# Patient Record
Sex: FEMALE | Race: WHITE | NOT HISPANIC OR LATINO
[De-identification: names, ages, dates, MRNs, and addresses within clinical notes are randomized per-mention and may not be internally consistent; named-entity substitution may affect disease eponyms.]

---

## 2020-01-21 ENCOUNTER — APPOINTMENT (OUTPATIENT)
Dept: ORTHOPEDIC SURGERY | Facility: CLINIC | Age: 75
End: 2020-01-21
Payer: MEDICARE

## 2020-01-21 VITALS
BODY MASS INDEX: 37.57 KG/M2 | DIASTOLIC BLOOD PRESSURE: 65 MMHG | HEIGHT: 58 IN | HEART RATE: 85 BPM | SYSTOLIC BLOOD PRESSURE: 105 MMHG | WEIGHT: 179 LBS

## 2020-01-21 DIAGNOSIS — Z78.9 OTHER SPECIFIED HEALTH STATUS: ICD-10-CM

## 2020-01-21 DIAGNOSIS — E66.9 OBESITY, UNSPECIFIED: ICD-10-CM

## 2020-01-21 DIAGNOSIS — M25.562 PAIN IN LEFT KNEE: ICD-10-CM

## 2020-01-21 DIAGNOSIS — Z87.39 PERSONAL HISTORY OF OTHER DISEASES OF THE MUSCULOSKELETAL SYSTEM AND CONNECTIVE TISSUE: ICD-10-CM

## 2020-01-21 DIAGNOSIS — M25.561 PAIN IN RIGHT KNEE: ICD-10-CM

## 2020-01-21 DIAGNOSIS — I89.0 LYMPHEDEMA, NOT ELSEWHERE CLASSIFIED: ICD-10-CM

## 2020-01-21 DIAGNOSIS — Z86.79 PERSONAL HISTORY OF OTHER DISEASES OF THE CIRCULATORY SYSTEM: ICD-10-CM

## 2020-01-21 DIAGNOSIS — I87.2 VENOUS INSUFFICIENCY (CHRONIC) (PERIPHERAL): ICD-10-CM

## 2020-01-21 PROCEDURE — 99203 OFFICE O/P NEW LOW 30 MIN: CPT

## 2020-01-21 PROCEDURE — 72170 X-RAY EXAM OF PELVIS: CPT

## 2020-01-21 PROCEDURE — 73562 X-RAY EXAM OF KNEE 3: CPT | Mod: 50

## 2020-01-21 RX ORDER — FUROSEMIDE 80 MG/1
TABLET ORAL
Refills: 0 | Status: ACTIVE | COMMUNITY

## 2020-01-21 RX ORDER — AMLODIPINE BESYLATE 5 MG/1
TABLET ORAL
Refills: 0 | Status: ACTIVE | COMMUNITY

## 2020-01-21 RX ORDER — NAPROXEN SODIUM 220 MG/1
TABLET ORAL
Refills: 0 | Status: ACTIVE | COMMUNITY

## 2020-01-21 RX ORDER — MULTIVITAMIN
TABLET ORAL
Refills: 0 | Status: ACTIVE | COMMUNITY

## 2020-01-21 RX ORDER — VITAMIN B COMPLEX
CAPSULE ORAL
Refills: 0 | Status: ACTIVE | COMMUNITY

## 2020-01-21 RX ORDER — DICLOFENAC SODIUM 75 MG/1
75 TABLET, DELAYED RELEASE ORAL
Refills: 0 | Status: ACTIVE | COMMUNITY

## 2020-01-21 RX ORDER — DICLOFENAC SODIUM 1 %
1 KIT TOPICAL
Refills: 0 | Status: ACTIVE | COMMUNITY

## 2020-01-21 RX ORDER — ASPIRIN 81 MG
81 TABLET, DELAYED RELEASE (ENTERIC COATED) ORAL
Refills: 0 | Status: ACTIVE | COMMUNITY

## 2020-01-21 RX ORDER — ASCORBIC ACID 500 MG
TABLET ORAL
Refills: 0 | Status: ACTIVE | COMMUNITY

## 2020-01-23 RX ORDER — HYALURONATE SODIUM 20 MG/2 ML
20 SYRINGE (ML) INTRAARTICULAR
Qty: 2 | Refills: 0 | Status: ACTIVE | OUTPATIENT
Start: 2020-01-23

## 2020-02-03 ENCOUNTER — APPOINTMENT (OUTPATIENT)
Dept: ORTHOPEDIC SURGERY | Facility: CLINIC | Age: 75
End: 2020-02-03
Payer: MEDICARE

## 2020-02-03 VITALS — DIASTOLIC BLOOD PRESSURE: 86 MMHG | SYSTOLIC BLOOD PRESSURE: 130 MMHG | HEART RATE: 74 BPM

## 2020-02-03 PROCEDURE — 20610 DRAIN/INJ JOINT/BURSA W/O US: CPT | Mod: 50

## 2020-02-03 RX ORDER — LIDOCAINE HYDROCHLORIDE 10 MG/ML
1 INJECTION, SOLUTION INFILTRATION; PERINEURAL
Refills: 0 | Status: COMPLETED | OUTPATIENT
Start: 2020-02-03

## 2020-02-03 RX ORDER — HYALURONATE SODIUM 20 MG/2 ML
20 SYRINGE (ML) INTRAARTICULAR
Refills: 0 | Status: COMPLETED | OUTPATIENT
Start: 2020-02-03

## 2020-02-03 RX ADMIN — Medication 2 MG/2ML: at 00:00

## 2020-02-03 RX ADMIN — LIDOCAINE HYDROCHLORIDE %: 10 INJECTION, SOLUTION INFILTRATION; PERINEURAL at 00:00

## 2020-02-10 ENCOUNTER — APPOINTMENT (OUTPATIENT)
Dept: ORTHOPEDIC SURGERY | Facility: CLINIC | Age: 75
End: 2020-02-10
Payer: MEDICARE

## 2020-02-10 VITALS — SYSTOLIC BLOOD PRESSURE: 143 MMHG | HEART RATE: 73 BPM | DIASTOLIC BLOOD PRESSURE: 82 MMHG

## 2020-02-10 PROCEDURE — 20610 DRAIN/INJ JOINT/BURSA W/O US: CPT | Mod: 50

## 2020-02-18 ENCOUNTER — APPOINTMENT (OUTPATIENT)
Dept: ORTHOPEDIC SURGERY | Facility: CLINIC | Age: 75
End: 2020-02-18
Payer: MEDICARE

## 2020-02-18 VITALS — HEIGHT: 58 IN | SYSTOLIC BLOOD PRESSURE: 152 MMHG | DIASTOLIC BLOOD PRESSURE: 89 MMHG | HEART RATE: 80 BPM

## 2020-02-18 DIAGNOSIS — M17.11 UNILATERAL PRIMARY OSTEOARTHRITIS, RIGHT KNEE: ICD-10-CM

## 2020-02-18 DIAGNOSIS — M17.12 UNILATERAL PRIMARY OSTEOARTHRITIS, LEFT KNEE: ICD-10-CM

## 2020-02-18 PROCEDURE — 20610 DRAIN/INJ JOINT/BURSA W/O US: CPT | Mod: RT

## 2020-02-18 RX ORDER — HYALURONATE SODIUM 20 MG/2 ML
20 SYRINGE (ML) INTRAARTICULAR
Refills: 0 | Status: COMPLETED | OUTPATIENT
Start: 2020-02-18

## 2020-02-18 RX ORDER — LIDOCAINE HYDROCHLORIDE 10 MG/ML
1 INJECTION, SOLUTION INFILTRATION; PERINEURAL
Refills: 0 | Status: COMPLETED | OUTPATIENT
Start: 2020-02-18

## 2020-02-18 RX ADMIN — Medication 2 MG/2ML: at 00:00

## 2020-02-18 RX ADMIN — LIDOCAINE HYDROCHLORIDE %: 10 INJECTION, SOLUTION INFILTRATION; PERINEURAL at 00:00

## 2020-02-25 ENCOUNTER — APPOINTMENT (OUTPATIENT)
Dept: VASCULAR SURGERY | Facility: CLINIC | Age: 75
End: 2020-02-25
Payer: MEDICARE

## 2020-02-25 VITALS
HEIGHT: 58 IN | SYSTOLIC BLOOD PRESSURE: 132 MMHG | TEMPERATURE: 98 F | WEIGHT: 203 LBS | DIASTOLIC BLOOD PRESSURE: 86 MMHG | HEART RATE: 80 BPM | BODY MASS INDEX: 42.61 KG/M2

## 2020-02-25 PROCEDURE — 99202 OFFICE O/P NEW SF 15 MIN: CPT

## 2020-02-25 NOTE — ASSESSMENT
[FreeTextEntry1] : Patient  has  LYmphedema  and  CVI  She  had  US  which shoed  GSV  insufficiency  Patient  has  excellent pulses  There is  no   contraindication  for  Knee procedure  Patient  needs  DVT  prophylaxis  .  Patient  can return  after the  Knee procedure  to  discuss Laser ablation  for  the  GSV  reflux   [Arterial/Venous Disease] : arterial/venous disease

## 2020-02-25 NOTE — HISTORY OF PRESENT ILLNESS
Other (Free Text): Treated pt with 4 cycles of cool core o flanks and stomach. Pt tolerated treatment well. Rec 2 round of CS for best results. LM
[FreeTextEntry1] : patient has b.l lower extremity lymphedema and venous insufficiency. She has varicose veins in her left leg that cause her pain at time. but she is not interested to get it fixed at this point
Detail Level: Zone

## 2020-02-25 NOTE — PHYSICAL EXAM
[Normal Heart Sounds] : normal heart sounds [Normal Breath Sounds] : Normal breath sounds [2+] : right 2+ [Ankle Swelling Bilaterally] : bilaterally  [Ankle Swelling On The Left] : moderate [Ankle Swelling (On Exam)] : present [Varicose Veins Of The Left Leg] : of the left leg [Varicose Veins Of Lower Extremities] : present [Ankle Swelling On The Right] : mild [Alert] : alert [Oriented to Person] : oriented to person [Oriented to Place] : oriented to place [Oriented to Time] : oriented to time [Calm] : calm [JVD] : no jugular venous distention  [] : not present [de-identified] : no acute distress

## 2021-01-01 ENCOUNTER — APPOINTMENT (OUTPATIENT)
Dept: ORTHOPEDIC SURGERY | Facility: CLINIC | Age: 76
End: 2021-01-01

## 2021-01-01 ENCOUNTER — INPATIENT (INPATIENT)
Facility: HOSPITAL | Age: 76
LOS: 20 days | End: 2022-01-13
Attending: INTERNAL MEDICINE | Admitting: INTERNAL MEDICINE
Payer: MEDICARE

## 2021-01-01 VITALS
TEMPERATURE: 99 F | OXYGEN SATURATION: 75 % | SYSTOLIC BLOOD PRESSURE: 118 MMHG | RESPIRATION RATE: 16 BRPM | HEART RATE: 95 BPM | DIASTOLIC BLOOD PRESSURE: 80 MMHG

## 2021-01-01 DIAGNOSIS — Z29.9 ENCOUNTER FOR PROPHYLACTIC MEASURES, UNSPECIFIED: ICD-10-CM

## 2021-01-01 DIAGNOSIS — U07.1 COVID-19: ICD-10-CM

## 2021-01-01 DIAGNOSIS — Z98.890 OTHER SPECIFIED POSTPROCEDURAL STATES: Chronic | ICD-10-CM

## 2021-01-01 DIAGNOSIS — J96.01 ACUTE RESPIRATORY FAILURE WITH HYPOXIA: ICD-10-CM

## 2021-01-01 DIAGNOSIS — R60.0 LOCALIZED EDEMA: ICD-10-CM

## 2021-01-01 DIAGNOSIS — I10 ESSENTIAL (PRIMARY) HYPERTENSION: ICD-10-CM

## 2021-01-01 LAB
A1C WITH ESTIMATED AVERAGE GLUCOSE RESULT: 5.7 % — HIGH (ref 4–5.6)
ALBUMIN SERPL ELPH-MCNC: 3 G/DL — LOW (ref 3.3–5)
ALBUMIN SERPL ELPH-MCNC: 3.1 G/DL — LOW (ref 3.3–5)
ALBUMIN SERPL ELPH-MCNC: 3.2 G/DL — LOW (ref 3.3–5)
ALBUMIN SERPL ELPH-MCNC: 3.3 G/DL — SIGNIFICANT CHANGE UP (ref 3.3–5)
ALBUMIN SERPL ELPH-MCNC: 3.4 G/DL — SIGNIFICANT CHANGE UP (ref 3.3–5)
ALBUMIN SERPL ELPH-MCNC: 3.4 G/DL — SIGNIFICANT CHANGE UP (ref 3.3–5)
ALBUMIN SERPL ELPH-MCNC: 3.8 G/DL — SIGNIFICANT CHANGE UP (ref 3.3–5)
ALP SERPL-CCNC: 69 U/L — SIGNIFICANT CHANGE UP (ref 40–120)
ALP SERPL-CCNC: 73 U/L — SIGNIFICANT CHANGE UP (ref 40–120)
ALP SERPL-CCNC: 73 U/L — SIGNIFICANT CHANGE UP (ref 40–120)
ALP SERPL-CCNC: 76 U/L — SIGNIFICANT CHANGE UP (ref 40–120)
ALP SERPL-CCNC: 81 U/L — SIGNIFICANT CHANGE UP (ref 40–120)
ALP SERPL-CCNC: 84 U/L — SIGNIFICANT CHANGE UP (ref 40–120)
ALP SERPL-CCNC: 88 U/L — SIGNIFICANT CHANGE UP (ref 40–120)
ALT FLD-CCNC: 16 U/L — SIGNIFICANT CHANGE UP (ref 4–33)
ALT FLD-CCNC: 18 U/L — SIGNIFICANT CHANGE UP (ref 4–33)
ALT FLD-CCNC: 18 U/L — SIGNIFICANT CHANGE UP (ref 4–33)
ALT FLD-CCNC: 20 U/L — SIGNIFICANT CHANGE UP (ref 4–33)
ALT FLD-CCNC: 20 U/L — SIGNIFICANT CHANGE UP (ref 4–33)
ALT FLD-CCNC: 30 U/L — SIGNIFICANT CHANGE UP (ref 4–33)
ALT FLD-CCNC: 38 U/L — HIGH (ref 4–33)
ANION GAP SERPL CALC-SCNC: 11 MMOL/L — SIGNIFICANT CHANGE UP (ref 7–14)
ANION GAP SERPL CALC-SCNC: 11 MMOL/L — SIGNIFICANT CHANGE UP (ref 7–14)
ANION GAP SERPL CALC-SCNC: 12 MMOL/L — SIGNIFICANT CHANGE UP (ref 7–14)
ANION GAP SERPL CALC-SCNC: 13 MMOL/L — SIGNIFICANT CHANGE UP (ref 7–14)
ANION GAP SERPL CALC-SCNC: 14 MMOL/L — SIGNIFICANT CHANGE UP (ref 7–14)
ANION GAP SERPL CALC-SCNC: 15 MMOL/L — HIGH (ref 7–14)
ANION GAP SERPL CALC-SCNC: 15 MMOL/L — HIGH (ref 7–14)
ANION GAP SERPL CALC-SCNC: 16 MMOL/L — HIGH (ref 7–14)
ANION GAP SERPL CALC-SCNC: 17 MMOL/L — HIGH (ref 7–14)
AST SERPL-CCNC: 21 U/L — SIGNIFICANT CHANGE UP (ref 4–32)
AST SERPL-CCNC: 23 U/L — SIGNIFICANT CHANGE UP (ref 4–32)
AST SERPL-CCNC: 29 U/L — SIGNIFICANT CHANGE UP (ref 4–32)
AST SERPL-CCNC: 29 U/L — SIGNIFICANT CHANGE UP (ref 4–32)
AST SERPL-CCNC: 42 U/L — HIGH (ref 4–32)
AST SERPL-CCNC: 44 U/L — HIGH (ref 4–32)
AST SERPL-CCNC: 47 U/L — HIGH (ref 4–32)
BASOPHILS # BLD AUTO: 0.03 K/UL — SIGNIFICANT CHANGE UP (ref 0–0.2)
BASOPHILS # BLD AUTO: 0.03 K/UL — SIGNIFICANT CHANGE UP (ref 0–0.2)
BASOPHILS NFR BLD AUTO: 0.2 % — SIGNIFICANT CHANGE UP (ref 0–2)
BASOPHILS NFR BLD AUTO: 0.4 % — SIGNIFICANT CHANGE UP (ref 0–2)
BILIRUB SERPL-MCNC: 0.2 MG/DL — SIGNIFICANT CHANGE UP (ref 0.2–1.2)
BILIRUB SERPL-MCNC: 0.3 MG/DL — SIGNIFICANT CHANGE UP (ref 0.2–1.2)
BILIRUB SERPL-MCNC: 0.4 MG/DL — SIGNIFICANT CHANGE UP (ref 0.2–1.2)
BILIRUB SERPL-MCNC: 0.5 MG/DL — SIGNIFICANT CHANGE UP (ref 0.2–1.2)
BILIRUB SERPL-MCNC: 0.6 MG/DL — SIGNIFICANT CHANGE UP (ref 0.2–1.2)
BUN SERPL-MCNC: 14 MG/DL — SIGNIFICANT CHANGE UP (ref 7–23)
BUN SERPL-MCNC: 20 MG/DL — SIGNIFICANT CHANGE UP (ref 7–23)
BUN SERPL-MCNC: 20 MG/DL — SIGNIFICANT CHANGE UP (ref 7–23)
BUN SERPL-MCNC: 22 MG/DL — SIGNIFICANT CHANGE UP (ref 7–23)
BUN SERPL-MCNC: 22 MG/DL — SIGNIFICANT CHANGE UP (ref 7–23)
BUN SERPL-MCNC: 23 MG/DL — SIGNIFICANT CHANGE UP (ref 7–23)
BUN SERPL-MCNC: 23 MG/DL — SIGNIFICANT CHANGE UP (ref 7–23)
BUN SERPL-MCNC: 26 MG/DL — HIGH (ref 7–23)
BUN SERPL-MCNC: 27 MG/DL — HIGH (ref 7–23)
BUN SERPL-MCNC: SIGNIFICANT CHANGE UP MG/DL (ref 7–23)
CALCIUM SERPL-MCNC: 8.8 MG/DL — SIGNIFICANT CHANGE UP (ref 8.4–10.5)
CALCIUM SERPL-MCNC: 8.9 MG/DL — SIGNIFICANT CHANGE UP (ref 8.4–10.5)
CALCIUM SERPL-MCNC: 9 MG/DL — SIGNIFICANT CHANGE UP (ref 8.4–10.5)
CALCIUM SERPL-MCNC: 9.2 MG/DL — SIGNIFICANT CHANGE UP (ref 8.4–10.5)
CALCIUM SERPL-MCNC: 9.3 MG/DL — SIGNIFICANT CHANGE UP (ref 8.4–10.5)
CALCIUM SERPL-MCNC: SIGNIFICANT CHANGE UP MG/DL (ref 8.4–10.5)
CHLORIDE SERPL-SCNC: 101 MMOL/L — SIGNIFICANT CHANGE UP (ref 98–107)
CHLORIDE SERPL-SCNC: 101 MMOL/L — SIGNIFICANT CHANGE UP (ref 98–107)
CHLORIDE SERPL-SCNC: 102 MMOL/L — SIGNIFICANT CHANGE UP (ref 98–107)
CHLORIDE SERPL-SCNC: 104 MMOL/L — SIGNIFICANT CHANGE UP (ref 98–107)
CHLORIDE SERPL-SCNC: 105 MMOL/L — SIGNIFICANT CHANGE UP (ref 98–107)
CHLORIDE SERPL-SCNC: 106 MMOL/L — SIGNIFICANT CHANGE UP (ref 98–107)
CHLORIDE SERPL-SCNC: 106 MMOL/L — SIGNIFICANT CHANGE UP (ref 98–107)
CHLORIDE SERPL-SCNC: 107 MMOL/L — SIGNIFICANT CHANGE UP (ref 98–107)
CHLORIDE SERPL-SCNC: 108 MMOL/L — HIGH (ref 98–107)
CHLORIDE SERPL-SCNC: SIGNIFICANT CHANGE UP MMOL/L (ref 98–107)
CHOLEST SERPL-MCNC: 178 MG/DL — SIGNIFICANT CHANGE UP
CK MB BLD-MCNC: 1.1 % — SIGNIFICANT CHANGE UP (ref 0–2.5)
CK MB CFR SERPL CALC: 1.2 NG/ML — SIGNIFICANT CHANGE UP
CK SERPL-CCNC: 112 U/L — SIGNIFICANT CHANGE UP (ref 25–170)
CO2 SERPL-SCNC: 23 MMOL/L — SIGNIFICANT CHANGE UP (ref 22–31)
CO2 SERPL-SCNC: 24 MMOL/L — SIGNIFICANT CHANGE UP (ref 22–31)
CO2 SERPL-SCNC: 27 MMOL/L — SIGNIFICANT CHANGE UP (ref 22–31)
CO2 SERPL-SCNC: 29 MMOL/L — SIGNIFICANT CHANGE UP (ref 22–31)
CO2 SERPL-SCNC: 30 MMOL/L — SIGNIFICANT CHANGE UP (ref 22–31)
CO2 SERPL-SCNC: 31 MMOL/L — SIGNIFICANT CHANGE UP (ref 22–31)
CO2 SERPL-SCNC: 31 MMOL/L — SIGNIFICANT CHANGE UP (ref 22–31)
CO2 SERPL-SCNC: SIGNIFICANT CHANGE UP MMOL/L (ref 22–31)
CREAT SERPL-MCNC: 0.38 MG/DL — LOW (ref 0.5–1.3)
CREAT SERPL-MCNC: 0.41 MG/DL — LOW (ref 0.5–1.3)
CREAT SERPL-MCNC: 0.42 MG/DL — LOW (ref 0.5–1.3)
CREAT SERPL-MCNC: 0.42 MG/DL — LOW (ref 0.5–1.3)
CREAT SERPL-MCNC: 0.45 MG/DL — LOW (ref 0.5–1.3)
CREAT SERPL-MCNC: 0.45 MG/DL — LOW (ref 0.5–1.3)
CREAT SERPL-MCNC: 0.46 MG/DL — LOW (ref 0.5–1.3)
CREAT SERPL-MCNC: 0.47 MG/DL — LOW (ref 0.5–1.3)
CREAT SERPL-MCNC: 0.72 MG/DL — SIGNIFICANT CHANGE UP (ref 0.5–1.3)
CREAT SERPL-MCNC: SIGNIFICANT CHANGE UP MG/DL (ref 0.5–1.3)
CRP SERPL-MCNC: 139.8 MG/L — HIGH
CRP SERPL-MCNC: 70.7 MG/L — HIGH
CRP SERPL-MCNC: 83.3 MG/L — HIGH
D DIMER BLD IA.RAPID-MCNC: 2186 NG/ML DDU — HIGH
D DIMER BLD IA.RAPID-MCNC: 2580 NG/ML DDU — HIGH
D DIMER BLD IA.RAPID-MCNC: 532 NG/ML DDU — HIGH
EOSINOPHIL # BLD AUTO: 0 K/UL — SIGNIFICANT CHANGE UP (ref 0–0.5)
EOSINOPHIL # BLD AUTO: 0.21 K/UL — SIGNIFICANT CHANGE UP (ref 0–0.5)
EOSINOPHIL NFR BLD AUTO: 0 % — SIGNIFICANT CHANGE UP (ref 0–6)
EOSINOPHIL NFR BLD AUTO: 1.7 % — SIGNIFICANT CHANGE UP (ref 0–6)
ESTIMATED AVERAGE GLUCOSE: 117 — SIGNIFICANT CHANGE UP
FERRITIN SERPL-MCNC: 416 NG/ML — HIGH (ref 15–150)
FERRITIN SERPL-MCNC: 503 NG/ML — HIGH (ref 15–150)
FERRITIN SERPL-MCNC: 607 NG/ML — HIGH (ref 15–150)
GLUCOSE BLDC GLUCOMTR-MCNC: 101 MG/DL — HIGH (ref 70–99)
GLUCOSE BLDC GLUCOMTR-MCNC: 101 MG/DL — HIGH (ref 70–99)
GLUCOSE BLDC GLUCOMTR-MCNC: 105 MG/DL — HIGH (ref 70–99)
GLUCOSE BLDC GLUCOMTR-MCNC: 109 MG/DL — HIGH (ref 70–99)
GLUCOSE BLDC GLUCOMTR-MCNC: 111 MG/DL — HIGH (ref 70–99)
GLUCOSE BLDC GLUCOMTR-MCNC: 119 MG/DL — HIGH (ref 70–99)
GLUCOSE BLDC GLUCOMTR-MCNC: 119 MG/DL — HIGH (ref 70–99)
GLUCOSE BLDC GLUCOMTR-MCNC: 120 MG/DL — HIGH (ref 70–99)
GLUCOSE BLDC GLUCOMTR-MCNC: 124 MG/DL — HIGH (ref 70–99)
GLUCOSE BLDC GLUCOMTR-MCNC: 124 MG/DL — HIGH (ref 70–99)
GLUCOSE BLDC GLUCOMTR-MCNC: 126 MG/DL — HIGH (ref 70–99)
GLUCOSE BLDC GLUCOMTR-MCNC: 128 MG/DL — HIGH (ref 70–99)
GLUCOSE BLDC GLUCOMTR-MCNC: 129 MG/DL — HIGH (ref 70–99)
GLUCOSE BLDC GLUCOMTR-MCNC: 131 MG/DL — HIGH (ref 70–99)
GLUCOSE BLDC GLUCOMTR-MCNC: 134 MG/DL — HIGH (ref 70–99)
GLUCOSE BLDC GLUCOMTR-MCNC: 134 MG/DL — HIGH (ref 70–99)
GLUCOSE BLDC GLUCOMTR-MCNC: 137 MG/DL — HIGH (ref 70–99)
GLUCOSE BLDC GLUCOMTR-MCNC: 138 MG/DL — HIGH (ref 70–99)
GLUCOSE BLDC GLUCOMTR-MCNC: 140 MG/DL — HIGH (ref 70–99)
GLUCOSE BLDC GLUCOMTR-MCNC: 145 MG/DL — HIGH (ref 70–99)
GLUCOSE BLDC GLUCOMTR-MCNC: 153 MG/DL — HIGH (ref 70–99)
GLUCOSE BLDC GLUCOMTR-MCNC: 177 MG/DL — HIGH (ref 70–99)
GLUCOSE BLDC GLUCOMTR-MCNC: 89 MG/DL — SIGNIFICANT CHANGE UP (ref 70–99)
GLUCOSE BLDC GLUCOMTR-MCNC: 94 MG/DL — SIGNIFICANT CHANGE UP (ref 70–99)
GLUCOSE BLDC GLUCOMTR-MCNC: 96 MG/DL — SIGNIFICANT CHANGE UP (ref 70–99)
GLUCOSE BLDC GLUCOMTR-MCNC: 96 MG/DL — SIGNIFICANT CHANGE UP (ref 70–99)
GLUCOSE SERPL-MCNC: 101 MG/DL — HIGH (ref 70–99)
GLUCOSE SERPL-MCNC: 102 MG/DL — HIGH (ref 70–99)
GLUCOSE SERPL-MCNC: 110 MG/DL — HIGH (ref 70–99)
GLUCOSE SERPL-MCNC: 123 MG/DL — HIGH (ref 70–99)
GLUCOSE SERPL-MCNC: 124 MG/DL — HIGH (ref 70–99)
GLUCOSE SERPL-MCNC: 138 MG/DL — HIGH (ref 70–99)
GLUCOSE SERPL-MCNC: 144 MG/DL — HIGH (ref 70–99)
GLUCOSE SERPL-MCNC: 146 MG/DL — HIGH (ref 70–99)
GLUCOSE SERPL-MCNC: 76 MG/DL — SIGNIFICANT CHANGE UP (ref 70–99)
GLUCOSE SERPL-MCNC: SIGNIFICANT CHANGE UP MG/DL (ref 70–99)
HCT VFR BLD CALC: 36.9 % — SIGNIFICANT CHANGE UP (ref 34.5–45)
HCT VFR BLD CALC: 38 % — SIGNIFICANT CHANGE UP (ref 34.5–45)
HCT VFR BLD CALC: 38 % — SIGNIFICANT CHANGE UP (ref 34.5–45)
HCT VFR BLD CALC: 38.2 % — SIGNIFICANT CHANGE UP (ref 34.5–45)
HCT VFR BLD CALC: 40 % — SIGNIFICANT CHANGE UP (ref 34.5–45)
HCT VFR BLD CALC: 40.7 % — SIGNIFICANT CHANGE UP (ref 34.5–45)
HCT VFR BLD CALC: 41.6 % — SIGNIFICANT CHANGE UP (ref 34.5–45)
HCT VFR BLD CALC: 43.4 % — SIGNIFICANT CHANGE UP (ref 34.5–45)
HCV AB S/CO SERPL IA: 0.08 S/CO — SIGNIFICANT CHANGE UP (ref 0–0.99)
HCV AB SERPL-IMP: SIGNIFICANT CHANGE UP
HDLC SERPL-MCNC: 39 MG/DL — LOW
HGB BLD-MCNC: 12.2 G/DL — SIGNIFICANT CHANGE UP (ref 11.5–15.5)
HGB BLD-MCNC: 12.3 G/DL — SIGNIFICANT CHANGE UP (ref 11.5–15.5)
HGB BLD-MCNC: 12.3 G/DL — SIGNIFICANT CHANGE UP (ref 11.5–15.5)
HGB BLD-MCNC: 12.7 G/DL — SIGNIFICANT CHANGE UP (ref 11.5–15.5)
HGB BLD-MCNC: 12.8 G/DL — SIGNIFICANT CHANGE UP (ref 11.5–15.5)
HGB BLD-MCNC: 13.4 G/DL — SIGNIFICANT CHANGE UP (ref 11.5–15.5)
HGB BLD-MCNC: 13.5 G/DL — SIGNIFICANT CHANGE UP (ref 11.5–15.5)
HGB BLD-MCNC: 14.1 G/DL — SIGNIFICANT CHANGE UP (ref 11.5–15.5)
IANC: 5.58 K/UL — SIGNIFICANT CHANGE UP (ref 1.5–8.5)
IANC: 9.63 K/UL — HIGH (ref 1.5–8.5)
IMM GRANULOCYTES NFR BLD AUTO: 1.7 % — HIGH (ref 0–1.5)
IMM GRANULOCYTES NFR BLD AUTO: 3.2 % — HIGH (ref 0–1.5)
LDH SERPL L TO P-CCNC: 423 U/L — HIGH (ref 135–225)
LIPID PNL WITH DIRECT LDL SERPL: 115 MG/DL — HIGH
LYMPHOCYTES # BLD AUTO: 1.32 K/UL — SIGNIFICANT CHANGE UP (ref 1–3.3)
LYMPHOCYTES # BLD AUTO: 1.59 K/UL — SIGNIFICANT CHANGE UP (ref 1–3.3)
LYMPHOCYTES # BLD AUTO: 12.6 % — LOW (ref 13–44)
LYMPHOCYTES # BLD AUTO: 16.8 % — SIGNIFICANT CHANGE UP (ref 13–44)
MAGNESIUM SERPL-MCNC: 1.9 MG/DL — SIGNIFICANT CHANGE UP (ref 1.6–2.6)
MAGNESIUM SERPL-MCNC: 2 MG/DL — SIGNIFICANT CHANGE UP (ref 1.6–2.6)
MAGNESIUM SERPL-MCNC: 2.1 MG/DL — SIGNIFICANT CHANGE UP (ref 1.6–2.6)
MAGNESIUM SERPL-MCNC: 2.1 MG/DL — SIGNIFICANT CHANGE UP (ref 1.6–2.6)
MAGNESIUM SERPL-MCNC: 2.2 MG/DL — SIGNIFICANT CHANGE UP (ref 1.6–2.6)
MAGNESIUM SERPL-MCNC: 2.3 MG/DL — SIGNIFICANT CHANGE UP (ref 1.6–2.6)
MAGNESIUM SERPL-MCNC: SIGNIFICANT CHANGE UP MG/DL (ref 1.6–2.6)
MCHC RBC-ENTMCNC: 30.3 PG — SIGNIFICANT CHANGE UP (ref 27–34)
MCHC RBC-ENTMCNC: 30.4 PG — SIGNIFICANT CHANGE UP (ref 27–34)
MCHC RBC-ENTMCNC: 30.6 PG — SIGNIFICANT CHANGE UP (ref 27–34)
MCHC RBC-ENTMCNC: 30.8 PG — SIGNIFICANT CHANGE UP (ref 27–34)
MCHC RBC-ENTMCNC: 30.9 PG — SIGNIFICANT CHANGE UP (ref 27–34)
MCHC RBC-ENTMCNC: 31 PG — SIGNIFICANT CHANGE UP (ref 27–34)
MCHC RBC-ENTMCNC: 31 PG — SIGNIFICANT CHANGE UP (ref 27–34)
MCHC RBC-ENTMCNC: 31.2 PG — SIGNIFICANT CHANGE UP (ref 27–34)
MCHC RBC-ENTMCNC: 31.8 GM/DL — LOW (ref 32–36)
MCHC RBC-ENTMCNC: 32.1 GM/DL — SIGNIFICANT CHANGE UP (ref 32–36)
MCHC RBC-ENTMCNC: 32.2 GM/DL — SIGNIFICANT CHANGE UP (ref 32–36)
MCHC RBC-ENTMCNC: 32.4 GM/DL — SIGNIFICANT CHANGE UP (ref 32–36)
MCHC RBC-ENTMCNC: 32.5 GM/DL — SIGNIFICANT CHANGE UP (ref 32–36)
MCHC RBC-ENTMCNC: 33.2 GM/DL — SIGNIFICANT CHANGE UP (ref 32–36)
MCHC RBC-ENTMCNC: 33.3 GM/DL — SIGNIFICANT CHANGE UP (ref 32–36)
MCHC RBC-ENTMCNC: 33.5 GM/DL — SIGNIFICANT CHANGE UP (ref 32–36)
MCV RBC AUTO: 92.9 FL — SIGNIFICANT CHANGE UP (ref 80–100)
MCV RBC AUTO: 93.1 FL — SIGNIFICANT CHANGE UP (ref 80–100)
MCV RBC AUTO: 93.2 FL — SIGNIFICANT CHANGE UP (ref 80–100)
MCV RBC AUTO: 93.3 FL — SIGNIFICANT CHANGE UP (ref 80–100)
MCV RBC AUTO: 95 FL — SIGNIFICANT CHANGE UP (ref 80–100)
MCV RBC AUTO: 95.2 FL — SIGNIFICANT CHANGE UP (ref 80–100)
MCV RBC AUTO: 95.7 FL — SIGNIFICANT CHANGE UP (ref 80–100)
MCV RBC AUTO: 96.4 FL — SIGNIFICANT CHANGE UP (ref 80–100)
MONOCYTES # BLD AUTO: 0.68 K/UL — SIGNIFICANT CHANGE UP (ref 0–0.9)
MONOCYTES # BLD AUTO: 0.92 K/UL — HIGH (ref 0–0.9)
MONOCYTES NFR BLD AUTO: 7.3 % — SIGNIFICANT CHANGE UP (ref 2–14)
MONOCYTES NFR BLD AUTO: 8.7 % — SIGNIFICANT CHANGE UP (ref 2–14)
NEUTROPHILS # BLD AUTO: 5.58 K/UL — SIGNIFICANT CHANGE UP (ref 1.8–7.4)
NEUTROPHILS # BLD AUTO: 9.63 K/UL — HIGH (ref 1.8–7.4)
NEUTROPHILS NFR BLD AUTO: 70.9 % — SIGNIFICANT CHANGE UP (ref 43–77)
NEUTROPHILS NFR BLD AUTO: 76.5 % — SIGNIFICANT CHANGE UP (ref 43–77)
NON HDL CHOLESTEROL: 139 MG/DL — HIGH
NRBC # BLD: 0 /100 WBCS — SIGNIFICANT CHANGE UP
NRBC # FLD: 0 K/UL — SIGNIFICANT CHANGE UP
NT-PROBNP SERPL-SCNC: 536 PG/ML — HIGH
PHOSPHATE SERPL-MCNC: 2.1 MG/DL — LOW (ref 2.5–4.5)
PHOSPHATE SERPL-MCNC: 2.3 MG/DL — LOW (ref 2.5–4.5)
PHOSPHATE SERPL-MCNC: 2.4 MG/DL — LOW (ref 2.5–4.5)
PHOSPHATE SERPL-MCNC: 2.4 MG/DL — LOW (ref 2.5–4.5)
PHOSPHATE SERPL-MCNC: 2.5 MG/DL — SIGNIFICANT CHANGE UP (ref 2.5–4.5)
PHOSPHATE SERPL-MCNC: 2.6 MG/DL — SIGNIFICANT CHANGE UP (ref 2.5–4.5)
PLATELET # BLD AUTO: 294 K/UL — SIGNIFICANT CHANGE UP (ref 150–400)
PLATELET # BLD AUTO: 300 K/UL — SIGNIFICANT CHANGE UP (ref 150–400)
PLATELET # BLD AUTO: 376 K/UL — SIGNIFICANT CHANGE UP (ref 150–400)
PLATELET # BLD AUTO: 434 K/UL — HIGH (ref 150–400)
PLATELET # BLD AUTO: 448 K/UL — HIGH (ref 150–400)
PLATELET # BLD AUTO: 479 K/UL — HIGH (ref 150–400)
PLATELET # BLD AUTO: 609 K/UL — HIGH (ref 150–400)
PLATELET # BLD AUTO: 610 K/UL — HIGH (ref 150–400)
POTASSIUM SERPL-MCNC: 3.2 MMOL/L — LOW (ref 3.5–5.3)
POTASSIUM SERPL-MCNC: 3.4 MMOL/L — LOW (ref 3.5–5.3)
POTASSIUM SERPL-MCNC: 3.4 MMOL/L — LOW (ref 3.5–5.3)
POTASSIUM SERPL-MCNC: 3.5 MMOL/L — SIGNIFICANT CHANGE UP (ref 3.5–5.3)
POTASSIUM SERPL-MCNC: 3.6 MMOL/L — SIGNIFICANT CHANGE UP (ref 3.5–5.3)
POTASSIUM SERPL-MCNC: 3.7 MMOL/L — SIGNIFICANT CHANGE UP (ref 3.5–5.3)
POTASSIUM SERPL-MCNC: 3.9 MMOL/L — SIGNIFICANT CHANGE UP (ref 3.5–5.3)
POTASSIUM SERPL-MCNC: 4 MMOL/L — SIGNIFICANT CHANGE UP (ref 3.5–5.3)
POTASSIUM SERPL-MCNC: 4.1 MMOL/L — SIGNIFICANT CHANGE UP (ref 3.5–5.3)
POTASSIUM SERPL-MCNC: SIGNIFICANT CHANGE UP MMOL/L (ref 3.5–5.3)
POTASSIUM SERPL-SCNC: 3.2 MMOL/L — LOW (ref 3.5–5.3)
POTASSIUM SERPL-SCNC: 3.4 MMOL/L — LOW (ref 3.5–5.3)
POTASSIUM SERPL-SCNC: 3.4 MMOL/L — LOW (ref 3.5–5.3)
POTASSIUM SERPL-SCNC: 3.5 MMOL/L — SIGNIFICANT CHANGE UP (ref 3.5–5.3)
POTASSIUM SERPL-SCNC: 3.6 MMOL/L — SIGNIFICANT CHANGE UP (ref 3.5–5.3)
POTASSIUM SERPL-SCNC: 3.7 MMOL/L — SIGNIFICANT CHANGE UP (ref 3.5–5.3)
POTASSIUM SERPL-SCNC: 3.9 MMOL/L — SIGNIFICANT CHANGE UP (ref 3.5–5.3)
POTASSIUM SERPL-SCNC: 4 MMOL/L — SIGNIFICANT CHANGE UP (ref 3.5–5.3)
POTASSIUM SERPL-SCNC: 4.1 MMOL/L — SIGNIFICANT CHANGE UP (ref 3.5–5.3)
POTASSIUM SERPL-SCNC: SIGNIFICANT CHANGE UP MMOL/L (ref 3.5–5.3)
PROCALCITONIN SERPL-MCNC: 0.05 NG/ML — SIGNIFICANT CHANGE UP (ref 0.02–0.1)
PROCALCITONIN SERPL-MCNC: 0.05 NG/ML — SIGNIFICANT CHANGE UP (ref 0.02–0.1)
PROCALCITONIN SERPL-MCNC: 0.08 NG/ML — SIGNIFICANT CHANGE UP (ref 0.02–0.1)
PROT SERPL-MCNC: 5.9 G/DL — LOW (ref 6–8.3)
PROT SERPL-MCNC: 6 G/DL — SIGNIFICANT CHANGE UP (ref 6–8.3)
PROT SERPL-MCNC: 6.2 G/DL — SIGNIFICANT CHANGE UP (ref 6–8.3)
PROT SERPL-MCNC: 6.2 G/DL — SIGNIFICANT CHANGE UP (ref 6–8.3)
PROT SERPL-MCNC: 6.3 G/DL — SIGNIFICANT CHANGE UP (ref 6–8.3)
PROT SERPL-MCNC: 6.4 G/DL — SIGNIFICANT CHANGE UP (ref 6–8.3)
PROT SERPL-MCNC: 7 G/DL — SIGNIFICANT CHANGE UP (ref 6–8.3)
RBC # BLD: 3.94 M/UL — SIGNIFICANT CHANGE UP (ref 3.8–5.2)
RBC # BLD: 3.97 M/UL — SIGNIFICANT CHANGE UP (ref 3.8–5.2)
RBC # BLD: 3.99 M/UL — SIGNIFICANT CHANGE UP (ref 3.8–5.2)
RBC # BLD: 4.1 M/UL — SIGNIFICANT CHANGE UP (ref 3.8–5.2)
RBC # BLD: 4.18 M/UL — SIGNIFICANT CHANGE UP (ref 3.8–5.2)
RBC # BLD: 4.37 M/UL — SIGNIFICANT CHANGE UP (ref 3.8–5.2)
RBC # BLD: 4.38 M/UL — SIGNIFICANT CHANGE UP (ref 3.8–5.2)
RBC # BLD: 4.65 M/UL — SIGNIFICANT CHANGE UP (ref 3.8–5.2)
RBC # FLD: 13.4 % — SIGNIFICANT CHANGE UP (ref 10.3–14.5)
RBC # FLD: 13.5 % — SIGNIFICANT CHANGE UP (ref 10.3–14.5)
RBC # FLD: 13.6 % — SIGNIFICANT CHANGE UP (ref 10.3–14.5)
RBC # FLD: 13.9 % — SIGNIFICANT CHANGE UP (ref 10.3–14.5)
RBC # FLD: 14 % — SIGNIFICANT CHANGE UP (ref 10.3–14.5)
RBC # FLD: 14.1 % — SIGNIFICANT CHANGE UP (ref 10.3–14.5)
SARS-COV-2 RNA SPEC QL NAA+PROBE: DETECTED
SODIUM SERPL-SCNC: 142 MMOL/L — SIGNIFICANT CHANGE UP (ref 135–145)
SODIUM SERPL-SCNC: 143 MMOL/L — SIGNIFICANT CHANGE UP (ref 135–145)
SODIUM SERPL-SCNC: 145 MMOL/L — SIGNIFICANT CHANGE UP (ref 135–145)
SODIUM SERPL-SCNC: 146 MMOL/L — HIGH (ref 135–145)
SODIUM SERPL-SCNC: 146 MMOL/L — HIGH (ref 135–145)
SODIUM SERPL-SCNC: 147 MMOL/L — HIGH (ref 135–145)
SODIUM SERPL-SCNC: 147 MMOL/L — HIGH (ref 135–145)
SODIUM SERPL-SCNC: 148 MMOL/L — HIGH (ref 135–145)
SODIUM SERPL-SCNC: 149 MMOL/L — HIGH (ref 135–145)
SODIUM SERPL-SCNC: SIGNIFICANT CHANGE UP MMOL/L (ref 135–145)
TRIGL SERPL-MCNC: 120 MG/DL — SIGNIFICANT CHANGE UP
TROPONIN T, HIGH SENSITIVITY RESULT: 19 NG/L — SIGNIFICANT CHANGE UP
TSH SERPL-MCNC: 2.84 UIU/ML — SIGNIFICANT CHANGE UP (ref 0.27–4.2)
WBC # BLD: 11.79 K/UL — HIGH (ref 3.8–10.5)
WBC # BLD: 12.6 K/UL — HIGH (ref 3.8–10.5)
WBC # BLD: 13.11 K/UL — HIGH (ref 3.8–10.5)
WBC # BLD: 13.81 K/UL — HIGH (ref 3.8–10.5)
WBC # BLD: 13.84 K/UL — HIGH (ref 3.8–10.5)
WBC # BLD: 14.14 K/UL — HIGH (ref 3.8–10.5)
WBC # BLD: 7.86 K/UL — SIGNIFICANT CHANGE UP (ref 3.8–10.5)
WBC # BLD: 8.6 K/UL — SIGNIFICANT CHANGE UP (ref 3.8–10.5)
WBC # FLD AUTO: 11.79 K/UL — HIGH (ref 3.8–10.5)
WBC # FLD AUTO: 12.6 K/UL — HIGH (ref 3.8–10.5)
WBC # FLD AUTO: 13.11 K/UL — HIGH (ref 3.8–10.5)
WBC # FLD AUTO: 13.81 K/UL — HIGH (ref 3.8–10.5)
WBC # FLD AUTO: 13.84 K/UL — HIGH (ref 3.8–10.5)
WBC # FLD AUTO: 14.14 K/UL — HIGH (ref 3.8–10.5)
WBC # FLD AUTO: 7.86 K/UL — SIGNIFICANT CHANGE UP (ref 3.8–10.5)
WBC # FLD AUTO: 8.6 K/UL — SIGNIFICANT CHANGE UP (ref 3.8–10.5)

## 2021-01-01 PROCEDURE — 93010 ELECTROCARDIOGRAM REPORT: CPT

## 2021-01-01 PROCEDURE — 71045 X-RAY EXAM CHEST 1 VIEW: CPT | Mod: 26

## 2021-01-01 PROCEDURE — 99291 CRITICAL CARE FIRST HOUR: CPT | Mod: 25

## 2021-01-01 PROCEDURE — 99223 1ST HOSP IP/OBS HIGH 75: CPT

## 2021-01-01 RX ORDER — FUROSEMIDE 40 MG
40 TABLET ORAL DAILY
Refills: 0 | Status: DISCONTINUED | OUTPATIENT
Start: 2021-01-01 | End: 2022-01-01

## 2021-01-01 RX ORDER — POTASSIUM CHLORIDE 20 MEQ
10 PACKET (EA) ORAL
Refills: 0 | Status: COMPLETED | OUTPATIENT
Start: 2021-01-01 | End: 2021-01-01

## 2021-01-01 RX ORDER — AMLODIPINE BESYLATE 2.5 MG/1
1 TABLET ORAL
Qty: 0 | Refills: 0 | DISCHARGE

## 2021-01-01 RX ORDER — AMLODIPINE BESYLATE 2.5 MG/1
5 TABLET ORAL DAILY
Refills: 0 | Status: DISCONTINUED | OUTPATIENT
Start: 2021-01-01 | End: 2022-01-01

## 2021-01-01 RX ORDER — CHLORHEXIDINE GLUCONATE 213 G/1000ML
1 SOLUTION TOPICAL DAILY
Refills: 0 | Status: DISCONTINUED | OUTPATIENT
Start: 2021-01-01 | End: 2022-01-01

## 2021-01-01 RX ORDER — ALBUTEROL 90 UG/1
2 AEROSOL, METERED ORAL EVERY 4 HOURS
Refills: 0 | Status: DISCONTINUED | OUTPATIENT
Start: 2021-01-01 | End: 2022-01-01

## 2021-01-01 RX ORDER — ASPIRIN/CALCIUM CARB/MAGNESIUM 324 MG
1 TABLET ORAL
Qty: 0 | Refills: 0 | DISCHARGE

## 2021-01-01 RX ORDER — SODIUM,POTASSIUM PHOSPHATES 278-250MG
1 POWDER IN PACKET (EA) ORAL ONCE
Refills: 0 | Status: COMPLETED | OUTPATIENT
Start: 2021-01-01 | End: 2021-01-01

## 2021-01-01 RX ORDER — ACETAMINOPHEN 500 MG
650 TABLET ORAL EVERY 4 HOURS
Refills: 0 | Status: DISCONTINUED | OUTPATIENT
Start: 2021-01-01 | End: 2022-01-01

## 2021-01-01 RX ORDER — ENOXAPARIN SODIUM 100 MG/ML
40 INJECTION SUBCUTANEOUS EVERY 12 HOURS
Refills: 0 | Status: DISCONTINUED | OUTPATIENT
Start: 2021-01-01 | End: 2021-01-01

## 2021-01-01 RX ORDER — DEXAMETHASONE 0.5 MG/5ML
6 ELIXIR ORAL DAILY
Refills: 0 | Status: COMPLETED | OUTPATIENT
Start: 2021-01-01 | End: 2022-01-01

## 2021-01-01 RX ORDER — SODIUM CHLORIDE 9 MG/ML
1000 INJECTION, SOLUTION INTRAVENOUS
Refills: 0 | Status: DISCONTINUED | OUTPATIENT
Start: 2021-01-01 | End: 2022-01-01

## 2021-01-01 RX ORDER — REMDESIVIR 5 MG/ML
100 INJECTION INTRAVENOUS EVERY 24 HOURS
Refills: 0 | Status: COMPLETED | OUTPATIENT
Start: 2021-01-01 | End: 2022-01-01

## 2021-01-01 RX ORDER — DICLOFENAC SODIUM 30 MG/G
1 GEL TOPICAL
Qty: 0 | Refills: 0 | DISCHARGE

## 2021-01-01 RX ORDER — GABAPENTIN 400 MG/1
200 CAPSULE ORAL
Refills: 0 | Status: DISCONTINUED | OUTPATIENT
Start: 2021-01-01 | End: 2022-01-01

## 2021-01-01 RX ORDER — ACETAMINOPHEN 500 MG
650 TABLET ORAL ONCE
Refills: 0 | Status: COMPLETED | OUTPATIENT
Start: 2021-01-01 | End: 2021-01-01

## 2021-01-01 RX ORDER — POTASSIUM CHLORIDE 20 MEQ
40 PACKET (EA) ORAL ONCE
Refills: 0 | Status: COMPLETED | OUTPATIENT
Start: 2021-01-01 | End: 2021-01-01

## 2021-01-01 RX ORDER — REMDESIVIR 5 MG/ML
100 INJECTION INTRAVENOUS EVERY 24 HOURS
Refills: 0 | Status: DISCONTINUED | OUTPATIENT
Start: 2021-01-01 | End: 2021-01-01

## 2021-01-01 RX ORDER — REMDESIVIR 5 MG/ML
INJECTION INTRAVENOUS
Refills: 0 | Status: DISCONTINUED | OUTPATIENT
Start: 2021-01-01 | End: 2021-01-01

## 2021-01-01 RX ORDER — ENOXAPARIN SODIUM 100 MG/ML
80 INJECTION SUBCUTANEOUS EVERY 12 HOURS
Refills: 0 | Status: DISCONTINUED | OUTPATIENT
Start: 2021-01-01 | End: 2022-01-01

## 2021-01-01 RX ORDER — GABAPENTIN 400 MG/1
2 CAPSULE ORAL
Qty: 0 | Refills: 0 | DISCHARGE

## 2021-01-01 RX ORDER — FUROSEMIDE 40 MG
1 TABLET ORAL
Qty: 0 | Refills: 0 | DISCHARGE

## 2021-01-01 RX ORDER — DEXAMETHASONE 0.5 MG/5ML
10 ELIXIR ORAL ONCE
Refills: 0 | Status: DISCONTINUED | OUTPATIENT
Start: 2021-01-01 | End: 2021-01-01

## 2021-01-01 RX ORDER — POTASSIUM CHLORIDE 20 MEQ
40 PACKET (EA) ORAL ONCE
Refills: 0 | Status: DISCONTINUED | OUTPATIENT
Start: 2021-01-01 | End: 2021-01-01

## 2021-01-01 RX ORDER — ACETAMINOPHEN 500 MG
2 TABLET ORAL
Qty: 0 | Refills: 0 | DISCHARGE

## 2021-01-01 RX ORDER — GUAIFENESIN/DEXTROMETHORPHAN 600MG-30MG
10 TABLET, EXTENDED RELEASE 12 HR ORAL EVERY 4 HOURS
Refills: 0 | Status: DISCONTINUED | OUTPATIENT
Start: 2021-01-01 | End: 2022-01-01

## 2021-01-01 RX ORDER — REMDESIVIR 5 MG/ML
200 INJECTION INTRAVENOUS EVERY 24 HOURS
Refills: 0 | Status: COMPLETED | OUTPATIENT
Start: 2021-01-01 | End: 2021-01-01

## 2021-01-01 RX ORDER — ASPIRIN/CALCIUM CARB/MAGNESIUM 324 MG
81 TABLET ORAL DAILY
Refills: 0 | Status: DISCONTINUED | OUTPATIENT
Start: 2021-01-01 | End: 2022-01-01

## 2021-01-01 RX ORDER — DEXAMETHASONE 0.5 MG/5ML
6 ELIXIR ORAL ONCE
Refills: 0 | Status: COMPLETED | OUTPATIENT
Start: 2021-01-01 | End: 2021-01-01

## 2021-01-01 RX ORDER — SENNA PLUS 8.6 MG/1
2 TABLET ORAL AT BEDTIME
Refills: 0 | Status: DISCONTINUED | OUTPATIENT
Start: 2021-01-01 | End: 2022-01-01

## 2021-01-01 RX ADMIN — Medication 6 MILLIGRAM(S): at 05:27

## 2021-01-01 RX ADMIN — Medication 40 MILLIGRAM(S): at 05:14

## 2021-01-01 RX ADMIN — ENOXAPARIN SODIUM 80 MILLIGRAM(S): 100 INJECTION SUBCUTANEOUS at 18:00

## 2021-01-01 RX ADMIN — SENNA PLUS 2 TABLET(S): 8.6 TABLET ORAL at 22:12

## 2021-01-01 RX ADMIN — Medication 81 MILLIGRAM(S): at 11:44

## 2021-01-01 RX ADMIN — Medication 6 MILLIGRAM(S): at 06:37

## 2021-01-01 RX ADMIN — SENNA PLUS 2 TABLET(S): 8.6 TABLET ORAL at 21:09

## 2021-01-01 RX ADMIN — REMDESIVIR 200 MILLIGRAM(S): 5 INJECTION INTRAVENOUS at 00:28

## 2021-01-01 RX ADMIN — CHLORHEXIDINE GLUCONATE 1 APPLICATION(S): 213 SOLUTION TOPICAL at 11:45

## 2021-01-01 RX ADMIN — Medication 1 PACKET(S): at 15:10

## 2021-01-01 RX ADMIN — ENOXAPARIN SODIUM 80 MILLIGRAM(S): 100 INJECTION SUBCUTANEOUS at 05:24

## 2021-01-01 RX ADMIN — Medication 6 MILLIGRAM(S): at 05:25

## 2021-01-01 RX ADMIN — AMLODIPINE BESYLATE 5 MILLIGRAM(S): 2.5 TABLET ORAL at 06:33

## 2021-01-01 RX ADMIN — ENOXAPARIN SODIUM 80 MILLIGRAM(S): 100 INJECTION SUBCUTANEOUS at 07:09

## 2021-01-01 RX ADMIN — ALBUTEROL 2 PUFF(S): 90 AEROSOL, METERED ORAL at 01:34

## 2021-01-01 RX ADMIN — Medication 100 MILLIEQUIVALENT(S): at 13:26

## 2021-01-01 RX ADMIN — REMDESIVIR 500 MILLIGRAM(S): 5 INJECTION INTRAVENOUS at 22:23

## 2021-01-01 RX ADMIN — CHLORHEXIDINE GLUCONATE 1 APPLICATION(S): 213 SOLUTION TOPICAL at 12:22

## 2021-01-01 RX ADMIN — SODIUM CHLORIDE 50 MILLILITER(S): 9 INJECTION, SOLUTION INTRAVENOUS at 23:52

## 2021-01-01 RX ADMIN — Medication 40 MILLIGRAM(S): at 06:37

## 2021-01-01 RX ADMIN — GABAPENTIN 200 MILLIGRAM(S): 400 CAPSULE ORAL at 06:37

## 2021-01-01 RX ADMIN — GABAPENTIN 200 MILLIGRAM(S): 400 CAPSULE ORAL at 06:25

## 2021-01-01 RX ADMIN — Medication 100 MILLIEQUIVALENT(S): at 12:20

## 2021-01-01 RX ADMIN — ENOXAPARIN SODIUM 80 MILLIGRAM(S): 100 INJECTION SUBCUTANEOUS at 18:42

## 2021-01-01 RX ADMIN — Medication 1 PACKET(S): at 07:09

## 2021-01-01 RX ADMIN — REMDESIVIR 500 MILLIGRAM(S): 5 INJECTION INTRAVENOUS at 22:34

## 2021-01-01 RX ADMIN — Medication 40 MILLIGRAM(S): at 07:09

## 2021-01-01 RX ADMIN — Medication 100 MILLIEQUIVALENT(S): at 17:23

## 2021-01-01 RX ADMIN — CHLORHEXIDINE GLUCONATE 1 APPLICATION(S): 213 SOLUTION TOPICAL at 11:48

## 2021-01-01 RX ADMIN — Medication 81 MILLIGRAM(S): at 11:47

## 2021-01-01 RX ADMIN — ENOXAPARIN SODIUM 80 MILLIGRAM(S): 100 INJECTION SUBCUTANEOUS at 18:32

## 2021-01-01 RX ADMIN — Medication 81 MILLIGRAM(S): at 18:33

## 2021-01-01 RX ADMIN — ENOXAPARIN SODIUM 80 MILLIGRAM(S): 100 INJECTION SUBCUTANEOUS at 05:26

## 2021-01-01 RX ADMIN — Medication 650 MILLIGRAM(S): at 17:38

## 2021-01-01 RX ADMIN — AMLODIPINE BESYLATE 5 MILLIGRAM(S): 2.5 TABLET ORAL at 07:09

## 2021-01-01 RX ADMIN — REMDESIVIR 500 MILLIGRAM(S): 5 INJECTION INTRAVENOUS at 21:38

## 2021-01-01 RX ADMIN — Medication 100 MILLIEQUIVALENT(S): at 14:23

## 2021-01-01 RX ADMIN — GABAPENTIN 200 MILLIGRAM(S): 400 CAPSULE ORAL at 17:21

## 2021-01-01 RX ADMIN — GABAPENTIN 200 MILLIGRAM(S): 400 CAPSULE ORAL at 17:10

## 2021-01-01 RX ADMIN — Medication 40 MILLIGRAM(S): at 05:26

## 2021-01-01 RX ADMIN — Medication 81 MILLIGRAM(S): at 12:22

## 2021-01-01 RX ADMIN — CHLORHEXIDINE GLUCONATE 1 APPLICATION(S): 213 SOLUTION TOPICAL at 11:46

## 2021-01-01 RX ADMIN — Medication 650 MILLIGRAM(S): at 16:17

## 2021-01-01 RX ADMIN — GABAPENTIN 200 MILLIGRAM(S): 400 CAPSULE ORAL at 05:27

## 2021-01-01 RX ADMIN — REMDESIVIR 500 MILLIGRAM(S): 5 INJECTION INTRAVENOUS at 21:07

## 2021-01-01 RX ADMIN — GABAPENTIN 200 MILLIGRAM(S): 400 CAPSULE ORAL at 18:33

## 2021-01-01 RX ADMIN — ENOXAPARIN SODIUM 80 MILLIGRAM(S): 100 INJECTION SUBCUTANEOUS at 06:26

## 2021-01-01 RX ADMIN — Medication 6 MILLIGRAM(S): at 09:06

## 2021-01-01 RX ADMIN — ENOXAPARIN SODIUM 80 MILLIGRAM(S): 100 INJECTION SUBCUTANEOUS at 17:10

## 2021-01-01 RX ADMIN — GABAPENTIN 200 MILLIGRAM(S): 400 CAPSULE ORAL at 07:09

## 2021-01-01 RX ADMIN — Medication 81 MILLIGRAM(S): at 11:40

## 2021-01-01 RX ADMIN — GABAPENTIN 200 MILLIGRAM(S): 400 CAPSULE ORAL at 05:50

## 2021-01-01 RX ADMIN — Medication 6 MILLIGRAM(S): at 05:50

## 2021-01-01 RX ADMIN — REMDESIVIR 500 MILLIGRAM(S): 5 INJECTION INTRAVENOUS at 21:08

## 2021-01-01 RX ADMIN — Medication 81 MILLIGRAM(S): at 12:43

## 2021-01-01 RX ADMIN — GABAPENTIN 200 MILLIGRAM(S): 400 CAPSULE ORAL at 05:14

## 2021-01-01 RX ADMIN — CHLORHEXIDINE GLUCONATE 1 APPLICATION(S): 213 SOLUTION TOPICAL at 11:40

## 2021-01-01 RX ADMIN — REMDESIVIR 500 MILLIGRAM(S): 5 INJECTION INTRAVENOUS at 21:50

## 2021-01-01 RX ADMIN — Medication 100 MILLIEQUIVALENT(S): at 18:21

## 2021-01-01 RX ADMIN — ENOXAPARIN SODIUM 80 MILLIGRAM(S): 100 INJECTION SUBCUTANEOUS at 17:21

## 2021-01-01 RX ADMIN — GABAPENTIN 200 MILLIGRAM(S): 400 CAPSULE ORAL at 17:18

## 2021-01-01 RX ADMIN — REMDESIVIR 500 MILLIGRAM(S): 5 INJECTION INTRAVENOUS at 23:30

## 2021-01-01 RX ADMIN — ENOXAPARIN SODIUM 80 MILLIGRAM(S): 100 INJECTION SUBCUTANEOUS at 18:09

## 2021-01-01 RX ADMIN — CHLORHEXIDINE GLUCONATE 1 APPLICATION(S): 213 SOLUTION TOPICAL at 11:39

## 2021-01-01 RX ADMIN — GABAPENTIN 200 MILLIGRAM(S): 400 CAPSULE ORAL at 18:42

## 2021-01-01 RX ADMIN — Medication 81 MILLIGRAM(S): at 11:36

## 2021-01-01 RX ADMIN — Medication 81 MILLIGRAM(S): at 16:23

## 2021-01-01 RX ADMIN — Medication 40 MILLIGRAM(S): at 06:26

## 2021-01-01 RX ADMIN — AMLODIPINE BESYLATE 5 MILLIGRAM(S): 2.5 TABLET ORAL at 05:14

## 2021-01-01 RX ADMIN — CHLORHEXIDINE GLUCONATE 1 APPLICATION(S): 213 SOLUTION TOPICAL at 14:50

## 2021-01-01 RX ADMIN — GABAPENTIN 200 MILLIGRAM(S): 400 CAPSULE ORAL at 05:24

## 2021-01-01 RX ADMIN — SODIUM CHLORIDE 50 MILLILITER(S): 9 INJECTION, SOLUTION INTRAVENOUS at 19:00

## 2021-01-01 RX ADMIN — REMDESIVIR 500 MILLIGRAM(S): 5 INJECTION INTRAVENOUS at 21:31

## 2021-01-01 RX ADMIN — GABAPENTIN 200 MILLIGRAM(S): 400 CAPSULE ORAL at 06:34

## 2021-01-01 RX ADMIN — Medication 650 MILLIGRAM(S): at 17:11

## 2021-01-01 RX ADMIN — AMLODIPINE BESYLATE 5 MILLIGRAM(S): 2.5 TABLET ORAL at 05:27

## 2021-01-01 RX ADMIN — Medication 40 MILLIGRAM(S): at 06:33

## 2021-01-01 RX ADMIN — GABAPENTIN 200 MILLIGRAM(S): 400 CAPSULE ORAL at 17:59

## 2021-01-01 RX ADMIN — GABAPENTIN 200 MILLIGRAM(S): 400 CAPSULE ORAL at 16:23

## 2021-01-01 RX ADMIN — Medication 6 MILLIGRAM(S): at 06:24

## 2021-01-01 RX ADMIN — Medication 40 MILLIGRAM(S): at 05:50

## 2021-01-01 RX ADMIN — Medication 6 MILLIGRAM(S): at 05:15

## 2021-01-01 RX ADMIN — AMLODIPINE BESYLATE 5 MILLIGRAM(S): 2.5 TABLET ORAL at 06:26

## 2021-01-01 RX ADMIN — ENOXAPARIN SODIUM 80 MILLIGRAM(S): 100 INJECTION SUBCUTANEOUS at 05:14

## 2021-01-01 RX ADMIN — ENOXAPARIN SODIUM 80 MILLIGRAM(S): 100 INJECTION SUBCUTANEOUS at 16:23

## 2021-01-01 RX ADMIN — AMLODIPINE BESYLATE 5 MILLIGRAM(S): 2.5 TABLET ORAL at 05:50

## 2021-01-01 RX ADMIN — GABAPENTIN 200 MILLIGRAM(S): 400 CAPSULE ORAL at 18:09

## 2021-01-01 RX ADMIN — CHLORHEXIDINE GLUCONATE 1 APPLICATION(S): 213 SOLUTION TOPICAL at 12:48

## 2021-01-01 RX ADMIN — ENOXAPARIN SODIUM 80 MILLIGRAM(S): 100 INJECTION SUBCUTANEOUS at 06:44

## 2021-01-01 RX ADMIN — Medication 40 MILLIEQUIVALENT(S): at 11:45

## 2021-01-01 RX ADMIN — Medication 40 MILLIGRAM(S): at 05:24

## 2021-01-01 RX ADMIN — AMLODIPINE BESYLATE 5 MILLIGRAM(S): 2.5 TABLET ORAL at 05:24

## 2021-01-01 RX ADMIN — Medication 6 MILLIGRAM(S): at 17:38

## 2021-01-01 RX ADMIN — Medication 6 MILLIGRAM(S): at 07:07

## 2021-01-01 RX ADMIN — ENOXAPARIN SODIUM 80 MILLIGRAM(S): 100 INJECTION SUBCUTANEOUS at 05:50

## 2021-12-23 NOTE — H&P ADULT - NSHPOUTPATIENTPROVIDERS_GEN_ALL_CORE
PCP= Dandre Moore  Cardiologist = Dr. Inessa Cooney PCP= Dandre Moore   Cardiologist = Dr. Inessa Cooney

## 2021-12-23 NOTE — H&P ADULT - NSHPSOCIALHISTORY_GEN_ALL_CORE
Patient lives with . Ambulates with cane. Former smoker quit 1988 denies toxic habits. Denies drinking alcohol

## 2021-12-23 NOTE — H&P ADULT - PROBLEM SELECTOR PLAN 2
+ COVID on 15L NRB  - inflammatory markers sent  - procalcitonin wnl  - Ferritin 503    -CXR showing: Diffuse bilateral patchy opacities, most predominantly within the lower lobes, could correlate with Covid 19 pneumonia.  - pt reports she finished 5 day course of Zpak last week  - monitor off abx for now   - albuterol prn shortness of breath/wheezing  - incentive spirometry  - tessalon pearles prn   - pt is s/p Decadron 6mg x 1 in ER  - Continue IV Decadron x 9 day  - Start Remdesivir x 5 day + COVID on 15L NRB now escalated to HFNC  - inflammatory markers sent  - procalcitonin wnl  - Ferritin 503    -CXR showing: Diffuse bilateral patchy opacities, most predominantly within the lower lobes likely 2/2 COVID  - pt reports she finished 5 day course of Zpak last week  - monitor off abx for now   - albuterol prn shortness of breath/wheezing  - incentive spirometry  - tessalon pearles prn   - pt is s/p Decadron 6mg x 1 in ER  - Continue IV Decadron x 9 day  - Start Remdesivir x 5 day

## 2021-12-23 NOTE — ED ADULT NURSE NOTE - OBJECTIVE STATEMENT
received pt in room 8, 76 yr/o female A+Ox4, ambulatory at baseline. PMH of HTN. Presented to the ED C/O covid positive with worsening SOB x1 day. pt presented to the ED hypoxic, SpO2 is 80 on 6L NC, nonbreather at 15L applied SpO2 is now 91. pt denies feelings of chest pain and SOB, RR are even. denies cough, congestion. abdomen is soft nontender denies N/V/D/C. B/L LE edema noted. left AC 20g placed, labs drawn and sent. medications given as ordered. will continue to monitor.

## 2021-12-23 NOTE — ED ADULT NURSE REASSESSMENT NOTE - NS ED NURSE REASSESS COMMENT FT1
pt is resting comfortably in stretcher, denies chest pain and SOB. nonrebreather mask continued. will continue to monitor.

## 2021-12-23 NOTE — ED PROVIDER NOTE - OBJECTIVE STATEMENT
76F pmh HTN not vaccinated presents to the ED for shortness of breath, covid + x1 day.  has been hospitalized w/ covid x20 days, pt states she felt OK, sister thought she sounded shortness of breath, sent off a rapid covid test which came back positive. Pt's O2 sat on 6L NC in triage 80. Pt states she has no complaints, didn't know she was covid positive, maybe she was having some mild shortness of breath, no chest pain, no abdominal pain, no n/v/d, no urinary complaints. 76 yr old F c HTN not vaccinated to COVID presents to the ED for shortness of breath, covid+ x1 day.  has been hospitalized w/ covid x20 days, pt states she felt OK, sister thought she sounded shortness of breath, sent off a rapid covid test which came back positive. Pt's O2 sat in triage <80% ORA. Pt states she has no complaints, didn't know she was covid positive, maybe she was having some mild shortness of breath, no chest pain, no abdominal pain, no n/v/d, no urinary complaints.

## 2021-12-23 NOTE — H&P ADULT - HISTORY OF PRESENT ILLNESS
76 year old Female PMHx HTN, Asthma as a child (No hx of intubations/hospitalizations, ?CHF on Lasix, Osteoarthritis presenting with fatigue and dyspnea. Patient report she was exposed to sick contact-  + COVID x 20 days ago. Patients  is currently admitted at [facility]. Patient states she did not have any symptoms however her sister who works as a nurse noted pt appeared dyspneic. She sent a rapid test today that came back + COVID. Notfified PCP - Dr. Cherise Amos who recommended her to go to the hospital. Pt reports she took Zpak on 12/17 as a preventative measure. Patient is unvaccinated, denies having COVID in the past. Has hx of chronic LE swelling is on PO lasix.  Denies hx of PE/DVT, fever, body aches, chills, headache, vision change, chest pain, abdominal pain, nausea, vomiting, melena, hematochezia, dysuria, urinary frequency, calf pain.     In ED vitals: Temp 98F HR 75 /83 SP02 initially 70s on NC currently 95% on 15L NRB   Labs- Ddimer - 532 Procalcitonin - 0.08 Ferritin 503  Patient is s/p PO tylenol and IV Decadron 6mg x1.  76 year old Female PMHx HTN, Asthma as a child (No hx of intubations/hospitalizations, Venous insufficiency, ?CHF on Lasix, Osteoarthritis presenting with fatigue, productive cough, and dyspnea. Patient report she was exposed to sick contact-  + COVID x 20 days ago. Patients  is currently admitted at [facility]. Patient states she did not have any symptoms however her sister who works as a nurse noted pt appeared dyspneic. She sent a rapid test today that came back + COVID. Notified PCP - Dr. Cherise Amos who recommended her to go to the hospital. Pt reports she took Zpak on 12/17 as a preventative measure. Patient is unvaccinated, denies having COVID in the past. Has hx of chronic LE swelling is on PO lasix.  Denies hx of PE/DVT, fever, body aches, chills, headache, vision change, chest pain, abdominal pain, nausea, vomiting, melena, hematochezia, dysuria, urinary frequency, calf pain.     In ED vitals: Temp 98F HR 75 /83 SP02 initially 70s on NC currently 95% on 15L NRB   Labs- Ddimer - 532 Procalcitonin - 0.08 Ferritin 503  Patient is s/p PO tylenol and IV Decadron 6mg x1.  76 year old Female PMHx HTN, Asthma as a child (No hx of intubations/hospitalizations, Venous insufficiency, Lymphedema  Osteoarthritis presenting with fatigue, productive cough, and dyspnea. Patient report she was exposed to sick contact-  + COVID x 20 days ago. Patients  is currently admitted at [facility]. Patient states she did not have any symptoms however her sister who works as a nurse noted pt appeared dyspneic check Sp02 at home 66% She sent a rapid test today that came back + COVID. Notified PCP - Dr. Cherise Amos who recommended her to go to the hospital. Pt reports she took Zpak on 12/17 as a preventative measure. Patient is unvaccinated, denies having COVID in the past. Has hx of chronic LE swelling is on PO lasix.  Denies hx of PE/DVT, fever, body aches, chills, headache, vision change, chest pain, abdominal pain, nausea, vomiting, melena, hematochezia, dysuria, urinary frequency, calf pain.     In ED vitals: Temp 98F HR 75 /83 SP02 initially 70s on NC currently 95% on 15L NRB   Labs- Ddimer - 532 Procalcitonin - 0.08 Ferritin 503  Patient is s/p PO tylenol and IV Decadron 6mg x1.  76 year old Female PMHx HTN, Asthma as a child (No hx of intubations/hospitalizations, Venous insufficiency, Lymphedema  Osteoarthritis presenting with fatigue, productive cough, and dyspnea. Patient report she was exposed to sick contact-  + COVID x 20 days ago. Patients  is currently admitted at [facility]. Patient states she did not have any symptoms however her sister who works as a nurse noted pt appeared dyspneic check Sp02 at home 66% She sent a rapid test today that came back + COVID. Notified PCP - Dr. Cherise Amos who recommended her to go to the hospital. Pt reports she took Zpak on 12/17 as a preventative measure and completed course. Endorses some decreased appetite and SOB now improved at rest on HFNC. Patient is unvaccinated, denies having COVID in the past. Has hx of chronic LE swelling is on PO lasix.  Denies hx of PE/DVT, fever, body aches, headache, vision change, chest pain, abdominal pain, nausea, vomiting, melena, hematochezia, dysuria, urinary frequency, calf pain.     In ED vitals: Temp 98F HR 75 /83 SP02 initially 70s on NC currently 95% on 15L NRB - escalated to HFNC  Labs- Ddimer - 532 Procalcitonin - 0.08 Ferritin 503  Patient is s/p PO tylenol and IV Decadron 6mg x1.

## 2021-12-23 NOTE — PROGRESS NOTE ADULT - ASSESSMENT
Labs, Radiology, Cardiology, and Other Results: Labs reviewed by me:                        13.4   7.86  )-----------( 294      ( 23 Dec 2021 17:56 )             41.6     12-23    142  |  101  |  26<H>  ----------------------------<  123<H>  4.1   |  24  |  0.72    Ca    9.3      23 Dec 2021 17:56    TPro  7.0  /  Alb  3.8  /  TBili  0.3  /  DBili  x   /  AST  47<H>  /  ALT  20  /  AlkPhos  84  12-23      Imaging personally reviewed below:  < from: Xray Chest 1 View- PORTABLE-Urgent (12.23.21 @ 17:36) >      INTERPRETATION:  CLINICAL INDICATION:  Shortness of Breath, Cough, Fever.   Covid 19 positive for one day.    COMPARISON: None available.    TECHNIQUE: Frontal radiograph of the chest.    FINDINGS:    Cardiac/mediastinum/hilum: Heart size cannot be accurately assessed on   this projection.    Lung parenchyma/Pleura: Bilateral patchy opacities, most predominantly   within the lower lobes. There is no pleural effusion. There is no   pneumothorax.    Skeleton/soft tissues: Right shoulder acromioclavicular joint arthrosis   and glenohumeral joint arthritic changes. Sclerotic changes of the right   humeral head. Multiple round to ovoid osseous bodies projecting over the   proximal right humerus.    IMPRESSION:    Diffuse bilateral patchy opacities, most predominantly within the lower   lobes, could correlate with Covid 19 pneumonia.             D-Dimer Assay: 532 ng/mL DDU<H> (12-23-21 @ 17:56)        COVID-19/Full RVP Panel:    EKG personally reviewed and my interpretation is NSR @@75bpm, no ST depressions or elevations      Assessment and Plan:    IMPROVE-DD VTE Risk Assessment ([facility] Only):  · Risk Assessment Status	IMPROVE-DD Assessment completed: Dec 23 2021  8:36PM     Assessment:  · Assessment	  76 year old Female PMHx HTN, Asthma as a child (No hx of intubations/hospitalizations, Venous insufficiency, Lymphedema  Osteoarthritis presenting with fatigue, productive cough, and dyspnea admitted for acute hypoxic respiratory failure 2/2 COVID pneumonia      Problem/Plan - 1:  ·  Problem: Acute respiratory failure with hypoxia.   ·  Plan: - pt initially SP02 70s on NC in ER  - currently Sp02 95% on 15L NRB now escalated to HFNC and comfortable sats >95%  - CXR showing COVID pneumonia  - start Remdesivir/continue decadron  - Ddimer 532  - check proBNP  - monitor on continuous pulse ox  - wean 02 as tolerated.     Problem/Plan - 2:  ·  Problem: Pneumonia due to COVID-19 virus.   ·  Plan: + COVID on 15L NRB now escalated to HFNC  - inflammatory markers sent  - procalcitonin wnl  - Ferritin 503    -CXR showing: Diffuse bilateral patchy opacities, most predominantly within the lower lobes likely 2/2 COVID  - pt reports she finished 5 day course of Zpak last week  - monitor off abx for now   - albuterol prn shortness of breath/wheezing  - incentive spirometry  - tessalon pearles prn   - pt is s/p Decadron 6mg x 1 in ER  - Continue IV Decadron x 9 day  - Start Remdesivir x 5 day.     Problem/Plan - 3:  ·  Problem: Lower extremity edema.   ·  Plan: - pt with hx of lymphedema   - reports she is on PO lasix 40mg QD  - continue home med   - check proBNP in AM.     Problem/Plan - 4:  ·  Problem: Hypertension.   ·  Plan: - hx of HTN  - On Norvasc 5mg QD at home  - continue home meds  - DASH diet.     Problem/Plan - 5:  ·  Problem: Need for prophylactic measure.   ·  Plan: DVT ppx: Lovenox SQ 40mg BID.

## 2021-12-23 NOTE — PROGRESS NOTE ADULT - SUBJECTIVE AND OBJECTIVE BOX
RENNY PERSAUD Female  Patient is a 76y old  Female who presents with a chief complaint of + Acute hypoxic respiratory failure 2/2 COVID pneumonia (24 Dec 2021 21:50)      INTERVAL HPI/OVERNIGHT EVENTS:  HPI:  76 year old Female PMHx HTN, Asthma as a child (No hx of intubations/hospitalizations, Venous insufficiency, Lymphedema  Osteoarthritis presenting with fatigue, productive cough, and dyspnea. Patient report she was exposed to sick contact-  + COVID x 20 days ago. Patients  is currently admitted at [facility]. Patient states she did not have any symptoms however her sister who works as a nurse noted pt appeared dyspneic check Sp02 at home 66% She sent a rapid test today that came back + COVID. Notified PCP - Dr. Cherise Amos who recommended her to go to the hospital. Pt reports she took Zpak on 12/17 as a preventative measure and completed course. Endorses some decreased appetite and SOB now improved at rest on HFNC. Patient is unvaccinated, denies having COVID in the past. Has hx of chronic LE swelling is on PO lasix.  Denies hx of PE/DVT, fever, body aches, headache, vision change, chest pain, abdominal pain, nausea, vomiting, melena, hematochezia, dysuria, urinary frequency, calf pain.     In ED vitals: Temp 98F HR 75 /83 SP02 initially 70s on NC currently 95% on 15L NRB - escalated to HFNC  Labs- Ddimer - 532 Procalcitonin - 0.08 Ferritin 503  Patient is s/p PO tylenol and IV Decadron 6mg x1.  (23 Dec 2021 20:08)      FAMILY HISTORY:  Family history of heart disease (Father)      Allergies    Bactrim (Unknown)  Betadine (Unknown)  latex (Unknown)  sulfa drugs (Unknown)    Intolerances      MEDICATIONS  (STANDING):  aspirin enteric coated 81 milliGRAM(s) Oral daily  chlorhexidine 2% Cloths 1 Application(s) Topical daily  dexAMETHasone  Injectable 6 milliGRAM(s) IV Push daily  enoxaparin Injectable 80 milliGRAM(s) SubCutaneous every 12 hours  furosemide    Tablet 40 milliGRAM(s) Oral daily  gabapentin 200 milliGRAM(s) Oral two times a day  potassium phosphate / sodium phosphate Powder (PHOS-NaK) 1 Packet(s) Oral once  remdesivir  IVPB 100 milliGRAM(s) IV Intermittent every 24 hours  remdesivir  IVPB   IV Intermittent   senna 2 Tablet(s) Oral at bedtime    MEDICATIONS  (PRN):  acetaminophen     Tablet .. 650 milliGRAM(s) Oral every 4 hours PRN Temp greater or equal to 38C (100.4F), Mild Pain (1 - 3), Moderate Pain (4 - 6)  ALBUTerol    90 MICROgram(s) HFA Inhaler 2 Puff(s) Inhalation every 4 hours PRN Shortness of Breath and/or Wheezing  benzonatate 200 milliGRAM(s) Oral three times a day PRN Cough  guaifenesin/dextromethorphan Oral Liquid 10 milliLiter(s) Oral every 4 hours PRN Cough    REVIEW OF SYSTEMS:  CONSTITUTIONAL: No fever, weight loss, or fatigue  RESPIRATORY: No cough, wheezing, chills or hemoptysis; No shortness of breath  CARDIOVASCULAR: No chest pain, palpitations, dizziness, or leg swelling  GASTROINTESTINAL: No abdominal or epigastric pain. No nausea, vomiting, or hematemesis; No diarrhea or constipation. No melena or hematochezia.  NEUROLOGICAL: No headaches, memory loss, loss of strength, numbness, or tremors  MUSCULOSKELETAL: No joint pain or swelling; No muscle, back, or extremity pain  SKIN: No rashes or lesions    Vital Signs Last 24 Hrs  T(C): 36.9 (24 Dec 2021 18:15), Max: 37 (24 Dec 2021 08:03)  T(F): 98.4 (24 Dec 2021 18:15), Max: 98.6 (24 Dec 2021 08:03)  HR: 76 (24 Dec 2021 19:04) (71 - 92)  BP: 145/70 (24 Dec 2021 18:15) (107/77 - 145/70)  BP(mean): 85 (24 Dec 2021 01:35) (85 - 85)  RR: 20 (24 Dec 2021 18:15) (20 - 25)  SpO2: 98% (24 Dec 2021 19:04) (92% - 98%)    T(C): 36.9 (12-24-21 @ 18:15), Max: 37 (12-24-21 @ 08:03)  HR: 76 (12-24-21 @ 19:04) (71 - 92)  BP: 145/70 (12-24-21 @ 18:15) (107/77 - 145/70)  RR: 20 (12-24-21 @ 18:15) (20 - 25)  SpO2: 98% (12-24-21 @ 19:04) (92% - 98%)  Daily     Daily   CBC Full  -  ( 23 Dec 2021 17:56 )  WBC Count : 7.86 K/uL  RBC Count : 4.38 M/uL  Hemoglobin : 13.4 g/dL  Hematocrit : 41.6 %  Platelet Count - Automated : 294 K/uL  Mean Cell Volume : 95.0 fL  Mean Cell Hemoglobin : 30.6 pg  Mean Cell Hemoglobin Concentration : 32.2 gm/dL  Auto Neutrophil # : 5.58 K/uL  Auto Lymphocyte # : 1.32 K/uL  Auto Monocyte # : 0.68 K/uL  Auto Eosinophil # : 0.00 K/uL  Auto Basophil # : 0.03 K/uL  Auto Neutrophil % : 70.9 %  Auto Lymphocyte % : 16.8 %  Auto Monocyte % : 8.7 %  Auto Eosinophil % : 0.0 %  Auto Basophil % : 0.4 %        PHYSICAL EXAM:  GENERAL: NAD, well-groomed, well-developed  HEAD:  Atraumatic, Normocephalic  EYES: EOMI, PERRLA, conjunctiva and sclera clear  NECK: Supple, No JVD  NERVOUS SYSTEM:  Alert & Oriented X3, No gross focal deficits  CHEST/LUNG: Clear to percussion bilaterally; No rales, rhonchi, wheezing, or rubs  HEART: Regular rate and rhythm; S1S2 present  ABDOMEN: Soft, Nontender, Nondistended; Bowel sounds present  EXTREMITIES:  No clubbing, cyanosis, or edema  SKIN: No rashes or lesions    LABS:                        13.4   7.86  )-----------( 294      ( 23 Dec 2021 17:56 )             41.6   12-24    145  |  106  |  14  ----------------------------<  144<H>  4.0   |  23  |  0.41<L>    Ca    8.9      24 Dec 2021 07:27  Phos  2.4     12-24  Mg     1.90     12-24    TPro  x   /  Alb  x   /  TBili  0.2  /  DBili  x   /  AST  x   /  ALT  x   /  AlkPhos  x   12-24        CAPILLARY BLOOD GLUCOSE          HEALTH ISSUES - PROBLEM Dx:  Acute respiratory failure with hypoxia    Pneumonia due to COVID-19 virus    Hypertension    Lower extremity edema    Need for prophylactic measure            I&O's Summary    24 Dec 2021 07:01  -  24 Dec 2021 22:08  --------------------------------------------------------  IN: 625 mL / OUT: 1525 mL / NET: -900 mL      Last Menstrual Period    RADIOLOGY & ADDITIONAL TESTS:    Imaging Personally Reviewed:  [x ] YES  [ ] NO    Consultant(s) Notes Reviewed:  [ ] YES  [ ] NO    Care Discussed with Consultants/Other Providers [ ] YES  [ ] NO    Plan of Care discussed with Housestaff [ ]YES [ ] NO

## 2021-12-23 NOTE — H&P ADULT - NSHPLABSRESULTS_GEN_ALL_CORE
Labs:                        13.4   7.86  )-----------( 294      ( 23 Dec 2021 17:56 )             41.6     12-23    142  |  101  |  26<H>  ----------------------------<  123<H>  4.1   |  24  |  0.72    Ca    9.3      23 Dec 2021 17:56    TPro  7.0  /  Alb  3.8  /  TBili  0.3  /  DBili  x   /  AST  47<H>  /  ALT  20  /  AlkPhos  84  12-23  < from: Xray Chest 1 View- PORTABLE-Urgent (12.23.21 @ 17:36) >    INTERPRETATION:  CLINICAL INDICATION:  Shortness of Breath, Cough, Fever.   Covid 19 positive for one day.    COMPARISON: None available.    TECHNIQUE: Frontal radiograph of the chest.    FINDINGS:    Cardiac/mediastinum/hilum: Heart size cannot be accurately assessed on   this projection.    Lung parenchyma/Pleura: Bilateral patchy opacities, most predominantly   within the lower lobes. There is no pleural effusion. There is no   pneumothorax.    Skeleton/soft tissues: Right shoulder acromioclavicular joint arthrosis   and glenohumeral joint arthritic changes. Sclerotic changes of the right   humeral head. Multiple round to ovoid osseous bodies projecting over the   proximal right humerus.    IMPRESSION:    Diffuse bilateral patchy opacities, most predominantly within the lower   lobes, could correlate with Covid 19 pneumonia.             D-Dimer Assay: 532 ng/mL DDU<H> (12-23-21 @ 17:56)        COVID-19/Full RVP Panel: Labs reviewed by me:                        13.4   7.86  )-----------( 294      ( 23 Dec 2021 17:56 )             41.6     12-23    142  |  101  |  26<H>  ----------------------------<  123<H>  4.1   |  24  |  0.72    Ca    9.3      23 Dec 2021 17:56    TPro  7.0  /  Alb  3.8  /  TBili  0.3  /  DBili  x   /  AST  47<H>  /  ALT  20  /  AlkPhos  84  12-23      Imaging personally reviewed below:  < from: Xray Chest 1 View- PORTABLE-Urgent (12.23.21 @ 17:36) >      INTERPRETATION:  CLINICAL INDICATION:  Shortness of Breath, Cough, Fever.   Covid 19 positive for one day.    COMPARISON: None available.    TECHNIQUE: Frontal radiograph of the chest.    FINDINGS:    Cardiac/mediastinum/hilum: Heart size cannot be accurately assessed on   this projection.    Lung parenchyma/Pleura: Bilateral patchy opacities, most predominantly   within the lower lobes. There is no pleural effusion. There is no   pneumothorax.    Skeleton/soft tissues: Right shoulder acromioclavicular joint arthrosis   and glenohumeral joint arthritic changes. Sclerotic changes of the right   humeral head. Multiple round to ovoid osseous bodies projecting over the   proximal right humerus.    IMPRESSION:    Diffuse bilateral patchy opacities, most predominantly within the lower   lobes, could correlate with Covid 19 pneumonia.             D-Dimer Assay: 532 ng/mL DDU<H> (12-23-21 @ 17:56)        COVID-19/Full RVP Panel:    EKG personally reviewed and my interpretation is NSR @@75bpm, no ST depressions or elevations

## 2021-12-23 NOTE — ED ADULT NURSE NOTE - CHIEF COMPLAINT QUOTE
Pt recently covid positive  today c/o sob.  Denies chest pain, N/V/D.  Pt's  in [place] with covid/pna.  Pt O2 75% on 4 L  NC at triage. Pt took  tylenol this  am.  As per sister", she called pt and she was out of breath"

## 2021-12-23 NOTE — ED PROVIDER NOTE - CLINICAL SUMMARY MEDICAL DECISION MAKING FREE TEXT BOX
76F pmh HTN not vaccinated presents to the ED for shortness of breath, covid + x1 day.  has been hospitalized w/ covid x20 days, pt states she felt OK, sister thought she sounded shortness of breath, sent off a rapid covid test which came back positive. Pt's O2 sat on 6L NC in triage 80, admission for covid hypoxia but otherwise pt feeling well, supplemental O2 therapy, steroids, labs, admission. 76F c HTN not COVID vaccinated presents to the ED for shortness of breath, covid + x1 day. Pt's O2 sat ORA in triage <80%.  Is in respiratory distress, tachypneic, increased WOB, but denies Sx.  Admission for covid hypoxia but otherwise pt feeling well, supplemental O2 therapy, steroids, labs, admission.

## 2021-12-23 NOTE — ED ADULT TRIAGE NOTE - CHIEF COMPLAINT QUOTE
Pt recently covid positive  today c/o sob.  Denies chest pain, N/V/D.  Pt's  in [place] with covid/pna.  Pt O2 75% on 4 L  NC at triage. Pt took  tylenol this  am Pt recently covid positive  today c/o sob.  Denies chest pain, N/V/D.  Pt's  in [place] with covid/pna.  Pt O2 75% on 4 L  NC at triage. Pt took  tylenol this  am.  Charge RN called re pt's status Pt recently covid positive  today c/o sob.  Denies chest pain, N/V/D.  Pt's  in [place] with covid/pna.  Pt O2 75% on 4 L  NC at triage. Pt took  tylenol this  am.  As per sister", she called pt and she was out of breath"

## 2021-12-23 NOTE — ED PROVIDER NOTE - NS ED ROS FT
Constitutional:  (-) fever, (-) chills, (-) lethargy  Eyes:  (-) eye pain (-) visual changes  ENMT: (-) nasal discharge, (-) sore throat. (-) neck pain or stiffness  Cardiac: (-) chest pain (-) palpitations  Respiratory:  (-) cough (+)SOB  GI:  (-) nausea (-) vomiting (-) diarrhea (-) abdominal pain.  :  (-) dysuria (-) frequency (-) burning.  MS:  (-) back pain (-) joint pain.  Neuro:  (-) headache (-) numbness (-) tingling (-) focal weakness  Skin:  (-) rash  Except as documented in the HPI,  all other systems are negative

## 2021-12-23 NOTE — H&P ADULT - ATTENDING COMMENTS
76 year old Female PMHx HTN, Asthma as a child (No hx of intubations/hospitalizations, Venous insufficiency, Lymphedema  Osteoarthritis presenting with fatigue, productive cough, and dyspnea admitted for acute hypoxic respiratory failure 2/2 COVID pneumonia/ C/w remdesivir, decadron, lovenox, continuous pulse ox monitoring, cough suppressants. Currently on HFNC breathing well sat >95%.

## 2021-12-23 NOTE — H&P ADULT - PROBLEM SELECTOR PLAN 3
- pt with hx of le edema  - reports she is on PO lasix 40mg QD  - check proBNP - pt with hx of lymphedema   - reports she is on PO lasix 40mg QD  - continue home med   - check proBNP in AM

## 2021-12-23 NOTE — ED ADULT NURSE REASSESSMENT NOTE - NS ED NURSE REASSESS COMMENT FT1
Patient is awake, A&O x 4, NAD, ambulatory with cane, presents to ED for SOB.  She had a positive at home covid test today and was found to be SOB at home and her sister called EMS.  Patient arrived on 4L oxygen, sat @ 75%.  Currently, patient on NRB @15L, sat @ 92%.  Respirations equal and unlabored.  Denies chest pain, SOB, weakness or dizziness.  Vitals taken, connected to cardiac monitor and pulse ox.  Admitted to Med and awaiting a bed.

## 2021-12-23 NOTE — ED PROVIDER NOTE - CARE PLAN
1 Principal Discharge DX:	2019 novel coronavirus disease (COVID-19)   Principal Discharge DX:	2019 novel coronavirus disease (COVID-19)  Secondary Diagnosis:	Acute respiratory failure with hypoxia

## 2021-12-23 NOTE — H&P ADULT - ASSESSMENT
76 year old Female PMHx HTN, Asthma as a child (No hx of intubations/hospitalizations, Venous insufficiency, ?CHF on Lasix, Osteoarthritis presenting with fatigue, productive cough, and dyspnea admitted for acute hypoxic respiratory failure 2/2 COVID pneumonia  76 year old Female PMHx HTN, Asthma as a child (No hx of intubations/hospitalizations, Venous insufficiency, Lymphedema  Osteoarthritis presenting with fatigue, productive cough, and dyspnea admitted for acute hypoxic respiratory failure 2/2 COVID pneumonia

## 2021-12-23 NOTE — ED PROVIDER NOTE - ATTENDING CONTRIBUTION TO CARE
Attending Attestation: Dr. Garnica  I have personally performed a history and physical examination of the patient and discussed management with the resident as well as the patient.  I reviewed the resident's note and agree with the documented findings and plan of care.  I have authored and modified critical sections of the Provider Note, including but not limited to HPI, Physical Exam and MDM. 76F c HTN not COVID vaccinated presents to the ED for shortness of breath, covid + x1 day. Pt's O2 sat ORA in triage <80%.  Is in respiratory distress, tachypneic, increased WOB, but denies Sx.  Admission for covid hypoxia but otherwise pt feeling well, supplemental O2 therapy, steroids, labs, admission.    Upon my evaluation, this patient had a high probability of imminent or life-threatening deterioration due to concern for COVID with hypoxic respiratory failure which required my direct attention, intervention, and personal management.  The patient has a  medical condition that impairs one or more vital organ systems.  Frequent personal assessment and adjustment of medical interventions was performed.       I have personally provided 44 minutes of critical care time exclusive of time spent on separately billable procedures. Time includes review of laboratory data, radiology results, discussion with consultants, patient and family; monitoring for potential decompensation, as well as time spent retrieving data and reviewing the chart and documenting the visit. Interventions were performed as documented above.

## 2021-12-23 NOTE — H&P ADULT - PROBLEM SELECTOR PLAN 1
- pt initially SP02 70s on NC in ER  - currently Sp02 95% on 15L NRB  - CXR showing COVID pneumonia  - start Remdesivir/continue decadron  - Ddimer 532  - check proBNP  - monitor on continuous pulse ox  - wean 02 as tolerated - pt initially SP02 70s on NC in ER  - currently Sp02 95% on 15L NRB now escalated to HFNC and comfortable sats >95%  - CXR showing COVID pneumonia  - start Remdesivir/continue decadron  - Ddimer 532  - check proBNP  - monitor on continuous pulse ox  - wean 02 as tolerated

## 2021-12-23 NOTE — H&P ADULT - NSHPPHYSICALEXAM_GEN_ALL_CORE
Vital Signs Last 24 Hrs  T(C): 36.7 (23 Dec 2021 19:10), Max: 37 (23 Dec 2021 16:15)  T(F): 98 (23 Dec 2021 19:10), Max: 98.6 (23 Dec 2021 16:15)  HR: 75 (23 Dec 2021 19:10) (75 - 95)  BP: 120/83 (23 Dec 2021 19:10) (118/78 - 120/83)  BP(mean): --  RR: 24 (23 Dec 2021 19:10) (16 - 24)  SpO2: 95% (23 Dec 2021 19:10) (75% - 95%)    PHYSICAL EXAM:  General: Elderly female A+Ox3   Head: Normocephalic, atraumatic.    Eyes: PERRL.  EOMI.  No scleral icterus.   Neck: Supple.  Full range of motion.  Heart: RRR + S1 S2   Lungs: + Bibasilar crackles Sp02 94% on 15L NRB   Abdomen: Soft nontender + BS x 4   Skin: Warm and dry.  No rashes.  Extremities: No cyanosis.  2+ peripheral pulses b/l.  2+ b/l le edema (pt reports chronic) no calf tenderness   Musculoskeletal: No joint deformities.  No spinal or paraspinal tenderness. Vital Signs Last 24 Hrs  T(C): 36.7 (23 Dec 2021 19:10), Max: 37 (23 Dec 2021 16:15)  T(F): 98 (23 Dec 2021 19:10), Max: 98.6 (23 Dec 2021 16:15)  HR: 75 (23 Dec 2021 19:10) (75 - 95)  BP: 120/83 (23 Dec 2021 19:10) (118/78 - 120/83)  BP(mean): --  RR: 24 (23 Dec 2021 19:10) (16 - 24)  SpO2: 95% (23 Dec 2021 19:10) (75% - 95%)    PHYSICAL EXAM:  General: Elderly female A+Ox3, NAD, well-developed  Head: Normocephalic, atraumatic.    Eyes: PERRL.  EOMI.  No scleral icterus.   Neck: Supple.  Full range of motion.  Heart: RRR + S1 S2, bilateral leg edema non-pitting and chronic  Lungs: + Bibasilar crackles Sp02 94% on HFNC, not tachypneic, no accessory muscle use  Abdomen: Soft nontender + BS x 4   Skin: Warm and dry.  No rashes.  Extremities: No cyanosis.  2+ peripheral pulses b/l.  2+ b/l le edema (pt reports chronic) no calf tenderness   Musculoskeletal: No joint deformities.  No spinal or paraspinal tenderness.

## 2021-12-23 NOTE — ED PROVIDER NOTE - PHYSICAL EXAMINATION
CONSTITUTIONAL: NAD  SKIN: Warm dry, normal skin turgor  HEAD: NCAT  EYES: EOMI, PERRLA, no scleral icterus, conjunctiva pink  ENT: normal pharynx with no erythema or exudates  NECK: Supple; non tender. Full ROM.  CARD: RRR, no murmurs.  RESP: b/l crackles in lung bases, L>R  ABD: soft, non-tender, non-distended, no rebound or guarding.  MSK: No pedal edema, no calf tenderness  PSYCH: Cooperative, appropriate.

## 2021-12-24 NOTE — ED ADULT NURSE REASSESSMENT NOTE - NS ED NURSE REASSESS COMMENT FT1
Patient desaturating in mid 80s. Patient alert and oriented. ACP contacted and respiratory contacted. Patient denies chest pain, headache and dizziness.

## 2021-12-24 NOTE — PROGRESS NOTE ADULT - SUBJECTIVE AND OBJECTIVE BOX
RENNY PERSAUD Female  Patient is a 76y old  Female who presents with a chief complaint of + Acute hypoxic respiratory failure 2/2 COVID pneumonia (23 Dec 2021 20:08)      INTERVAL HPI/OVERNIGHT EVENTS:  HPI:  76 year old Female PMHx HTN, Asthma as a child (No hx of intubations/hospitalizations, Venous insufficiency, Lymphedema  Osteoarthritis presenting with fatigue, productive cough, and dyspnea. Patient report she was exposed to sick contact-  + COVID x 20 days ago. Patients  is currently admitted at [facility]. Patient states she did not have any symptoms however her sister who works as a nurse noted pt appeared dyspneic check Sp02 at home 66% She sent a rapid test today that came back + COVID. Was recommended her to go to the hospital. Pt reports she took Zpak on 12/17 as a preventative measure and completed course. Endorses some decreased appetite and SOB now improved at rest on HFNC. Patient is unvaccinated, denies having COVID in the past. Has hx of chronic LE swelling is on PO lasix.  Denies hx of PE/DVT, fever, body aches, headache, vision change, chest pain, abdominal pain, nausea, vomiting, melena, hematochezia, dysuria, urinary frequency, calf pain.     In ED vitals: Temp 98F HR 75 /83 SP02 initially 70s on NC currently 95% on 15L NRB - escalated to HFNC  Labs- Ddimer - 532 Procalcitonin - 0.08 Ferritin 503  Patient is s/p PO tylenol and IV Decadron 6mg x1.  (23 Dec 2021 20:08)      FAMILY HISTORY:  Family history of heart disease (Father)      Allergies    Bactrim (Unknown)  Betadine (Unknown)  latex (Unknown)  sulfa drugs (Unknown)    Intolerances      MEDICATIONS  (STANDING):  aspirin enteric coated 81 milliGRAM(s) Oral daily  chlorhexidine 2% Cloths 1 Application(s) Topical daily  dexAMETHasone  Injectable 6 milliGRAM(s) IV Push daily  enoxaparin Injectable 80 milliGRAM(s) SubCutaneous every 12 hours  furosemide    Tablet 40 milliGRAM(s) Oral daily  gabapentin 200 milliGRAM(s) Oral two times a day  potassium phosphate / sodium phosphate Powder (PHOS-NaK) 1 Packet(s) Oral once  remdesivir  IVPB 100 milliGRAM(s) IV Intermittent every 24 hours  remdesivir  IVPB   IV Intermittent   senna 2 Tablet(s) Oral at bedtime    MEDICATIONS  (PRN):  acetaminophen     Tablet .. 650 milliGRAM(s) Oral every 4 hours PRN Temp greater or equal to 38C (100.4F), Mild Pain (1 - 3), Moderate Pain (4 - 6)  ALBUTerol    90 MICROgram(s) HFA Inhaler 2 Puff(s) Inhalation every 4 hours PRN Shortness of Breath and/or Wheezing  benzonatate 200 milliGRAM(s) Oral three times a day PRN Cough  guaifenesin/dextromethorphan Oral Liquid 10 milliLiter(s) Oral every 4 hours PRN Cough    REVIEW OF SYSTEMS:  CONSTITUTIONAL: No fever, weight loss, or fatigue  RESPIRATORY: No cough, wheezing, chills or hemoptysis; No shortness of breath  CARDIOVASCULAR: No chest pain, palpitations, dizziness, or leg swelling  GASTROINTESTINAL: No abdominal or epigastric pain. No nausea, vomiting, or hematemesis; No diarrhea or constipation. No melena or hematochezia.  NEUROLOGICAL: No headaches, memory loss, loss of strength, numbness, or tremors  MUSCULOSKELETAL: No joint pain or swelling; No muscle, back, or extremity pain  SKIN: No rashes or lesions    Vital Signs Last 24 Hrs  T(C): 36.9 (24 Dec 2021 18:15), Max: 37 (24 Dec 2021 08:03)  T(F): 98.4 (24 Dec 2021 18:15), Max: 98.6 (24 Dec 2021 08:03)  HR: 76 (24 Dec 2021 19:04) (71 - 92)  BP: 145/70 (24 Dec 2021 18:15) (107/77 - 145/70)  BP(mean): 85 (24 Dec 2021 01:35) (85 - 85)  RR: 20 (24 Dec 2021 18:15) (20 - 25)  SpO2: 98% (24 Dec 2021 19:04) (92% - 98%)    T(C): 36.9 (12-24-21 @ 18:15), Max: 37 (12-24-21 @ 08:03)  HR: 76 (12-24-21 @ 19:04) (71 - 92)  BP: 145/70 (12-24-21 @ 18:15) (107/77 - 145/70)  RR: 20 (12-24-21 @ 18:15) (20 - 25)  SpO2: 98% (12-24-21 @ 19:04) (92% - 98%)    I&O's Summary    24 Dec 2021 07:01  -  24 Dec 2021 21:51  --------------------------------------------------------  IN: 625 mL / OUT: 1525 mL / NET: -900 mL    PHYSICAL EXAM:  GENERAL: NAD, well-groomed, well-developed  HEAD:  Atraumatic, Normocephalic  EYES: EOMI, PERRLA, conjunctiva and sclera clear  NECK: Supple, No JVD  NERVOUS SYSTEM:  Alert & Oriented X3, No gross focal deficits  CHEST/LUNG: Clear to percussion bilaterally; No rales, rhonchi, wheezing, or rubs  HEART: Regular rate and rhythm; S1S2 present  ABDOMEN: Soft, Nontender, Nondistended; Bowel sounds present  EXTREMITIES:  No clubbing, cyanosis, or edema  SKIN: No rashes or lesions

## 2021-12-24 NOTE — PROGRESS NOTE ADULT - ASSESSMENT
LABS:                        13.4   7.86  )-----------( 294      ( 23 Dec 2021 17:56 )             41.6   12-24    145  |  106  |  14  ----------------------------<  144<H>  4.0   |  23  |  0.41<L>    Ca    8.9      24 Dec 2021 07:27  Phos  2.4     12-24  Mg     1.90     12-24  CBC Full  -  ( 23 Dec 2021 17:56 )  WBC Count : 7.86 K/uL  RBC Count : 4.38 M/uL  Hemoglobin : 13.4 g/dL  Hematocrit : 41.6 %  Platelet Count - Automated : 294 K/uL  Mean Cell Volume : 95.0 fL  Mean Cell Hemoglobin : 30.6 pg  Mean Cell Hemoglobin Concentration : 32.2 gm/dL  Auto Neutrophil # : 5.58 K/uL  Auto Lymphocyte # : 1.32 K/uL  Auto Monocyte # : 0.68 K/uL  Auto Eosinophil # : 0.00 K/uL  Auto Basophil # : 0.03 K/uL  Auto Neutrophil % : 70.9 %  Auto Lymphocyte % : 16.8 %  Auto Monocyte % : 8.7 %  Auto Eosinophil % : 0.0 %  Auto Basophil % : 0.4 %

## 2021-12-24 NOTE — ED ADULT NURSE REASSESSMENT NOTE - NS ED NURSE REASSESS COMMENT FT1
Patient at baseline mental status. Patient denies chest pain headache or dizziness. Attempted to give report (second time) at 1227. No answer. Remdesivir administered as per MD order.

## 2021-12-24 NOTE — PATIENT PROFILE ADULT - FUNCTIONAL ASSESSMENT - BASIC MOBILITY 5.
Patient seen and examined with house-staff during bedside rounds.  Resident note read, including vitals, physical findings, laboratory data, and radiological reports.   Revisions included below.  Direct personal management at bed side and extensive interpretation of the data.  Plan was outlined and discussed in details with the housestaff.  Decision making of high complexity  Action taken for acute disease activity to reflect the level of care provided:  - medication reconciliation  - review laboratory data  Patient had a change in mental status. Heparin was discontinued. 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 I discussed the case with infectious disease. Neurology consult was called and discussed the case with the stroke team. His condition is deteriorating. Patient is to require pressors sedation and hemodialysis. I discussed the case with the parents and brother. The patient was made DNR with no escalation of therapy. 2 = A lot of assistance

## 2021-12-24 NOTE — PATIENT PROFILE ADULT - FALL HARM RISK - HARM RISK INTERVENTIONS

## 2021-12-25 NOTE — PROGRESS NOTE ADULT - SUBJECTIVE AND OBJECTIVE BOX
RENNY PERSAUD Female  Patient is a 76y old  Female who presents with a chief complaint of + Acute hypoxic respiratory failure 2/2 COVID pneumonia (24 Dec 2021 21:50)      INTERVAL HPI/OVERNIGHT EVENTS:  HPI:  76 year old Female PMHx HTN, Asthma as a child (No hx of intubations/hospitalizations, Venous insufficiency, Lymphedema  Osteoarthritis presenting with fatigue, productive cough, and dyspnea. Patient report she was exposed to sick contact-  + COVID x 20 days ago. Patients  is currently admitted at [facility]. Patient states she did not have any symptoms however her sister who works as a nurse noted pt appeared dyspneic check Sp02 at home 66% She sent a rapid test today that came back + COVID. Notified PCP - Dr. Cherise Amos who recommended her to go to the hospital. Pt reports she took Zpak on 12/17 as a preventative measure and completed course. Endorses some decreased appetite and SOB now improved at rest on HFNC. Patient is unvaccinated, denies having COVID in the past. Has hx of chronic LE swelling is on PO lasix.  Denies hx of PE/DVT, fever, body aches, headache, vision change, chest pain, abdominal pain, nausea, vomiting, melena, hematochezia, dysuria, urinary frequency, calf pain.     In ED vitals: Temp 98F HR 75 /83 SP02 initially 70s on NC currently 95% on 15L NRB - escalated to HFNC  Labs- Ddimer - 532 Procalcitonin - 0.08 Ferritin 503  Patient is s/p PO tylenol and IV Decadron 6mg x1.  (23 Dec 2021 20:08)      FAMILY HISTORY:  Family history of heart disease (Father)      Allergies    Bactrim (Unknown)  Betadine (Unknown)  latex (Unknown)  sulfa drugs (Unknown)    Intolerances      MEDICATIONS  (STANDING):  amLODIPine   Tablet 5 milliGRAM(s) Oral daily  aspirin enteric coated 81 milliGRAM(s) Oral daily  chlorhexidine 2% Cloths 1 Application(s) Topical daily  dexAMETHasone  Injectable 6 milliGRAM(s) IV Push daily  enoxaparin Injectable 80 milliGRAM(s) SubCutaneous every 12 hours  furosemide    Tablet 40 milliGRAM(s) Oral daily  gabapentin 200 milliGRAM(s) Oral two times a day  potassium phosphate / sodium phosphate Powder (PHOS-NaK) 1 Packet(s) Oral once  remdesivir  IVPB 100 milliGRAM(s) IV Intermittent every 24 hours  remdesivir  IVPB   IV Intermittent   senna 2 Tablet(s) Oral at bedtime    MEDICATIONS  (PRN):  acetaminophen     Tablet .. 650 milliGRAM(s) Oral every 4 hours PRN Temp greater or equal to 38C (100.4F), Mild Pain (1 - 3), Moderate Pain (4 - 6)  ALBUTerol    90 MICROgram(s) HFA Inhaler 2 Puff(s) Inhalation every 4 hours PRN Shortness of Breath and/or Wheezing  benzonatate 200 milliGRAM(s) Oral three times a day PRN Cough  guaifenesin/dextromethorphan Oral Liquid 10 milliLiter(s) Oral every 4 hours PRN Cough    REVIEW OF SYSTEMS:  CONSTITUTIONAL: No fever, weight loss, or fatigue  RESPIRATORY: No cough, wheezing, chills or hemoptysis; No shortness of breath  CARDIOVASCULAR: No chest pain, palpitations, dizziness, or leg swelling  GASTROINTESTINAL: No abdominal or epigastric pain. No nausea, vomiting, or hematemesis; No diarrhea or constipation. No melena or hematochezia.  NEUROLOGICAL: No headaches, memory loss, loss of strength, numbness, or tremors  MUSCULOSKELETAL: No joint pain or swelling; No muscle, back, or extremity pain  SKIN: No rashes or lesions    Vital Signs Last 24 Hrs  T(C): 36.4 (25 Dec 2021 13:00), Max: 36.7 (25 Dec 2021 06:34)  T(F): 97.5 (25 Dec 2021 13:00), Max: 98.1 (25 Dec 2021 06:34)  HR: 84 (25 Dec 2021 19:33) (62 - 90)  BP: 122/64 (25 Dec 2021 13:00) (121/68 - 122/64)  BP(mean): --  RR: 20 (25 Dec 2021 13:00) (20 - 20)  SpO2: 99% (25 Dec 2021 19:33) (98% - 99%)    T(C): 36.4 (12-25-21 @ 13:00), Max: 36.7 (12-25-21 @ 06:34)  HR: 84 (12-25-21 @ 19:33) (62 - 90)  BP: 122/64 (12-25-21 @ 13:00) (121/68 - 122/64)  RR: 20 (12-25-21 @ 13:00) (20 - 20)  SpO2: 99% (12-25-21 @ 19:33) (98% - 99%)    I&O's Summary    24 Dec 2021 07:01  -  25 Dec 2021 07:00  --------------------------------------------------------  IN: 625 mL / OUT: 1525 mL / NET: -900 mL      CBC Full  -  ( 25 Dec 2021 06:52 )  WBC Count : 8.60 K/uL  RBC Count : 3.99 M/uL  Hemoglobin : 12.3 g/dL  Hematocrit : 38.0 %  Platelet Count - Automated : 300 K/uL  Mean Cell Volume : 95.2 fL  Mean Cell Hemoglobin : 30.8 pg  Mean Cell Hemoglobin Concentration : 32.4 gm/dL  Auto Neutrophil # : x  Auto Lymphocyte # : x  Auto Monocyte # : x  Auto Eosinophil # : x  Auto Basophil # : x  Auto Neutrophil % : x  Auto Lymphocyte % : x  Auto Monocyte % : x  Auto Eosinophil % : x  Auto Basophil % : x    PHYSICAL EXAM:  GENERAL: NAD, well-groomed, well-developed  HEAD:  Atraumatic, Normocephalic  EYES: EOMI, PERRLA, conjunctiva and sclera clear  NECK: Supple, No JVD  NERVOUS SYSTEM:  Alert & Oriented X3, No gross focal deficits  CHEST/LUNG: Clear to percussion bilaterally; No rales, rhonchi, wheezing, or rubs  HEART: Regular rate and rhythm; S1S2 present  ABDOMEN: Soft, Nontender, Nondistended; Bowel sounds present  EXTREMITIES:  No clubbing, cyanosis, or edema  SKIN: No rashes or lesions    Assessment and Plan:   · Assessment	      LABS:                        12.3   8.60  )-----------( 300      ( 25 Dec 2021 06:52 )             38.0   12-25    146<H>  |  107  |  20  ----------------------------<  110<H>  3.9   |  27  |  0.47<L>    Ca    9.0      25 Dec 2021 06:52  Phos  2.5     12-25  Mg     2.10     12-25    TPro  6.2  /  Alb  3.4  /  TBili  0.3  /  DBili  x   /  AST  42<H>  /  ALT  20  /  AlkPhos  73  12-25        CAPILLARY BLOOD GLUCOSE      POCT Blood Glucose.: 134 mg/dL (25 Dec 2021 19:52)  POCT Blood Glucose.: 140 mg/dL (25 Dec 2021 14:02)      HEALTH ISSUES - PROBLEM Dx:  Acute respiratory failure with hypoxia    Pneumonia due to COVID-19 virus    Hypertension    Lower extremity edema    Need for prophylactic measure       COVID-19 Negative Lab Result:  · COVID-19 Negative Lab Result	COVID-19 ruled in despite negative lab finding     Problem/Plan - 1:  ·  Problem: Acute respiratory failure with hypoxia.   ·  Plan: HiFLO NC: 55L/min  ---> BIPAP: 13/8  FIO2: 100%. O2 Sat.: 98-99%     Problem/Plan - 2:  ·  Problem: Pneumonia due to COVID-19 virus.   ·  Plan: DEXAMETHASONE  REMDESIVIR  LOVENOX  ALBUTEROL.     Problem/Plan - 3:  ·  Problem: Hypertension.   ·  Plan: AMLODIPINE  LASIX  KCL.     Problem/Plan - 4:  ·  Problem: Lower extremity edema.   ·  Plan: LASIX  KCL.     Problem/Plan - 5:  ·  Problem: Need for prophylactic measure.   ·  Plan: As per orders.

## 2021-12-26 NOTE — PROGRESS NOTE ADULT - SUBJECTIVE AND OBJECTIVE BOX
RENNY PERSAUD Female  Patient is a 76y old  Female who presents with a chief complaint of + Acute hypoxic respiratory failure 2/2 COVID pneumonia (25 Dec 2021 22:41)      INTERVAL HPI/OVERNIGHT EVENTS:  Pt. switched back to AVAPS after couldn't tolerate [id number]  desats and sustained 78-80% on HFLO2  HPI:  76 year old Female PMHx HTN, Asthma as a child (No hx of intubations/hospitalizations, Venous insufficiency, Lymphedema  Osteoarthritis presenting with fatigue, productive cough, and dyspnea. Patient report she was exposed to sick contact-  + COVID x 20 days ago. Patients  is currently admitted at [facility]. Patient states she did not have any symptoms however her sister who works as a nurse noted pt appeared dyspneic check Sp02 at home 66% She sent a rapid test today that came back + COVID. Notified PCP - Dr. Cherise Amos who recommended her to go to the hospital. Pt reports she took Zpak on 12/17 as a preventative measure and completed course. Endorses some decreased appetite and SOB now improved at rest on HFNC. Patient is unvaccinated, denies having COVID in the past. Has hx of chronic LE swelling is on PO lasix.  Denies hx of PE/DVT, fever, body aches, headache, vision change, chest pain, abdominal pain, nausea, vomiting, melena, hematochezia, dysuria, urinary frequency, calf pain.     In ED vitals: Temp 98F HR 75 /83 SP02 initially 70s on NC currently 95% on 15L NRB - escalated to HFNC  Labs- Ddimer - 532 Procalcitonin - 0.08 Ferritin 503  Patient is s/p PO tylenol and IV Decadron 6mg x1.  (23 Dec 2021 20:08)      FAMILY HISTORY:  Family history of heart disease (Father)      Allergies    Bactrim (Unknown)  Betadine (Unknown)  latex (Unknown)  sulfa drugs (Unknown)    Intolerances      MEDICATIONS  (STANDING):  amLODIPine   Tablet 5 milliGRAM(s) Oral daily  aspirin enteric coated 81 milliGRAM(s) Oral daily  chlorhexidine 2% Cloths 1 Application(s) Topical daily  dexAMETHasone  Injectable 6 milliGRAM(s) IV Push daily  enoxaparin Injectable 80 milliGRAM(s) SubCutaneous every 12 hours  furosemide    Tablet 40 milliGRAM(s) Oral daily  gabapentin 200 milliGRAM(s) Oral two times a day  potassium phosphate / sodium phosphate Powder (PHOS-NaK) 1 Packet(s) Oral once  remdesivir  IVPB 100 milliGRAM(s) IV Intermittent every 24 hours  remdesivir  IVPB   IV Intermittent   senna 2 Tablet(s) Oral at bedtime    MEDICATIONS  (PRN):  acetaminophen     Tablet .. 650 milliGRAM(s) Oral every 4 hours PRN Temp greater or equal to 38C (100.4F), Mild Pain (1 - 3), Moderate Pain (4 - 6)  ALBUTerol    90 MICROgram(s) HFA Inhaler 2 Puff(s) Inhalation every 4 hours PRN Shortness of Breath and/or Wheezing  benzonatate 200 milliGRAM(s) Oral three times a day PRN Cough  guaifenesin/dextromethorphan Oral Liquid 10 milliLiter(s) Oral every 4 hours PRN Cough    REVIEW OF SYSTEMS:  CONSTITUTIONAL: No fever, weight loss, or fatigue  RESPIRATORY: No cough, wheezing, chills or hemoptysis; No shortness of breath  CARDIOVASCULAR: No chest pain, palpitations, dizziness, or leg swelling  GASTROINTESTINAL: No abdominal or epigastric pain. No nausea, vomiting, or hematemesis; No diarrhea or constipation. No melena or hematochezia.  NEUROLOGICAL: No headaches, memory loss, loss of strength, numbness, or tremors  MUSCULOSKELETAL: No joint pain or swelling; No muscle, back, or extremity pain  SKIN: No rashes or lesions    Vital Signs Last 24 Hrs  T(C): 37.3 (26 Dec 2021 20:35), Max: 37.3 (26 Dec 2021 20:35)  T(F): 99.2 (26 Dec 2021 20:35), Max: 99.2 (26 Dec 2021 20:35)  HR: 61 (26 Dec 2021 20:35) (55 - 86)  BP: 105/53 (26 Dec 2021 20:35) (105/53 - 131/79)  BP(mean): --  RR: 21 (26 Dec 2021 20:35) (21 - 25)  SpO2: 96% (26 Dec 2021 20:35) (90% - 99%)    T(C): 37.3 (12-26-21 @ 20:35), Max: 37.3 (12-26-21 @ 20:35)  HR: 61 (12-26-21 @ 20:35) (55 - 86)  BP: 105/53 (12-26-21 @ 20:35) (105/53 - 131/79)  RR: 21 (12-26-21 @ 20:35) (21 - 25)  SpO2: 96% (12-26-21 @ 20:35) (90% - 99%)  Daily     Daily   CBC Full  -  ( 26 Dec 2021 06:53 )  WBC Count : 13.84 K/uL  RBC Count : 3.97 M/uL  Hemoglobin : 12.3 g/dL  Hematocrit : 36.9 %  Platelet Count - Automated : 376 K/uL  Mean Cell Volume : 92.9 fL  Mean Cell Hemoglobin : 31.0 pg  Mean Cell Hemoglobin Concentration : 33.3 gm/dL  Auto Neutrophil # : x  Auto Lymphocyte # : x  Auto Monocyte # : x  Auto Eosinophil # : x  Auto Basophil # : x  Auto Neutrophil % : x  Auto Lymphocyte % : x  Auto Monocyte % : x  Auto Eosinophil % : x  Auto Basophil % : x        PHYSICAL EXAM:  GENERAL: NAD, well-groomed, well-developed  HEAD:  Atraumatic, Normocephalic  EYES: EOMI, PERRLA, conjunctiva and sclera clear  NECK: Supple, No JVD  NERVOUS SYSTEM:  Alert & Oriented X3, No gross focal deficits  CHEST/LUNG: Clear to percussion bilaterally; No rales, rhonchi, wheezing, or rubs  HEART: Regular rate and rhythm; S1S2 present  ABDOMEN: Soft, Nontender, Nondistended; Bowel sounds present  EXTREMITIES:  No clubbing, cyanosis, or edema  SKIN: No rashes or lesions    LABS:                        12.3   13.84 )-----------( 376      ( 26 Dec 2021 06:53 )             36.9   12-26    148<H>  |  108<H>  |  22  ----------------------------<  138<H>  3.6   |  27  |  0.45<L>    Ca    8.9      26 Dec 2021 06:53  Phos  2.5     12-25  Mg     2.10     12-25    TPro  5.9<L>  /  Alb  3.2<L>  /  TBili  0.3  /  DBili  x   /  AST  29  /  ALT  18  /  AlkPhos  69  12-26        CAPILLARY BLOOD GLUCOSE      POCT Blood Glucose.: 119 mg/dL (26 Dec 2021 22:18)  POCT Blood Glucose.: 128 mg/dL (26 Dec 2021 16:08)  POCT Blood Glucose.: 124 mg/dL (26 Dec 2021 09:13)  POCT Blood Glucose.: 109 mg/dL (26 Dec 2021 01:36)      HEALTH ISSUES - PROBLEM Dx:  Acute respiratory failure with hypoxia    Pneumonia due to COVID-19 virus    Hypertension    Lower extremity edema    Need for prophylactic measure    Problem/Plan - 1:  ·  Problem: Acute respiratory failure with hypoxia.   ·  Plan: AVAPS  EPAP: 8  FIO2: 100%. O2 Sat.: 96%  TV: 425/328     Problem/Plan - 2:  ·  Problem: Pneumonia due to COVID-19 virus.   ·  Plan: DEXAMETHASONE  REMDESIVIR  LOVENOX  ALBUTEROL.     Problem/Plan - 3:  ·  Problem: Hypertension.   ·  Plan: AMLODIPINE  LASIX  KCL.     Problem/Plan - 4:  ·  Problem: Lower extremity edema.   ·  Plan: LASIX  KCL.     Problem/Plan - 5:  ·  Problem: Need for prophylactic measure.   ·  Plan: As per orders.         Problem/Plan - 2:  ·  Problem: Pneumonia due to COVID-19 virus.   ·  Plan: DEXAMETHASONE  REMDESIVIR  LOVENOX  ALBUTEROL.     Problem/Plan - 3:  ·  Problem: Hypertension.   ·  Plan: AMLODIPINE  LASIX  KCL.     Problem/Plan - 4:  ·  Problem: Lower extremity edema.   ·  Plan: LASIX  KCL.     Problem/Plan - 5:  ·  Problem: Need for prophylactic measure.   ·  Plan: As per orders.           RENNY PERSAUD Female  Patient is a 76y old  Female who presents with a chief complaint of + Acute hypoxic respiratory failure 2/2 COVID pneumonia (25 Dec 2021 22:41)      INTERVAL HPI/OVERNIGHT EVENTS:  Pt. switched back to AVAPS after couldn't tolerate [id number]  desats and sustained 78-80% on HFLO2  HPI:  76 year old Female PMHx HTN, Asthma as a child (No hx of intubations/hospitalizations, Venous insufficiency, Lymphedema  Osteoarthritis presenting with fatigue, productive cough, and dyspnea. Patient report she was exposed to sick contact-  + COVID x 20 days ago. Patients  is currently admitted at [facility]. Patient states she did not have any symptoms however her sister who works as a nurse noted pt appeared dyspneic check Sp02 at home 66% She sent a rapid test today that came back + COVID. Notified PCP - Dr. Cherise Amos who recommended her to go to the hospital. Pt reports she took Zpak on 12/17 as a preventative measure and completed course. Endorses some decreased appetite and SOB now improved at rest on HFNC. Patient is unvaccinated, denies having COVID in the past. Has hx of chronic LE swelling is on PO lasix.  Denies hx of PE/DVT, fever, body aches, headache, vision change, chest pain, abdominal pain, nausea, vomiting, melena, hematochezia, dysuria, urinary frequency, calf pain.     In ED vitals: Temp 98F HR 75 /83 SP02 initially 70s on NC currently 95% on 15L NRB - escalated to HFNC  Labs- Ddimer - 532 Procalcitonin - 0.08 Ferritin 503  Patient is s/p PO tylenol and IV Decadron 6mg x1.  (23 Dec 2021 20:08)      FAMILY HISTORY:  Family history of heart disease (Father)      Allergies    Bactrim (Unknown)  Betadine (Unknown)  latex (Unknown)  sulfa drugs (Unknown)    Intolerances      MEDICATIONS  (STANDING):  amLODIPine   Tablet 5 milliGRAM(s) Oral daily  aspirin enteric coated 81 milliGRAM(s) Oral daily  chlorhexidine 2% Cloths 1 Application(s) Topical daily  dexAMETHasone  Injectable 6 milliGRAM(s) IV Push daily  enoxaparin Injectable 80 milliGRAM(s) SubCutaneous every 12 hours  furosemide    Tablet 40 milliGRAM(s) Oral daily  gabapentin 200 milliGRAM(s) Oral two times a day  potassium phosphate / sodium phosphate Powder (PHOS-NaK) 1 Packet(s) Oral once  remdesivir  IVPB 100 milliGRAM(s) IV Intermittent every 24 hours  remdesivir  IVPB   IV Intermittent   senna 2 Tablet(s) Oral at bedtime    MEDICATIONS  (PRN):  acetaminophen     Tablet .. 650 milliGRAM(s) Oral every 4 hours PRN Temp greater or equal to 38C (100.4F), Mild Pain (1 - 3), Moderate Pain (4 - 6)  ALBUTerol    90 MICROgram(s) HFA Inhaler 2 Puff(s) Inhalation every 4 hours PRN Shortness of Breath and/or Wheezing  benzonatate 200 milliGRAM(s) Oral three times a day PRN Cough  guaifenesin/dextromethorphan Oral Liquid 10 milliLiter(s) Oral every 4 hours PRN Cough    REVIEW OF SYSTEMS:  CONSTITUTIONAL: No fever, weight loss, or fatigue  RESPIRATORY: No cough, wheezing, chills or hemoptysis; No shortness of breath  CARDIOVASCULAR: No chest pain, palpitations, dizziness, or leg swelling  GASTROINTESTINAL: No abdominal or epigastric pain. No nausea, vomiting, or hematemesis; No diarrhea or constipation. No melena or hematochezia.  NEUROLOGICAL: No headaches, memory loss, loss of strength, numbness, or tremors  MUSCULOSKELETAL: No joint pain or swelling; No muscle, back, or extremity pain  SKIN: No rashes or lesions    Vital Signs Last 24 Hrs  T(C): 37.3 (26 Dec 2021 20:35), Max: 37.3 (26 Dec 2021 20:35)  T(F): 99.2 (26 Dec 2021 20:35), Max: 99.2 (26 Dec 2021 20:35)  HR: 61 (26 Dec 2021 20:35) (55 - 86)  BP: 105/53 (26 Dec 2021 20:35) (105/53 - 131/79)  BP(mean): --  RR: 21 (26 Dec 2021 20:35) (21 - 25)  SpO2: 96% (26 Dec 2021 20:35) (90% - 99%)    T(C): 37.3 (12-26-21 @ 20:35), Max: 37.3 (12-26-21 @ 20:35)  HR: 61 (12-26-21 @ 20:35) (55 - 86)  BP: 105/53 (12-26-21 @ 20:35) (105/53 - 131/79)  RR: 21 (12-26-21 @ 20:35) (21 - 25)  SpO2: 96% (12-26-21 @ 20:35) (90% - 99%)  Daily     Daily   CBC Full  -  ( 26 Dec 2021 06:53 )  WBC Count : 13.84 K/uL  RBC Count : 3.97 M/uL  Hemoglobin : 12.3 g/dL  Hematocrit : 36.9 %  Platelet Count - Automated : 376 K/uL  Mean Cell Volume : 92.9 fL  Mean Cell Hemoglobin : 31.0 pg  Mean Cell Hemoglobin Concentration : 33.3 gm/dL  Auto Neutrophil # : x  Auto Lymphocyte # : x  Auto Monocyte # : x  Auto Eosinophil # : x  Auto Basophil # : x  Auto Neutrophil % : x  Auto Lymphocyte % : x  Auto Monocyte % : x  Auto Eosinophil % : x  Auto Basophil % : x        PHYSICAL EXAM:  GENERAL: NAD, well-groomed, well-developed  HEAD:  Atraumatic, Normocephalic  EYES: EOMI, PERRLA, conjunctiva and sclera clear  NECK: Supple, No JVD  NERVOUS SYSTEM:  Alert & Oriented X3, No gross focal deficits  CHEST/LUNG: Clear to percussion bilaterally; No rales, rhonchi, wheezing, or rubs  HEART: Regular rate and rhythm; S1S2 present  ABDOMEN: Soft, Nontender, Nondistended; Bowel sounds present  EXTREMITIES:  No clubbing, cyanosis, or edema  SKIN: No rashes or lesions    LABS:                        12.3   13.84 )-----------( 376      ( 26 Dec 2021 06:53 )             36.9   12-26    148<H>  |  108<H>  |  22  ----------------------------<  138<H>  3.6   |  27  |  0.45<L>    Ca    8.9      26 Dec 2021 06:53  Phos  2.5     12-25  Mg     2.10     12-25    TPro  5.9<L>  /  Alb  3.2<L>  /  TBili  0.3  /  DBili  x   /  AST  29  /  ALT  18  /  AlkPhos  69  12-26        CAPILLARY BLOOD GLUCOSE      POCT Blood Glucose.: 119 mg/dL (26 Dec 2021 22:18)  POCT Blood Glucose.: 128 mg/dL (26 Dec 2021 16:08)  POCT Blood Glucose.: 124 mg/dL (26 Dec 2021 09:13)  POCT Blood Glucose.: 109 mg/dL (26 Dec 2021 01:36)      HEALTH ISSUES - PROBLEM Dx:  Acute respiratory failure with hypoxia    Pneumonia due to COVID-19 virus    Hypertension    Lower extremity edema    Need for prophylactic measure    Problem/Plan - 1:  ·  Problem: Acute respiratory failure with hypoxia.   ·  Plan: AVAPS  EPAP: 8  FIO2: 100%. O2 Sat.: 96%  TV: 425/328     Problem/Plan - 2:  ·  Problem: Pneumonia due to COVID-19 virus.   ·  Plan: DEXAMETHASONE  REMDESIVIR  LOVENOX  ALBUTEROL.     Problem/Plan - 3:  ·  Problem: Hypertension.   ·  Plan: AMLODIPINE  LASIX  KCL.     Problem/Plan - 4:  ·  Problem: Lower extremity edema.   ·  Plan: LASIX  KCL.     Problem/Plan - 5:  ·  Problem: Need for prophylactic measure.   ·  Plan: As per orders.

## 2021-12-27 NOTE — PROGRESS NOTE ADULT - SUBJECTIVE AND OBJECTIVE BOX
RENNY PERSAUD Female  Patient is a 76y old  Female who presents with a chief complaint of + Acute hypoxic respiratory failure 2/2 COVID pneumonia (26 Dec 2021 22:24)    INTERVAL HPI/OVERNIGHT EVENTS:  Pt. comfortable on  VAPS   13/8  FIO2: 80%  O2 Sat: 96%      HPI:  76 year old Female PMHx HTN, Asthma as a child (No hx of intubations/hospitalizations, Venous insufficiency, Lymphedema  Osteoarthritis presenting with fatigue, productive cough, and dyspnea. Patient report she was exposed to sick contact-  + COVID x 20 days ago. Patients  is currently admitted at [facility]. Patient states she did not have any symptoms however her sister who works as a nurse noted pt appeared dyspneic check Sp02 at home 66% She sent a rapid test today that came back + COVID. Notified PCP - Dr. Cherise Amos who recommended her to go to the hospital. Pt reports she took Zpak on 12/17 as a preventative measure and completed course. Endorses some decreased appetite and SOB now improved at rest on HFNC. Patient is unvaccinated, denies having COVID in the past. Has hx of chronic LE swelling is on PO lasix.  Denies hx of PE/DVT, fever, body aches, headache, vision change, chest pain, abdominal pain, nausea, vomiting, melena, hematochezia, dysuria, urinary frequency, calf pain.     In ED vitals: Temp 98F HR 75 /83 SP02 initially 70s on NC currently 95% on 15L NRB - escalated to HFNC  Labs- Ddimer - 532 Procalcitonin - 0.08 Ferritin 503  Patient is s/p PO tylenol and IV Decadron 6mg x1.  (23 Dec 2021 20:08)      FAMILY HISTORY:  Family history of heart disease (Father)      Allergies    Bactrim (Unknown)  Betadine (Unknown)  latex (Unknown)  sulfa drugs (Unknown)    Intolerances      MEDICATIONS  (STANDING):  amLODIPine   Tablet 5 milliGRAM(s) Oral daily  aspirin enteric coated 81 milliGRAM(s) Oral daily  chlorhexidine 2% Cloths 1 Application(s) Topical daily  dexAMETHasone  Injectable 6 milliGRAM(s) IV Push daily  enoxaparin Injectable 80 milliGRAM(s) SubCutaneous every 12 hours  furosemide    Tablet 40 milliGRAM(s) Oral daily  gabapentin 200 milliGRAM(s) Oral two times a day  potassium phosphate / sodium phosphate Powder (PHOS-NaK) 1 Packet(s) Oral once  remdesivir  IVPB 100 milliGRAM(s) IV Intermittent every 24 hours  senna 2 Tablet(s) Oral at bedtime    MEDICATIONS  (PRN):  acetaminophen     Tablet .. 650 milliGRAM(s) Oral every 4 hours PRN Temp greater or equal to 38C (100.4F), Mild Pain (1 - 3), Moderate Pain (4 - 6)  ALBUTerol    90 MICROgram(s) HFA Inhaler 2 Puff(s) Inhalation every 4 hours PRN Shortness of Breath and/or Wheezing  benzonatate 200 milliGRAM(s) Oral three times a day PRN Cough  guaifenesin/dextromethorphan Oral Liquid 10 milliLiter(s) Oral every 4 hours PRN Cough    REVIEW OF SYSTEMS:  CONSTITUTIONAL: No fever, weight loss, or fatigue  RESPIRATORY: No cough, wheezing, chills or hemoptysis; No shortness of breath  CARDIOVASCULAR: No chest pain, palpitations, dizziness, or leg swelling  GASTROINTESTINAL: No abdominal or epigastric pain. No nausea, vomiting, or hematemesis; No diarrhea or constipation. No melena or hematochezia.  NEUROLOGICAL: No headaches, memory loss, loss of strength, numbness, or tremors  MUSCULOSKELETAL: No joint pain or swelling; No muscle, back, or extremity pain  SKIN: No rashes or lesions    Vital Signs Last 24 Hrs  T(C): 36.4 (27 Dec 2021 21:30), Max: 36.7 (27 Dec 2021 05:20)  T(F): 97.6 (27 Dec 2021 21:30), Max: 98.1 (27 Dec 2021 05:20)  HR: 59 (27 Dec 2021 21:30) (54 - 64)  BP: 129/68 (27 Dec 2021 21:30) (103/57 - 129/68)  BP(mean): --  RR: 18 (27 Dec 2021 21:30) (18 - 22)  SpO2: 96% (27 Dec 2021 21:30) (93% - 98%)    T(C): 36.4 (12-27-21 @ 21:30), Max: 36.7 (12-27-21 @ 05:20)  HR: 59 (12-27-21 @ 21:30) (54 - 64)  BP: 129/68 (12-27-21 @ 21:30) (103/57 - 129/68)  RR: 18 (12-27-21 @ 21:30) (18 - 22)  SpO2: 96% (12-27-21 @ 21:30) (93% - 98%)  Daily     Daily   CBC Full  -  ( 27 Dec 2021 06:23 )  WBC Count : 11.79 K/uL  RBC Count : 4.10 M/uL  Hemoglobin : 12.8 g/dL  Hematocrit : 38.2 %  Platelet Count - Automated : 434 K/uL  Mean Cell Volume : 93.2 fL  Mean Cell Hemoglobin : 31.2 pg  Mean Cell Hemoglobin Concentration : 33.5 gm/dL  Auto Neutrophil # : x  Auto Lymphocyte # : x  Auto Monocyte # : x  Auto Eosinophil # : x  Auto Basophil # : x  Auto Neutrophil % : x  Auto Lymphocyte % : x  Auto Monocyte % : x  Auto Eosinophil % : x  Auto Basophil % : x        PHYSICAL EXAM:  GENERAL: NAD, well-groomed, well-developed  HEAD:  Atraumatic, Normocephalic  EYES: EOMI, PERRLA, conjunctiva and sclera clear  NECK: Supple, No JVD  NERVOUS SYSTEM:  Alert & Oriented X3, No gross focal deficits  CHEST/LUNG: Clear to percussion bilaterally; No rales, rhonchi, wheezing, or rubs  HEART: Regular rate and rhythm; S1S2 present  ABDOMEN: Soft, Nontender, Nondistended; Bowel sounds present  EXTREMITIES:  No clubbing, cyanosis, or edema  SKIN: No rashes or lesions    LABS:                        12.8   11.79 )-----------( 434      ( 27 Dec 2021 06:23 )             38.2   12-27    149<H>  |  106  |  23  ----------------------------<  102<H>  3.5   |  29  |  0.45<L>    Ca    9.2      27 Dec 2021 06:23    TPro  6.2  /  Alb  3.3  /  TBili  0.4  /  DBili  x   /  AST  23  /  ALT  16  /  AlkPhos  73  12-27        CAPILLARY BLOOD GLUCOSE      POCT Blood Glucose.: 124 mg/dL (27 Dec 2021 17:21)  POCT Blood Glucose.: 145 mg/dL (27 Dec 2021 12:58)  POCT Blood Glucose.: 138 mg/dL (27 Dec 2021 08:41)      HEALTH ISSUES - PROBLEM Dx:  Acute respiratory failure with hypoxia    Pneumonia due to COVID-19 virus    Hypertension    Lower extremity edema    Need for prophylactic measure    Problem/Plan - 1:  ·  Problem: Acute respiratory failure with hypoxia.   ·  Plan: VAPS  EPAP: 8  FIO2: 100%. O2 Sat.: 96%  APRV Biphasic mode: 26/13  TV: 425/686     Problem/Plan - 2:  ·  Problem: Pneumonia due to COVID-19 virus.   ·  Plan: DEXAMETHASONE  REMDESIVIR  LOVENOX  ALBUTEROL.     Problem/Plan - 3:  ·  Problem: Hypertension.   ·  Plan: AMLODIPINE  LASIX  KCL.     Problem/Plan - 4:  ·  Problem: Lower extremity edema.   ·  Plan: LASIX  KCL.     Problem/Plan - 5:  ·  Problem: Need for prophylactic measure.   ·  Plan: As per orders.

## 2021-12-28 NOTE — PROGRESS NOTE ADULT - SUBJECTIVE AND OBJECTIVE BOX
RENNY PERSAUD Female  Patient is a 76y old  Female who presents with a chief complaint of + Acute hypoxic respiratory failure 2/2 COVID pneumonia (27 Dec 2021 22:55)      INTERVAL HPI/OVERNIGHT EVENTS:  HPI:  76 year old Female PMHx HTN, Asthma as a child (No hx of intubations/hospitalizations, Venous insufficiency, Lymphedema  Osteoarthritis presenting with fatigue, productive cough, and dyspnea. Patient report she was exposed to sick contact-  + COVID x 20 days ago. Patients  is currently admitted at [facility]. Patient states she did not have any symptoms however her sister who works as a nurse noted pt appeared dyspneic check Sp02 at home 66% She sent a rapid test today that came back + COVID. Notified PCP - Dr. Cherise Amos who recommended her to go to the hospital. Pt reports she took Zpak on 12/17 as a preventative measure and completed course. Endorses some decreased appetite and SOB now improved at rest on HFNC. Patient is unvaccinated, denies having COVID in the past. Has hx of chronic LE swelling is on PO lasix.  Denies hx of PE/DVT, fever, body aches, headache, vision change, chest pain, abdominal pain, nausea, vomiting, melena, hematochezia, dysuria, urinary frequency, calf pain.     In ED vitals: Temp 98F HR 75 /83 SP02 initially 70s on NC currently 95% on 15L NRB - escalated to HFNC  Labs- Ddimer - 532 Procalcitonin - 0.08 Ferritin 503  Patient is s/p PO tylenol and IV Decadron 6mg x1.  (23 Dec 2021 20:08)      FAMILY HISTORY:  Family history of heart disease (Father)      Allergies    Bactrim (Unknown)  Betadine (Unknown)  latex (Unknown)  sulfa drugs (Unknown)    Intolerances      MEDICATIONS  (STANDING):  amLODIPine   Tablet 5 milliGRAM(s) Oral daily  aspirin enteric coated 81 milliGRAM(s) Oral daily  chlorhexidine 2% Cloths 1 Application(s) Topical daily  dexAMETHasone  Injectable 6 milliGRAM(s) IV Push daily  dextrose 5%. 1000 milliLiter(s) (50 mL/Hr) IV Continuous <Continuous>  enoxaparin Injectable 80 milliGRAM(s) SubCutaneous every 12 hours  furosemide    Tablet 40 milliGRAM(s) Oral daily  gabapentin 200 milliGRAM(s) Oral two times a day  potassium phosphate / sodium phosphate Powder (PHOS-NaK) 1 Packet(s) Oral once  remdesivir  IVPB 100 milliGRAM(s) IV Intermittent every 24 hours  senna 2 Tablet(s) Oral at bedtime    MEDICATIONS  (PRN):  acetaminophen     Tablet .. 650 milliGRAM(s) Oral every 4 hours PRN Temp greater or equal to 38C (100.4F), Mild Pain (1 - 3), Moderate Pain (4 - 6)  ALBUTerol    90 MICROgram(s) HFA Inhaler 2 Puff(s) Inhalation every 4 hours PRN Shortness of Breath and/or Wheezing  benzonatate 200 milliGRAM(s) Oral three times a day PRN Cough  guaifenesin/dextromethorphan Oral Liquid 10 milliLiter(s) Oral every 4 hours PRN Cough    REVIEW OF SYSTEMS:  CONSTITUTIONAL: No fever, weight loss, or fatigue  RESPIRATORY: No cough, wheezing, chills or hemoptysis; No shortness of breath  CARDIOVASCULAR: No chest pain, palpitations, dizziness, or leg swelling  GASTROINTESTINAL: No abdominal or epigastric pain. No nausea, vomiting, or hematemesis; No diarrhea or constipation. No melena or hematochezia.  NEUROLOGICAL: No headaches, memory loss, loss of strength, numbness, or tremors  MUSCULOSKELETAL: No joint pain or swelling; No muscle, back, or extremity pain  SKIN: No rashes or lesions    Vital Signs Last 24 Hrs  T(C): 36.7 (28 Dec 2021 22:27), Max: 36.9 (28 Dec 2021 05:20)  T(F): 98.1 (28 Dec 2021 22:27), Max: 98.4 (28 Dec 2021 05:20)  HR: 56 (28 Dec 2021 22:27) (56 - 67)  BP: 116/61 (28 Dec 2021 22:27) (113/62 - 129/64)  BP(mean): --  RR: 18 (28 Dec 2021 22:27) (17 - 18)  SpO2: 96% (28 Dec 2021 22:27) (90% - 96%)    T(C): 36.7 (12-28-21 @ 22:27), Max: 36.9 (12-28-21 @ 05:20)  HR: 56 (12-28-21 @ 22:27) (56 - 67)  BP: 116/61 (12-28-21 @ 22:27) (113/62 - 129/64)  RR: 18 (12-28-21 @ 22:27) (17 - 18)  SpO2: 96% (12-28-21 @ 22:27) (90% - 96%)  Daily     Daily   CBC Full  -  ( 28 Dec 2021 07:58 )  WBC Count : 13.81 K/uL  RBC Count : 3.94 M/uL  Hemoglobin : 12.2 g/dL  Hematocrit : 38.0 %  Platelet Count - Automated : 448 K/uL  Mean Cell Volume : 96.4 fL  Mean Cell Hemoglobin : 31.0 pg  Mean Cell Hemoglobin Concentration : 32.1 gm/dL  Auto Neutrophil # : x  Auto Lymphocyte # : x  Auto Monocyte # : x  Auto Eosinophil # : x  Auto Basophil # : x  Auto Neutrophil % : x  Auto Lymphocyte % : x  Auto Monocyte % : x  Auto Eosinophil % : x  Auto Basophil % : x        PHYSICAL EXAM:  GENERAL: NAD, well-groomed, well-developed  HEAD:  Atraumatic, Normocephalic  EYES: EOMI, PERRLA, conjunctiva and sclera clear  NECK: Supple, No JVD  NERVOUS SYSTEM:  Alert & Oriented X3, No gross focal deficits  CHEST/LUNG: Clear to percussion bilaterally; Scattered dry crackles,  No rales, rhonchi, wheezing, or rubs  HEART: Regular rate and rhythm; S1S2 present  ABDOMEN: Soft, Nontender, Nondistended; Bowel sounds present  EXTREMITIES:  No clubbing, cyanosis, or edema  SKIN: No rashes or lesions    LABS:                        12.2   13.81 )-----------( 448      ( 28 Dec 2021 07:58 )             38.0   12-28    146<H>  |  104  |  23  ----------------------------<  124<H>  3.4<L>   |  31  |  0.42<L>    Ca    8.9      28 Dec 2021 07:58  Phos  2.1     12-28  Mg     2.10     12-28    TPro  6.2  /  Alb  3.3  /  TBili  0.4  /  DBili  x   /  AST  23  /  ALT  16  /  AlkPhos  73  12-27        CAPILLARY BLOOD GLUCOSE      POCT Blood Glucose.: 131 mg/dL (28 Dec 2021 18:52)  POCT Blood Glucose.: 177 mg/dL (28 Dec 2021 13:13)  POCT Blood Glucose.: 137 mg/dL (28 Dec 2021 06:54)  POCT Blood Glucose.: 89 mg/dL (27 Dec 2021 23:03)      HEALTH ISSUES - PROBLEM Dx:  Acute respiratory failure with hypoxia    Pneumonia due to COVID-19 virus    Hypertension    Lower extremity edema    Need for prophylactic measure            I&O's Summary    28 Dec 2021 07:01  -  28 Dec 2021 22:35  --------------------------------------------------------  IN: 0 mL / OUT: 1100 mL / NET: -1100 mL    Problem/Plan - 1:  ·  Problem: Acute respiratory failure with hypoxia.   ·  Plan: VAPS  EPAP: 8  FIO2: 80%. O2 Sat.: 96%  APRV Biphasic mode: 25/12  TV: 425/450     Problem/Plan - 2:  ·  Problem: Pneumonia due to COVID-19 virus.   ·  Plan: DEXAMETHASONE  REMDESIVIR  LOVENOX  ALBUTEROL.     Problem/Plan - 3:  ·  Problem: Hypertension.   ·  Plan: AMLODIPINE  LASIX  KCL.     Problem/Plan - 4:  ·  Problem: Lower extremity edema.   ·  Plan: LASIX  KCL.     Problem/Plan - 5:  ·  Problem: Need for prophylactic measure.   ·  Plan: As per orders.

## 2021-12-29 NOTE — PROGRESS NOTE ADULT - SUBJECTIVE AND OBJECTIVE BOX
RENNY PERSAUD Female  Patient is a 76y old  Female who presents with a chief complaint of + Acute hypoxic respiratory failure 2/2 COVID pneumonia (28 Dec 2021 22:34)      INTERVAL HPI/OVERNIGHT EVENTS:  HPI:  76 year old Female PMHx HTN, Asthma as a child (No hx of intubations/hospitalizations, Venous insufficiency, Lymphedema  Osteoarthritis presenting with fatigue, productive cough, and dyspnea. Patient report she was exposed to sick contact-  + COVID x 20 days ago. Patients  is currently admitted at [facility]. Patient states she did not have any symptoms however her sister who works as a nurse noted pt appeared dyspneic check Sp02 at home 66% She sent a rapid test today that came back + COVID. Notified PCP - Dr. Cherise Amos who recommended her to go to the hospital. Pt reports she took Zpak on 12/17 as a preventative measure and completed course. Endorses some decreased appetite and SOB now improved at rest on HFNC. Patient is unvaccinated, denies having COVID in the past. Has hx of chronic LE swelling is on PO lasix.  Denies hx of PE/DVT, fever, body aches, headache, vision change, chest pain, abdominal pain, nausea, vomiting, melena, hematochezia, dysuria, urinary frequency, calf pain.     In ED vitals: Temp 98F HR 75 /83 SP02 initially 70s on NC currently 95% on 15L NRB - escalated to HFNC  Labs- Ddimer - 532 Procalcitonin - 0.08 Ferritin 503  Patient is s/p PO tylenol and IV Decadron 6mg x1.  (23 Dec 2021 20:08)      FAMILY HISTORY:  Family history of heart disease (Father)      Allergies    Bactrim (Unknown)  Betadine (Unknown)  latex (Unknown)  sulfa drugs (Unknown)    Intolerances      MEDICATIONS  (STANDING):  amLODIPine   Tablet 5 milliGRAM(s) Oral daily  aspirin enteric coated 81 milliGRAM(s) Oral daily  chlorhexidine 2% Cloths 1 Application(s) Topical daily  dexAMETHasone  Injectable 6 milliGRAM(s) IV Push daily  dextrose 5%. 1000 milliLiter(s) (50 mL/Hr) IV Continuous <Continuous>  enoxaparin Injectable 80 milliGRAM(s) SubCutaneous every 12 hours  furosemide    Tablet 40 milliGRAM(s) Oral daily  gabapentin 200 milliGRAM(s) Oral two times a day  remdesivir  IVPB 100 milliGRAM(s) IV Intermittent every 24 hours  senna 2 Tablet(s) Oral at bedtime    MEDICATIONS  (PRN):  acetaminophen     Tablet .. 650 milliGRAM(s) Oral every 4 hours PRN Temp greater or equal to 38C (100.4F), Mild Pain (1 - 3), Moderate Pain (4 - 6)  ALBUTerol    90 MICROgram(s) HFA Inhaler 2 Puff(s) Inhalation every 4 hours PRN Shortness of Breath and/or Wheezing  benzonatate 200 milliGRAM(s) Oral three times a day PRN Cough  guaifenesin/dextromethorphan Oral Liquid 10 milliLiter(s) Oral every 4 hours PRN Cough    REVIEW OF SYSTEMS:  CONSTITUTIONAL: No fever, weight loss, or fatigue  RESPIRATORY: No cough, wheezing, chills or hemoptysis; No shortness of breath  CARDIOVASCULAR: No chest pain, palpitations, dizziness, or leg swelling  GASTROINTESTINAL: No abdominal or epigastric pain. No nausea, vomiting, or hematemesis; No diarrhea or constipation. No melena or hematochezia.  NEUROLOGICAL: No headaches, memory loss, loss of strength, numbness, or tremors  MUSCULOSKELETAL: No joint pain or swelling; No muscle, back, or extremity pain  SKIN: No rashes or lesions    Vital Signs Last 24 Hrs  T(C): 36.5 (29 Dec 2021 18:07), Max: 36.8 (29 Dec 2021 12:18)  T(F): 97.7 (29 Dec 2021 18:07), Max: 98.2 (29 Dec 2021 12:18)  HR: 62 (29 Dec 2021 18:07) (60 - 72)  BP: 108/61 (29 Dec 2021 18:07) (108/61 - 123/69)  BP(mean): --  RR: 18 (29 Dec 2021 18:07) (18 - 18)  SpO2: 97% (29 Dec 2021 18:07) (92% - 97%)    T(C): 36.5 (12-29-21 @ 18:07), Max: 36.8 (12-29-21 @ 12:18)  HR: 62 (12-29-21 @ 18:07) (60 - 72)  BP: 108/61 (12-29-21 @ 18:07) (108/61 - 123/69)  RR: 18 (12-29-21 @ 18:07) (18 - 18)  SpO2: 97% (12-29-21 @ 18:07) (92% - 97%)  Daily     Daily   CBC Full  -  ( 29 Dec 2021 08:27 )  WBC Count : 14.14 K/uL  RBC Count : 4.18 M/uL  Hemoglobin : 12.7 g/dL  Hematocrit : 40.0 %  Platelet Count - Automated : 479 K/uL  Mean Cell Volume : 95.7 fL  Mean Cell Hemoglobin : 30.4 pg  Mean Cell Hemoglobin Concentration : 31.8 gm/dL  Auto Neutrophil # : x  Auto Lymphocyte # : x  Auto Monocyte # : x  Auto Eosinophil # : x  Auto Basophil # : x  Auto Neutrophil % : x  Auto Lymphocyte % : x  Auto Monocyte % : x  Auto Eosinophil % : x  Auto Basophil % : x        PHYSICAL EXAM:  GENERAL: NAD, well-groomed, well-developed  HEAD:  Atraumatic, Normocephalic  EYES: EOMI, PERRLA, conjunctiva and sclera clear  NECK: Supple, No JVD  NERVOUS SYSTEM:  Alert & Oriented X3, No gross focal deficits  CHEST/LUNG: Clear to percussion bilaterally; No rales, rhonchi, wheezing, or rubs  HEART: Regular rate and rhythm; S1S2 present  ABDOMEN: Soft, Nontender, Nondistended; Bowel sounds present  EXTREMITIES:  No clubbing, cyanosis, or edema  SKIN: No rashes or lesions    LABS:                        12.7   14.14 )-----------( 479      ( 29 Dec 2021 08:27 )             40.0   12-29    143  |  101  |  20  ----------------------------<  146<H>  3.7   |  31  |  0.38<L>    Ca    8.8      29 Dec 2021 11:58  Phos  2.3     12-29  Mg     2.00     12-29          CAPILLARY BLOOD GLUCOSE      POCT Blood Glucose.: 96 mg/dL (29 Dec 2021 20:54)  POCT Blood Glucose.: 119 mg/dL (29 Dec 2021 17:54)  POCT Blood Glucose.: 153 mg/dL (29 Dec 2021 12:59)  POCT Blood Glucose.: 105 mg/dL (29 Dec 2021 09:04)  POCT Blood Glucose.: 111 mg/dL (29 Dec 2021 00:47)      HEALTH ISSUES - PROBLEM Dx:  Acute respiratory failure with hypoxia    Pneumonia due to COVID-19 virus    Hypertension    Lower extremity edema    Need for prophylactic measure            I&O's Summary    28 Dec 2021 07:01  -  29 Dec 2021 07:00  --------------------------------------------------------  IN: 0 mL / OUT: 1100 mL / NET: -1100 mL    29 Dec 2021 07:01  -  29 Dec 2021 22:39  --------------------------------------------------------  IN: 0 mL / OUT: 1100 mL / NET: -1100 mL      Problem/Plan - 1:  ·  Problem: Acute respiratory failure with hypoxia.   ·  Plan: VAPS  EPAP: 9  FIO2: 100%. O2 Sat.: 95%  APRV Biphasic mode: 25/13  TV: 425/338     Problem/Plan - 2:  ·  Problem: Pneumonia due to COVID-19 virus.   ·  Plan: DEXAMETHASONE  REMDESIVIR  LOVENOX  ALBUTEROL.     Problem/Plan - 3:  ·  Problem: Hypertension.   ·  Plan: AMLODIPINE  LASIX  KCL.     Problem/Plan - 4:  ·  Problem: Lower extremity edema.   ·  Plan: LASIX  KCL.     Problem/Plan - 5:  ·  Problem: Need for prophylactic measure.   ·  Plan: As per orders.

## 2021-12-30 NOTE — PROGRESS NOTE ADULT - SUBJECTIVE AND OBJECTIVE BOX
RENNY PERSAUD Female  Patient is a 76y old  Female who presents with a chief complaint of + Acute hypoxic respiratory failure 2/2 COVID pneumonia (29 Dec 2021 22:37)      INTERVAL HPI/OVERNIGHT EVENTS:  HPI:  76 year old Female PMHx HTN, Asthma as a child (No hx of intubations/hospitalizations, Venous insufficiency, Lymphedema  Osteoarthritis presenting with fatigue, productive cough, and dyspnea. Patient report she was exposed to sick contact-  + COVID x 20 days ago. Patients  is currently admitted at [facility]. Patient states she did not have any symptoms however her sister who works as a nurse noted pt appeared dyspneic check Sp02 at home 66% She sent a rapid test today that came back + COVID. Notified PCP - Dr. Cherise Amos who recommended her to go to the hospital. Pt reports she took Zpak on 12/17 as a preventative measure and completed course. Endorses some decreased appetite and SOB now improved at rest on HFNC. Patient is unvaccinated, denies having COVID in the past. Has hx of chronic LE swelling is on PO lasix.  Denies hx of PE/DVT, fever, body aches, headache, vision change, chest pain, abdominal pain, nausea, vomiting, melena, hematochezia, dysuria, urinary frequency, calf pain.     In ED vitals: Temp 98F HR 75 /83 SP02 initially 70s on NC currently 95% on 15L NRB - escalated to HFNC  Labs- Ddimer - 532 Procalcitonin - 0.08 Ferritin 503  Patient is s/p PO tylenol and IV Decadron 6mg x1.  (23 Dec 2021 20:08)      FAMILY HISTORY:  Family history of heart disease (Father)      Allergies    Bactrim (Unknown)  Betadine (Unknown)  latex (Unknown)  sulfa drugs (Unknown)    Intolerances      MEDICATIONS  (STANDING):  amLODIPine   Tablet 5 milliGRAM(s) Oral daily  aspirin enteric coated 81 milliGRAM(s) Oral daily  chlorhexidine 2% Cloths 1 Application(s) Topical daily  dexAMETHasone  Injectable 6 milliGRAM(s) IV Push daily  dextrose 5%. 1000 milliLiter(s) (50 mL/Hr) IV Continuous <Continuous>  enoxaparin Injectable 80 milliGRAM(s) SubCutaneous every 12 hours  furosemide    Tablet 40 milliGRAM(s) Oral daily  gabapentin 200 milliGRAM(s) Oral two times a day  remdesivir  IVPB 100 milliGRAM(s) IV Intermittent every 24 hours  senna 2 Tablet(s) Oral at bedtime    MEDICATIONS  (PRN):  acetaminophen     Tablet .. 650 milliGRAM(s) Oral every 4 hours PRN Temp greater or equal to 38C (100.4F), Mild Pain (1 - 3), Moderate Pain (4 - 6)  ALBUTerol    90 MICROgram(s) HFA Inhaler 2 Puff(s) Inhalation every 4 hours PRN Shortness of Breath and/or Wheezing  benzonatate 200 milliGRAM(s) Oral three times a day PRN Cough  guaifenesin/dextromethorphan Oral Liquid 10 milliLiter(s) Oral every 4 hours PRN Cough    REVIEW OF SYSTEMS:  CONSTITUTIONAL: No fever, weight loss, or fatigue  RESPIRATORY: No cough, wheezing, chills or hemoptysis; No shortness of breath  CARDIOVASCULAR: No chest pain, palpitations, dizziness, or leg swelling  GASTROINTESTINAL: No abdominal or epigastric pain. No nausea, vomiting, or hematemesis; No diarrhea or constipation. No melena or hematochezia.  NEUROLOGICAL: No headaches, memory loss, loss of strength, numbness, or tremors  MUSCULOSKELETAL: No joint pain or swelling; No muscle, back, or extremity pain  SKIN: No rashes or lesions    Vital Signs Last 24 Hrs  T(C): 36.6 (30 Dec 2021 21:00), Max: 36.8 (30 Dec 2021 06:20)  T(F): 97.9 (30 Dec 2021 21:00), Max: 98.3 (30 Dec 2021 06:20)  HR: 61 (30 Dec 2021 21:00) (56 - 62)  BP: 127/76 (30 Dec 2021 21:00) (125/71 - 131/77)  BP(mean): --  RR: 20 (30 Dec 2021 21:00) (19 - 20)  SpO2: 94% (30 Dec 2021 21:00) (92% - 97%)    T(C): 36.6 (12-30-21 @ 21:00), Max: 36.8 (12-30-21 @ 06:20)  HR: 61 (12-30-21 @ 21:00) (56 - 62)  BP: 127/76 (12-30-21 @ 21:00) (125/71 - 131/77)  RR: 20 (12-30-21 @ 21:00) (19 - 20)  SpO2: 94% (12-30-21 @ 21:00) (92% - 97%)      PHYSICAL EXAM:  GENERAL: NAD, well-groomed, well-developed  HEAD:  Atraumatic, Normocephalic  EYES: EOMI, PERRLA, conjunctiva and sclera clear  NECK: Supple, No JVD  NERVOUS SYSTEM:  Alert & Oriented X3, No gross focal deficits  CHEST/LUNG: Clear to percussion bilaterally; No rales, rhonchi, wheezing, or rubs  HEART: Regular rate and rhythm; S1S2 present  ABDOMEN: Soft, Nontender, Nondistended; Bowel sounds present  EXTREMITIES:  No clubbing, cyanosis, or edema  SKIN: No rashes or lesions

## 2021-12-30 NOTE — PROGRESS NOTE ADULT - ASSESSMENT
LABS:    CBC Full  -  ( 30 Dec 2021 10:55 )  WBC Count : 13.11 K/uL  RBC Count : 4.65 M/uL  Hemoglobin : 14.1 g/dL  Hematocrit : 43.4 %  Platelet Count - Automated : 609 K/uL  Mean Cell Volume : 93.3 fL  Mean Cell Hemoglobin : 30.3 pg  Mean Cell Hemoglobin Concentration : 32.5 gm/dL  Auto Neutrophil # : x  Auto Lymphocyte # : x  Auto Monocyte # : x  Auto Eosinophil # : x  Auto Basophil # : x  Auto Neutrophil % : x  Auto Lymphocyte % : x  Auto Monocyte % : x  Auto Eosinophil % : x  Auto Basophil % : x                          14.1   13.11 )-----------( 609      ( 30 Dec 2021 10:55 )             43.4   12-30    147<H>  |  102  |  22  ----------------------------<  76  3.2<L>   |  30  |  0.42<L>    Ca    9.3      30 Dec 2021 10:55  Phos  2.6     12-30  Mg     2.20     12-30    TPro  6.4  /  Alb  3.1<L>  /  TBili  0.6  /  DBili  x   /  AST  29  /  ALT  38<H>  /  AlkPhos  88  12-30        CAPILLARY BLOOD GLUCOSE      POCT Blood Glucose.: 101 mg/dL (30 Dec 2021 21:17)  POCT Blood Glucose.: 126 mg/dL (30 Dec 2021 15:33)  POCT Blood Glucose.: 120 mg/dL (30 Dec 2021 09:15)  POCT Blood Glucose.: 96 mg/dL (30 Dec 2021 02:57)      HEALTH ISSUES - PROBLEM Dx:  Acute respiratory failure with hypoxia    Pneumonia due to COVID-19 virus    Hypertension    Lower extremity edema    Need for prophylactic measure    Problem/Plan - 1:  ·  Problem: Acute respiratory failure with hypoxia.   ·  Plan: VAPS  EPAP: 9  FIO2: 100%. O2 Sat.: 92-97 %  APRV Biphasic mode: 25/13  TV: 425/504     Problem/Plan - 2:  ·  Problem: Pneumonia due to COVID-19 virus.   ·  Plan: DEXAMETHASONE  REMDESIVIR  LOVENOX  ALBUTEROL.     Problem/Plan - 3:  ·  Problem: Hypertension.   ·  Plan: AMLODIPINE  LASIX  KCL.     Problem/Plan - 4:  ·  Problem: Lower extremity edema.   ·  Plan: LASIX  KCL.     Problem/Plan - 5:  ·  Problem: Need for prophylactic measure.   ·  Plan: As per orders.

## 2021-12-31 NOTE — PROGRESS NOTE ADULT - ASSESSMENT
CBC Full  -  ( 31 Dec 2021 07:09 )  WBC Count : 12.60 K/uL  RBC Count : 4.37 M/uL  Hemoglobin : 13.5 g/dL  Hematocrit : 40.7 %  Platelet Count - Automated : 610 K/uL  Mean Cell Volume : 93.1 fL  Mean Cell Hemoglobin : 30.9 pg  Mean Cell Hemoglobin Concentration : 33.2 gm/dL  Auto Neutrophil # : 9.63 K/uL  Auto Lymphocyte # : 1.59 K/uL  Auto Monocyte # : 0.92 K/uL  Auto Eosinophil # : 0.21 K/uL  Auto Basophil # : 0.03 K/uL  Auto Neutrophil % : 76.5 %  Auto Lymphocyte % : 12.6 %  Auto Monocyte % : 7.3 %  Auto Eosinophil % : 1.7 %  Auto Basophil % : 0.2 %    LABS:                        13.5   12.60 )-----------( 610      ( 31 Dec 2021 07:09 )             40.7   12-31    147<H>  |  105  |  27<H>  ----------------------------<  101<H>  3.4<L>   |  27  |  0.46<L>    Ca    9.3      31 Dec 2021 07:09  Phos  2.4     12-31  Mg     2.30     12-31    TPro  6.0  /  Alb  3.0<L>  /  TBili  0.5  /  DBili  x   /  AST  21  /  ALT  30  /  AlkPhos  81  12-31        CAPILLARY BLOOD GLUCOSE      POCT Blood Glucose.: 94 mg/dL (31 Dec 2021 19:37)  POCT Blood Glucose.: 134 mg/dL (31 Dec 2021 13:01)  POCT Blood Glucose.: 129 mg/dL (31 Dec 2021 08:50)  POCT Blood Glucose.: 101 mg/dL (31 Dec 2021 03:16)      HEALTH ISSUES - PROBLEM Dx:  Acute respiratory failure with hypoxia    Pneumonia due to COVID-19 virus    Hypertension    Lower extremity edema    Need for prophylactic measure      Problem/Plan - 1:  ·  Problem: Acute respiratory failure with hypoxia.   ·  Plan: VAPS  EPAP: 9  FIO2: 100%. O2 Sat.: 92-97 %  APRV Biphasic mode: 25/13  TV: 425/434     Problem/Plan - 2:  ·  Problem: Pneumonia due to COVID-19 virus.   ·  Plan: DEXAMETHASONE  REMDESIVIR  LOVENOX  ALBUTEROL.     Problem/Plan - 3:  ·  Problem: Hypertension.   ·  Plan: AMLODIPINE  LASIX  KCL.     Problem/Plan - 4:  ·  Problem: Lower extremity edema.   ·  Plan: LASIX  KCL.     Problem/Plan - 5:  ·  Problem: Need for prophylactic measure.   ·  Plan: As per orders.

## 2021-12-31 NOTE — PROGRESS NOTE ADULT - SUBJECTIVE AND OBJECTIVE BOX
RENNY PERSAUD Female  Patient is a 76y old  Female who presents with a chief complaint of + Acute hypoxic respiratory failure 2/2 COVID pneumonia (30 Dec 2021 23:23)      INTERVAL HPI/OVERNIGHT EVENTS:  HPI:  76 year old Female PMHx HTN, Asthma as a child (No hx of intubations/hospitalizations, Venous insufficiency, Lymphedema  Osteoarthritis presenting with fatigue, productive cough, and dyspnea. Patient report she was exposed to sick contact-  + COVID x 20 days ago. Patients  is currently admitted at [facility]. Patient states she did not have any symptoms however her sister who works as a nurse noted pt appeared dyspneic check Sp02 at home 66% She sent a rapid test today that came back + COVID. Notified PCP - Dr. Cherise Amos who recommended her to go to the hospital. Pt reports she took Zpak on 12/17 as a preventative measure and completed course. Endorses some decreased appetite and SOB now improved at rest on HFNC. Patient is unvaccinated, denies having COVID in the past. Has hx of chronic LE swelling is on PO lasix.  Denies hx of PE/DVT, fever, body aches, headache, vision change, chest pain, abdominal pain, nausea, vomiting, melena, hematochezia, dysuria, urinary frequency, calf pain.     In ED vitals: Temp 98F HR 75 /83 SP02 initially 70s on NC currently 95% on 15L NRB - escalated to HFNC  Labs- Ddimer - 532 Procalcitonin - 0.08 Ferritin 503  Patient is s/p PO tylenol and IV Decadron 6mg x1.  (23 Dec 2021 20:08)      FAMILY HISTORY:  Family history of heart disease (Father)      Allergies    Bactrim (Unknown)  Betadine (Unknown)  latex (Unknown)  sulfa drugs (Unknown)    Intolerances      MEDICATIONS  (STANDING):  amLODIPine   Tablet 5 milliGRAM(s) Oral daily  aspirin enteric coated 81 milliGRAM(s) Oral daily  chlorhexidine 2% Cloths 1 Application(s) Topical daily  dexAMETHasone  Injectable 6 milliGRAM(s) IV Push daily  dextrose 5%. 1000 milliLiter(s) (50 mL/Hr) IV Continuous <Continuous>  enoxaparin Injectable 80 milliGRAM(s) SubCutaneous every 12 hours  furosemide    Tablet 40 milliGRAM(s) Oral daily  gabapentin 200 milliGRAM(s) Oral two times a day  remdesivir  IVPB 100 milliGRAM(s) IV Intermittent every 24 hours  senna 2 Tablet(s) Oral at bedtime    MEDICATIONS  (PRN):  acetaminophen     Tablet .. 650 milliGRAM(s) Oral every 4 hours PRN Temp greater or equal to 38C (100.4F), Mild Pain (1 - 3), Moderate Pain (4 - 6)  ALBUTerol    90 MICROgram(s) HFA Inhaler 2 Puff(s) Inhalation every 4 hours PRN Shortness of Breath and/or Wheezing  benzonatate 200 milliGRAM(s) Oral three times a day PRN Cough  guaifenesin/dextromethorphan Oral Liquid 10 milliLiter(s) Oral every 4 hours PRN Cough    REVIEW OF SYSTEMS:  CONSTITUTIONAL: No fever, weight loss, or fatigue  RESPIRATORY: No cough, wheezing, chills or hemoptysis; No shortness of breath  CARDIOVASCULAR: No chest pain, palpitations, dizziness, or leg swelling  GASTROINTESTINAL: No abdominal or epigastric pain. No nausea, vomiting, or hematemesis; No diarrhea or constipation. No melena or hematochezia.  NEUROLOGICAL: No headaches, memory loss, loss of strength, numbness, or tremors  MUSCULOSKELETAL: No joint pain or swelling; No muscle, back, or extremity pain  SKIN: No rashes or lesions    Vital Signs Last 24 Hrs  T(C): 37 (31 Dec 2021 21:07), Max: 37 (31 Dec 2021 17:40)  T(F): 98.6 (31 Dec 2021 21:07), Max: 98.6 (31 Dec 2021 17:40)  HR: 69 (31 Dec 2021 21:07) (67 - 77)  BP: 105/62 (31 Dec 2021 21:07) (105/62 - 122/74)  BP(mean): --  RR: 20 (31 Dec 2021 21:07) (20 - 20)  SpO2: 94% (31 Dec 2021 21:07) (92% - 97%)    T(C): 37 (12-31-21 @ 21:07), Max: 37 (12-31-21 @ 17:40)  HR: 69 (12-31-21 @ 21:07) (67 - 77)  BP: 105/62 (12-31-21 @ 21:07) (105/62 - 122/74)  RR: 20 (12-31-21 @ 21:07) (20 - 20)  SpO2: 94% (12-31-21 @ 21:07) (92% - 97%)    I&O's Summary    30 Dec 2021 07:01  -  31 Dec 2021 07:00  --------------------------------------------------------  IN: 0 mL / OUT: 1400 mL / NET: -1400 mL    31 Dec 2021 07:01  -  31 Dec 2021 22:44  --------------------------------------------------------  IN: 65 mL / OUT: 600 mL / NET: -535 mL    PHYSICAL EXAM:  GENERAL: NAD, well-groomed, well-developed  HEAD:  Atraumatic, Normocephalic  EYES: EOMI, PERRLA, conjunctiva and sclera clear  NECK: Supple, No JVD  NERVOUS SYSTEM:  Alert & Oriented X3, No gross focal deficits  CHEST/LUNG: Clear to percussion bilaterally; No rales, rhonchi, wheezing, or rubs  HEART: Regular rate and rhythm; S1S2 present  ABDOMEN: Soft, Nontender, Nondistended; Bowel sounds present  EXTREMITIES:  No clubbing, cyanosis, or edema  SKIN: No rashes or lesions

## 2022-01-01 VITALS — OXYGEN SATURATION: 96 % | HEART RATE: 82 BPM | RESPIRATION RATE: 18 BRPM

## 2022-01-01 DIAGNOSIS — D72.829 ELEVATED WHITE BLOOD CELL COUNT, UNSPECIFIED: ICD-10-CM

## 2022-01-01 DIAGNOSIS — E87.1 HYPO-OSMOLALITY AND HYPONATREMIA: ICD-10-CM

## 2022-01-01 LAB
ALBUMIN SERPL ELPH-MCNC: 3.1 G/DL — LOW (ref 3.3–5)
ALBUMIN SERPL ELPH-MCNC: 3.2 G/DL — LOW (ref 3.3–5)
ALBUMIN SERPL ELPH-MCNC: 3.3 G/DL — SIGNIFICANT CHANGE UP (ref 3.3–5)
ALBUMIN SERPL ELPH-MCNC: 3.6 G/DL — SIGNIFICANT CHANGE UP (ref 3.3–5)
ALP SERPL-CCNC: 66 U/L — SIGNIFICANT CHANGE UP (ref 40–120)
ALP SERPL-CCNC: 70 U/L — SIGNIFICANT CHANGE UP (ref 40–120)
ALP SERPL-CCNC: 79 U/L — SIGNIFICANT CHANGE UP (ref 40–120)
ALP SERPL-CCNC: 80 U/L — SIGNIFICANT CHANGE UP (ref 40–120)
ALP SERPL-CCNC: 80 U/L — SIGNIFICANT CHANGE UP (ref 40–120)
ALP SERPL-CCNC: 82 U/L — SIGNIFICANT CHANGE UP (ref 40–120)
ALP SERPL-CCNC: 84 U/L — SIGNIFICANT CHANGE UP (ref 40–120)
ALT FLD-CCNC: 21 U/L — SIGNIFICANT CHANGE UP (ref 4–33)
ALT FLD-CCNC: 22 U/L — SIGNIFICANT CHANGE UP (ref 4–33)
ALT FLD-CCNC: 23 U/L — SIGNIFICANT CHANGE UP (ref 4–33)
ALT FLD-CCNC: 26 U/L — SIGNIFICANT CHANGE UP (ref 4–33)
ALT FLD-CCNC: 27 U/L — SIGNIFICANT CHANGE UP (ref 4–33)
ALT FLD-CCNC: 28 U/L — SIGNIFICANT CHANGE UP (ref 4–33)
ALT FLD-CCNC: 30 U/L — SIGNIFICANT CHANGE UP (ref 4–33)
ANION GAP SERPL CALC-SCNC: 10 MMOL/L — SIGNIFICANT CHANGE UP (ref 7–14)
ANION GAP SERPL CALC-SCNC: 12 MMOL/L — SIGNIFICANT CHANGE UP (ref 7–14)
ANION GAP SERPL CALC-SCNC: 13 MMOL/L — SIGNIFICANT CHANGE UP (ref 7–14)
ANION GAP SERPL CALC-SCNC: 14 MMOL/L — SIGNIFICANT CHANGE UP (ref 7–14)
ANION GAP SERPL CALC-SCNC: 15 MMOL/L — HIGH (ref 7–14)
ANION GAP SERPL CALC-SCNC: 16 MMOL/L — HIGH (ref 7–14)
ANION GAP SERPL CALC-SCNC: 16 MMOL/L — HIGH (ref 7–14)
ANION GAP SERPL CALC-SCNC: 17 MMOL/L — HIGH (ref 7–14)
ANION GAP SERPL CALC-SCNC: 17 MMOL/L — HIGH (ref 7–14)
ANION GAP SERPL CALC-SCNC: 21 MMOL/L — HIGH (ref 7–14)
ANION GAP SERPL CALC-SCNC: 21 MMOL/L — HIGH (ref 7–14)
ANION GAP SERPL CALC-SCNC: 9 MMOL/L — SIGNIFICANT CHANGE UP (ref 7–14)
APPEARANCE UR: CLEAR — SIGNIFICANT CHANGE UP
AST SERPL-CCNC: 15 U/L — SIGNIFICANT CHANGE UP (ref 4–32)
AST SERPL-CCNC: 20 U/L — SIGNIFICANT CHANGE UP (ref 4–32)
AST SERPL-CCNC: 23 U/L — SIGNIFICANT CHANGE UP (ref 4–32)
AST SERPL-CCNC: 23 U/L — SIGNIFICANT CHANGE UP (ref 4–32)
AST SERPL-CCNC: 24 U/L — SIGNIFICANT CHANGE UP (ref 4–32)
AST SERPL-CCNC: 24 U/L — SIGNIFICANT CHANGE UP (ref 4–32)
AST SERPL-CCNC: 25 U/L — SIGNIFICANT CHANGE UP (ref 4–32)
BACTERIA # UR AUTO: NEGATIVE — SIGNIFICANT CHANGE UP
BASOPHILS # BLD AUTO: 0 K/UL — SIGNIFICANT CHANGE UP (ref 0–0.2)
BASOPHILS # BLD AUTO: 0 K/UL — SIGNIFICANT CHANGE UP (ref 0–0.2)
BASOPHILS # BLD AUTO: 0.03 K/UL — SIGNIFICANT CHANGE UP (ref 0–0.2)
BASOPHILS # BLD AUTO: 0.04 K/UL — SIGNIFICANT CHANGE UP (ref 0–0.2)
BASOPHILS # BLD AUTO: 0.07 K/UL — SIGNIFICANT CHANGE UP (ref 0–0.2)
BASOPHILS NFR BLD AUTO: 0 % — SIGNIFICANT CHANGE UP (ref 0–2)
BASOPHILS NFR BLD AUTO: 0 % — SIGNIFICANT CHANGE UP (ref 0–2)
BASOPHILS NFR BLD AUTO: 0.2 % — SIGNIFICANT CHANGE UP (ref 0–2)
BASOPHILS NFR BLD AUTO: 0.3 % — SIGNIFICANT CHANGE UP (ref 0–2)
BILIRUB DIRECT SERPL-MCNC: <0.2 MG/DL — SIGNIFICANT CHANGE UP (ref 0–0.3)
BILIRUB INDIRECT FLD-MCNC: >0.4 MG/DL — SIGNIFICANT CHANGE UP (ref 0–1)
BILIRUB SERPL-MCNC: 0.5 MG/DL — SIGNIFICANT CHANGE UP (ref 0.2–1.2)
BILIRUB SERPL-MCNC: 0.5 MG/DL — SIGNIFICANT CHANGE UP (ref 0.2–1.2)
BILIRUB SERPL-MCNC: 0.6 MG/DL — SIGNIFICANT CHANGE UP (ref 0.2–1.2)
BILIRUB SERPL-MCNC: 0.7 MG/DL — SIGNIFICANT CHANGE UP (ref 0.2–1.2)
BILIRUB UR-MCNC: NEGATIVE — SIGNIFICANT CHANGE UP
BUN SERPL-MCNC: 22 MG/DL — SIGNIFICANT CHANGE UP (ref 7–23)
BUN SERPL-MCNC: 27 MG/DL — HIGH (ref 7–23)
BUN SERPL-MCNC: 27 MG/DL — HIGH (ref 7–23)
BUN SERPL-MCNC: 28 MG/DL — HIGH (ref 7–23)
BUN SERPL-MCNC: 29 MG/DL — HIGH (ref 7–23)
BUN SERPL-MCNC: 31 MG/DL — HIGH (ref 7–23)
BUN SERPL-MCNC: 31 MG/DL — HIGH (ref 7–23)
BUN SERPL-MCNC: 34 MG/DL — HIGH (ref 7–23)
BUN SERPL-MCNC: 37 MG/DL — HIGH (ref 7–23)
BUN SERPL-MCNC: 38 MG/DL — HIGH (ref 7–23)
BUN SERPL-MCNC: 43 MG/DL — HIGH (ref 7–23)
BUN SERPL-MCNC: 44 MG/DL — HIGH (ref 7–23)
BUN SERPL-MCNC: 56 MG/DL — HIGH (ref 7–23)
BUN SERPL-MCNC: 63 MG/DL — HIGH (ref 7–23)
CALCIUM SERPL-MCNC: 10.2 MG/DL — SIGNIFICANT CHANGE UP (ref 8.4–10.5)
CALCIUM SERPL-MCNC: 10.3 MG/DL — SIGNIFICANT CHANGE UP (ref 8.4–10.5)
CALCIUM SERPL-MCNC: 7.9 MG/DL — LOW (ref 8.4–10.5)
CALCIUM SERPL-MCNC: 8.1 MG/DL — LOW (ref 8.4–10.5)
CALCIUM SERPL-MCNC: 8.1 MG/DL — LOW (ref 8.4–10.5)
CALCIUM SERPL-MCNC: 8.3 MG/DL — LOW (ref 8.4–10.5)
CALCIUM SERPL-MCNC: 9.1 MG/DL — SIGNIFICANT CHANGE UP (ref 8.4–10.5)
CALCIUM SERPL-MCNC: 9.1 MG/DL — SIGNIFICANT CHANGE UP (ref 8.4–10.5)
CALCIUM SERPL-MCNC: 9.3 MG/DL — SIGNIFICANT CHANGE UP (ref 8.4–10.5)
CALCIUM SERPL-MCNC: 9.3 MG/DL — SIGNIFICANT CHANGE UP (ref 8.4–10.5)
CALCIUM SERPL-MCNC: 9.4 MG/DL — SIGNIFICANT CHANGE UP (ref 8.4–10.5)
CALCIUM SERPL-MCNC: 9.7 MG/DL — SIGNIFICANT CHANGE UP (ref 8.4–10.5)
CALCIUM SERPL-MCNC: 9.7 MG/DL — SIGNIFICANT CHANGE UP (ref 8.4–10.5)
CALCIUM SERPL-MCNC: 9.8 MG/DL — SIGNIFICANT CHANGE UP (ref 8.4–10.5)
CHLORIDE SERPL-SCNC: 100 MMOL/L — SIGNIFICANT CHANGE UP (ref 98–107)
CHLORIDE SERPL-SCNC: 102 MMOL/L — SIGNIFICANT CHANGE UP (ref 98–107)
CHLORIDE SERPL-SCNC: 105 MMOL/L — SIGNIFICANT CHANGE UP (ref 98–107)
CHLORIDE SERPL-SCNC: 108 MMOL/L — HIGH (ref 98–107)
CHLORIDE SERPL-SCNC: 70 MMOL/L — LOW (ref 98–107)
CHLORIDE SERPL-SCNC: 72 MMOL/L — LOW (ref 98–107)
CHLORIDE SERPL-SCNC: 74 MMOL/L — LOW (ref 98–107)
CHLORIDE SERPL-SCNC: 74 MMOL/L — LOW (ref 98–107)
CHLORIDE SERPL-SCNC: 79 MMOL/L — LOW (ref 98–107)
CHLORIDE SERPL-SCNC: 84 MMOL/L — LOW (ref 98–107)
CHLORIDE SERPL-SCNC: 85 MMOL/L — LOW (ref 98–107)
CHLORIDE SERPL-SCNC: 88 MMOL/L — LOW (ref 98–107)
CHLORIDE SERPL-SCNC: 92 MMOL/L — LOW (ref 98–107)
CHLORIDE SERPL-SCNC: 96 MMOL/L — LOW (ref 98–107)
CO2 SERPL-SCNC: 16 MMOL/L — LOW (ref 22–31)
CO2 SERPL-SCNC: 17 MMOL/L — LOW (ref 22–31)
CO2 SERPL-SCNC: 23 MMOL/L — SIGNIFICANT CHANGE UP (ref 22–31)
CO2 SERPL-SCNC: 26 MMOL/L — SIGNIFICANT CHANGE UP (ref 22–31)
CO2 SERPL-SCNC: 26 MMOL/L — SIGNIFICANT CHANGE UP (ref 22–31)
CO2 SERPL-SCNC: 27 MMOL/L — SIGNIFICANT CHANGE UP (ref 22–31)
CO2 SERPL-SCNC: 27 MMOL/L — SIGNIFICANT CHANGE UP (ref 22–31)
CO2 SERPL-SCNC: 28 MMOL/L — SIGNIFICANT CHANGE UP (ref 22–31)
CO2 SERPL-SCNC: 29 MMOL/L — SIGNIFICANT CHANGE UP (ref 22–31)
CO2 SERPL-SCNC: 29 MMOL/L — SIGNIFICANT CHANGE UP (ref 22–31)
CO2 SERPL-SCNC: 31 MMOL/L — SIGNIFICANT CHANGE UP (ref 22–31)
CO2 SERPL-SCNC: 33 MMOL/L — HIGH (ref 22–31)
COLOR SPEC: YELLOW — SIGNIFICANT CHANGE UP
CREAT SERPL-MCNC: 0.43 MG/DL — LOW (ref 0.5–1.3)
CREAT SERPL-MCNC: 0.44 MG/DL — LOW (ref 0.5–1.3)
CREAT SERPL-MCNC: 0.45 MG/DL — LOW (ref 0.5–1.3)
CREAT SERPL-MCNC: 0.45 MG/DL — LOW (ref 0.5–1.3)
CREAT SERPL-MCNC: 0.47 MG/DL — LOW (ref 0.5–1.3)
CREAT SERPL-MCNC: 0.47 MG/DL — LOW (ref 0.5–1.3)
CREAT SERPL-MCNC: 0.49 MG/DL — LOW (ref 0.5–1.3)
CREAT SERPL-MCNC: 0.49 MG/DL — LOW (ref 0.5–1.3)
CREAT SERPL-MCNC: 0.57 MG/DL — SIGNIFICANT CHANGE UP (ref 0.5–1.3)
CREAT SERPL-MCNC: 0.62 MG/DL — SIGNIFICANT CHANGE UP (ref 0.5–1.3)
CREAT SERPL-MCNC: 0.65 MG/DL — SIGNIFICANT CHANGE UP (ref 0.5–1.3)
CREAT SERPL-MCNC: 0.71 MG/DL — SIGNIFICANT CHANGE UP (ref 0.5–1.3)
CREAT SERPL-MCNC: 1.16 MG/DL — SIGNIFICANT CHANGE UP (ref 0.5–1.3)
CREAT SERPL-MCNC: 1.48 MG/DL — HIGH (ref 0.5–1.3)
CRP SERPL-MCNC: 111.1 MG/L — HIGH
CRP SERPL-MCNC: 18.1 MG/L — HIGH
CRP SERPL-MCNC: 41.4 MG/L — HIGH
CRP SERPL-MCNC: 6.4 MG/L — HIGH
CULTURE RESULTS: SIGNIFICANT CHANGE UP
CULTURE RESULTS: SIGNIFICANT CHANGE UP
D DIMER BLD IA.RAPID-MCNC: 205 NG/ML DDU — SIGNIFICANT CHANGE UP
D DIMER BLD IA.RAPID-MCNC: 246 NG/ML DDU — HIGH
D DIMER BLD IA.RAPID-MCNC: 626 NG/ML DDU — HIGH
DIFF PNL FLD: NEGATIVE — SIGNIFICANT CHANGE UP
EOSINOPHIL # BLD AUTO: 0 K/UL — SIGNIFICANT CHANGE UP (ref 0–0.5)
EOSINOPHIL # BLD AUTO: 0.01 K/UL — SIGNIFICANT CHANGE UP (ref 0–0.5)
EOSINOPHIL # BLD AUTO: 0.19 K/UL — SIGNIFICANT CHANGE UP (ref 0–0.5)
EOSINOPHIL # BLD AUTO: 0.25 K/UL — SIGNIFICANT CHANGE UP (ref 0–0.5)
EOSINOPHIL NFR BLD AUTO: 0 % — SIGNIFICANT CHANGE UP (ref 0–6)
EOSINOPHIL NFR BLD AUTO: 0.1 % — SIGNIFICANT CHANGE UP (ref 0–6)
EOSINOPHIL NFR BLD AUTO: 1.2 % — SIGNIFICANT CHANGE UP (ref 0–6)
EOSINOPHIL NFR BLD AUTO: 1.9 % — SIGNIFICANT CHANGE UP (ref 0–6)
EPI CELLS # UR: 1 /HPF — SIGNIFICANT CHANGE UP (ref 0–5)
FERRITIN SERPL-MCNC: 1157 NG/ML — HIGH (ref 15–150)
FERRITIN SERPL-MCNC: 488 NG/ML — HIGH (ref 15–150)
FERRITIN SERPL-MCNC: 725 NG/ML — HIGH (ref 15–150)
FERRITIN SERPL-MCNC: 845 NG/ML — HIGH (ref 15–150)
GLUCOSE BLDC GLUCOMTR-MCNC: 102 MG/DL — HIGH (ref 70–99)
GLUCOSE BLDC GLUCOMTR-MCNC: 102 MG/DL — HIGH (ref 70–99)
GLUCOSE BLDC GLUCOMTR-MCNC: 104 MG/DL — HIGH (ref 70–99)
GLUCOSE BLDC GLUCOMTR-MCNC: 108 MG/DL — HIGH (ref 70–99)
GLUCOSE BLDC GLUCOMTR-MCNC: 112 MG/DL — HIGH (ref 70–99)
GLUCOSE BLDC GLUCOMTR-MCNC: 120 MG/DL — HIGH (ref 70–99)
GLUCOSE BLDC GLUCOMTR-MCNC: 121 MG/DL — HIGH (ref 70–99)
GLUCOSE BLDC GLUCOMTR-MCNC: 121 MG/DL — HIGH (ref 70–99)
GLUCOSE BLDC GLUCOMTR-MCNC: 126 MG/DL — HIGH (ref 70–99)
GLUCOSE BLDC GLUCOMTR-MCNC: 134 MG/DL — HIGH (ref 70–99)
GLUCOSE BLDC GLUCOMTR-MCNC: 134 MG/DL — HIGH (ref 70–99)
GLUCOSE BLDC GLUCOMTR-MCNC: 135 MG/DL — HIGH (ref 70–99)
GLUCOSE BLDC GLUCOMTR-MCNC: 138 MG/DL — HIGH (ref 70–99)
GLUCOSE BLDC GLUCOMTR-MCNC: 139 MG/DL — HIGH (ref 70–99)
GLUCOSE BLDC GLUCOMTR-MCNC: 142 MG/DL — HIGH (ref 70–99)
GLUCOSE BLDC GLUCOMTR-MCNC: 142 MG/DL — HIGH (ref 70–99)
GLUCOSE BLDC GLUCOMTR-MCNC: 152 MG/DL — HIGH (ref 70–99)
GLUCOSE BLDC GLUCOMTR-MCNC: 155 MG/DL — HIGH (ref 70–99)
GLUCOSE BLDC GLUCOMTR-MCNC: 160 MG/DL — HIGH (ref 70–99)
GLUCOSE BLDC GLUCOMTR-MCNC: 163 MG/DL — HIGH (ref 70–99)
GLUCOSE BLDC GLUCOMTR-MCNC: 164 MG/DL — HIGH (ref 70–99)
GLUCOSE BLDC GLUCOMTR-MCNC: 168 MG/DL — HIGH (ref 70–99)
GLUCOSE BLDC GLUCOMTR-MCNC: 173 MG/DL — HIGH (ref 70–99)
GLUCOSE BLDC GLUCOMTR-MCNC: 183 MG/DL — HIGH (ref 70–99)
GLUCOSE BLDC GLUCOMTR-MCNC: 211 MG/DL — HIGH (ref 70–99)
GLUCOSE BLDC GLUCOMTR-MCNC: 238 MG/DL — HIGH (ref 70–99)
GLUCOSE BLDC GLUCOMTR-MCNC: 89 MG/DL — SIGNIFICANT CHANGE UP (ref 70–99)
GLUCOSE BLDC GLUCOMTR-MCNC: 89 MG/DL — SIGNIFICANT CHANGE UP (ref 70–99)
GLUCOSE SERPL-MCNC: 115 MG/DL — HIGH (ref 70–99)
GLUCOSE SERPL-MCNC: 125 MG/DL — HIGH (ref 70–99)
GLUCOSE SERPL-MCNC: 139 MG/DL — HIGH (ref 70–99)
GLUCOSE SERPL-MCNC: 145 MG/DL — HIGH (ref 70–99)
GLUCOSE SERPL-MCNC: 160 MG/DL — HIGH (ref 70–99)
GLUCOSE SERPL-MCNC: 163 MG/DL — HIGH (ref 70–99)
GLUCOSE SERPL-MCNC: 164 MG/DL — HIGH (ref 70–99)
GLUCOSE SERPL-MCNC: 198 MG/DL — HIGH (ref 70–99)
GLUCOSE SERPL-MCNC: 207 MG/DL — HIGH (ref 70–99)
GLUCOSE SERPL-MCNC: 257 MG/DL — HIGH (ref 70–99)
GLUCOSE SERPL-MCNC: 259 MG/DL — HIGH (ref 70–99)
GLUCOSE SERPL-MCNC: 290 MG/DL — HIGH (ref 70–99)
GLUCOSE SERPL-MCNC: 93 MG/DL — SIGNIFICANT CHANGE UP (ref 70–99)
GLUCOSE SERPL-MCNC: 97 MG/DL — SIGNIFICANT CHANGE UP (ref 70–99)
GLUCOSE UR QL: NEGATIVE — SIGNIFICANT CHANGE UP
HCT VFR BLD CALC: 20.7 % — CRITICAL LOW (ref 34.5–45)
HCT VFR BLD CALC: 34.9 % — SIGNIFICANT CHANGE UP (ref 34.5–45)
HCT VFR BLD CALC: 40.6 % — SIGNIFICANT CHANGE UP (ref 34.5–45)
HCT VFR BLD CALC: 41.6 % — SIGNIFICANT CHANGE UP (ref 34.5–45)
HCT VFR BLD CALC: 41.9 % — SIGNIFICANT CHANGE UP (ref 34.5–45)
HCT VFR BLD CALC: 41.9 % — SIGNIFICANT CHANGE UP (ref 34.5–45)
HCT VFR BLD CALC: 42.3 % — SIGNIFICANT CHANGE UP (ref 34.5–45)
HCT VFR BLD CALC: 42.7 % — SIGNIFICANT CHANGE UP (ref 34.5–45)
HCT VFR BLD CALC: 44.2 % — SIGNIFICANT CHANGE UP (ref 34.5–45)
HCT VFR BLD CALC: 44.7 % — SIGNIFICANT CHANGE UP (ref 34.5–45)
HCT VFR BLD CALC: 52.8 % — HIGH (ref 34.5–45)
HGB BLD-MCNC: 12.1 G/DL — SIGNIFICANT CHANGE UP (ref 11.5–15.5)
HGB BLD-MCNC: 13.1 G/DL — SIGNIFICANT CHANGE UP (ref 11.5–15.5)
HGB BLD-MCNC: 13.1 G/DL — SIGNIFICANT CHANGE UP (ref 11.5–15.5)
HGB BLD-MCNC: 13.4 G/DL — SIGNIFICANT CHANGE UP (ref 11.5–15.5)
HGB BLD-MCNC: 13.9 G/DL — SIGNIFICANT CHANGE UP (ref 11.5–15.5)
HGB BLD-MCNC: 14 G/DL — SIGNIFICANT CHANGE UP (ref 11.5–15.5)
HGB BLD-MCNC: 14.3 G/DL — SIGNIFICANT CHANGE UP (ref 11.5–15.5)
HGB BLD-MCNC: 14.5 G/DL — SIGNIFICANT CHANGE UP (ref 11.5–15.5)
HGB BLD-MCNC: 14.6 G/DL — SIGNIFICANT CHANGE UP (ref 11.5–15.5)
HGB BLD-MCNC: 16.2 G/DL — HIGH (ref 11.5–15.5)
HGB BLD-MCNC: 7.6 G/DL — LOW (ref 11.5–15.5)
HYALINE CASTS # UR AUTO: 0 /LPF — SIGNIFICANT CHANGE UP (ref 0–7)
IANC: 12.13 K/UL — HIGH (ref 1.5–8.5)
IANC: 12.69 K/UL — HIGH (ref 1.5–8.5)
IANC: 14.93 K/UL — HIGH (ref 1.5–8.5)
IANC: 16.5 K/UL — HIGH (ref 1.5–8.5)
IANC: 19.83 K/UL — HIGH (ref 1.5–8.5)
IANC: 26.12 K/UL — HIGH (ref 1.5–8.5)
IANC: 47.58 K/UL — HIGH (ref 1.5–8.5)
IANC: 9.51 K/UL — HIGH (ref 1.5–8.5)
IMM GRANULOCYTES NFR BLD AUTO: 1.3 % — SIGNIFICANT CHANGE UP (ref 0–1.5)
IMM GRANULOCYTES NFR BLD AUTO: 1.3 % — SIGNIFICANT CHANGE UP (ref 0–1.5)
IMM GRANULOCYTES NFR BLD AUTO: 1.6 % — HIGH (ref 0–1.5)
IMM GRANULOCYTES NFR BLD AUTO: 1.7 % — HIGH (ref 0–1.5)
IMM GRANULOCYTES NFR BLD AUTO: 1.8 % — HIGH (ref 0–1.5)
IMM GRANULOCYTES NFR BLD AUTO: 2.3 % — HIGH (ref 0–1.5)
KETONES UR-MCNC: ABNORMAL
LACTATE SERPL-SCNC: 2.5 MMOL/L — HIGH (ref 0.5–2)
LDH SERPL L TO P-CCNC: 332 U/L — HIGH (ref 135–225)
LDH SERPL L TO P-CCNC: 348 U/L — HIGH (ref 135–225)
LDH SERPL L TO P-CCNC: 373 U/L — HIGH (ref 135–225)
LDH SERPL L TO P-CCNC: 401 U/L — HIGH (ref 135–225)
LEUKOCYTE ESTERASE UR-ACNC: NEGATIVE — SIGNIFICANT CHANGE UP
LYMPHOCYTES # BLD AUTO: 0.57 K/UL — LOW (ref 1–3.3)
LYMPHOCYTES # BLD AUTO: 1.15 K/UL — SIGNIFICANT CHANGE UP (ref 1–3.3)
LYMPHOCYTES # BLD AUTO: 1.5 K/UL — SIGNIFICANT CHANGE UP (ref 1–3.3)
LYMPHOCYTES # BLD AUTO: 1.53 K/UL — SIGNIFICANT CHANGE UP (ref 1–3.3)
LYMPHOCYTES # BLD AUTO: 1.61 K/UL — SIGNIFICANT CHANGE UP (ref 1–3.3)
LYMPHOCYTES # BLD AUTO: 1.8 % — LOW (ref 13–44)
LYMPHOCYTES # BLD AUTO: 1.88 K/UL — SIGNIFICANT CHANGE UP (ref 1–3.3)
LYMPHOCYTES # BLD AUTO: 1.92 K/UL — SIGNIFICANT CHANGE UP (ref 1–3.3)
LYMPHOCYTES # BLD AUTO: 11.7 % — LOW (ref 13–44)
LYMPHOCYTES # BLD AUTO: 14.5 % — SIGNIFICANT CHANGE UP (ref 13–44)
LYMPHOCYTES # BLD AUTO: 3.97 K/UL — HIGH (ref 1–3.3)
LYMPHOCYTES # BLD AUTO: 6.9 % — LOW (ref 13–44)
LYMPHOCYTES # BLD AUTO: 7 % — LOW (ref 13–44)
LYMPHOCYTES # BLD AUTO: 7.7 % — LOW (ref 13–44)
LYMPHOCYTES # BLD AUTO: 7.9 % — LOW (ref 13–44)
LYMPHOCYTES # BLD AUTO: 8.4 % — LOW (ref 13–44)
MAGNESIUM SERPL-MCNC: 1.8 MG/DL — SIGNIFICANT CHANGE UP (ref 1.6–2.6)
MAGNESIUM SERPL-MCNC: 1.8 MG/DL — SIGNIFICANT CHANGE UP (ref 1.6–2.6)
MAGNESIUM SERPL-MCNC: 1.9 MG/DL — SIGNIFICANT CHANGE UP (ref 1.6–2.6)
MAGNESIUM SERPL-MCNC: 2.1 MG/DL — SIGNIFICANT CHANGE UP (ref 1.6–2.6)
MAGNESIUM SERPL-MCNC: 2.2 MG/DL — SIGNIFICANT CHANGE UP (ref 1.6–2.6)
MAGNESIUM SERPL-MCNC: 2.3 MG/DL — SIGNIFICANT CHANGE UP (ref 1.6–2.6)
MAGNESIUM SERPL-MCNC: 2.4 MG/DL — SIGNIFICANT CHANGE UP (ref 1.6–2.6)
MAGNESIUM SERPL-MCNC: 2.4 MG/DL — SIGNIFICANT CHANGE UP (ref 1.6–2.6)
MAGNESIUM SERPL-MCNC: 2.5 MG/DL — SIGNIFICANT CHANGE UP (ref 1.6–2.6)
MAGNESIUM SERPL-MCNC: 2.5 MG/DL — SIGNIFICANT CHANGE UP (ref 1.6–2.6)
MAGNESIUM SERPL-MCNC: 2.6 MG/DL — SIGNIFICANT CHANGE UP (ref 1.6–2.6)
MCHC RBC-ENTMCNC: 29.8 PG — SIGNIFICANT CHANGE UP (ref 27–34)
MCHC RBC-ENTMCNC: 29.9 PG — SIGNIFICANT CHANGE UP (ref 27–34)
MCHC RBC-ENTMCNC: 30 PG — SIGNIFICANT CHANGE UP (ref 27–34)
MCHC RBC-ENTMCNC: 30.1 PG — SIGNIFICANT CHANGE UP (ref 27–34)
MCHC RBC-ENTMCNC: 30.4 PG — SIGNIFICANT CHANGE UP (ref 27–34)
MCHC RBC-ENTMCNC: 30.5 PG — SIGNIFICANT CHANGE UP (ref 27–34)
MCHC RBC-ENTMCNC: 30.7 GM/DL — LOW (ref 32–36)
MCHC RBC-ENTMCNC: 30.7 PG — SIGNIFICANT CHANGE UP (ref 27–34)
MCHC RBC-ENTMCNC: 31 PG — SIGNIFICANT CHANGE UP (ref 27–34)
MCHC RBC-ENTMCNC: 31 PG — SIGNIFICANT CHANGE UP (ref 27–34)
MCHC RBC-ENTMCNC: 31.3 GM/DL — LOW (ref 32–36)
MCHC RBC-ENTMCNC: 31.4 GM/DL — LOW (ref 32–36)
MCHC RBC-ENTMCNC: 32.2 GM/DL — SIGNIFICANT CHANGE UP (ref 32–36)
MCHC RBC-ENTMCNC: 32.3 GM/DL — SIGNIFICANT CHANGE UP (ref 32–36)
MCHC RBC-ENTMCNC: 32.4 GM/DL — SIGNIFICANT CHANGE UP (ref 32–36)
MCHC RBC-ENTMCNC: 32.8 GM/DL — SIGNIFICANT CHANGE UP (ref 32–36)
MCHC RBC-ENTMCNC: 33.8 GM/DL — SIGNIFICANT CHANGE UP (ref 32–36)
MCHC RBC-ENTMCNC: 34.7 GM/DL — SIGNIFICANT CHANGE UP (ref 32–36)
MCHC RBC-ENTMCNC: 34.8 GM/DL — SIGNIFICANT CHANGE UP (ref 32–36)
MCHC RBC-ENTMCNC: 36.7 GM/DL — HIGH (ref 32–36)
MCV RBC AUTO: 101.1 FL — HIGH (ref 80–100)
MCV RBC AUTO: 83.1 FL — SIGNIFICANT CHANGE UP (ref 80–100)
MCV RBC AUTO: 86.2 FL — SIGNIFICANT CHANGE UP (ref 80–100)
MCV RBC AUTO: 87.5 FL — SIGNIFICANT CHANGE UP (ref 80–100)
MCV RBC AUTO: 89.1 FL — SIGNIFICANT CHANGE UP (ref 80–100)
MCV RBC AUTO: 92.6 FL — SIGNIFICANT CHANGE UP (ref 80–100)
MCV RBC AUTO: 92.9 FL — SIGNIFICANT CHANGE UP (ref 80–100)
MCV RBC AUTO: 94.8 FL — SIGNIFICANT CHANGE UP (ref 80–100)
MCV RBC AUTO: 95.2 FL — SIGNIFICANT CHANGE UP (ref 80–100)
MCV RBC AUTO: 95.7 FL — SIGNIFICANT CHANGE UP (ref 80–100)
MCV RBC AUTO: 97.7 FL — SIGNIFICANT CHANGE UP (ref 80–100)
MONOCYTES # BLD AUTO: 1.06 K/UL — HIGH (ref 0–0.9)
MONOCYTES # BLD AUTO: 1.06 K/UL — HIGH (ref 0–0.9)
MONOCYTES # BLD AUTO: 1.09 K/UL — HIGH (ref 0–0.9)
MONOCYTES # BLD AUTO: 1.36 K/UL — HIGH (ref 0–0.9)
MONOCYTES # BLD AUTO: 1.39 K/UL — HIGH (ref 0–0.9)
MONOCYTES # BLD AUTO: 1.59 K/UL — HIGH (ref 0–0.9)
MONOCYTES # BLD AUTO: 1.72 K/UL — HIGH (ref 0–0.9)
MONOCYTES # BLD AUTO: 1.99 K/UL — HIGH (ref 0–0.9)
MONOCYTES NFR BLD AUTO: 3.5 % — SIGNIFICANT CHANGE UP (ref 2–14)
MONOCYTES NFR BLD AUTO: 5.4 % — SIGNIFICANT CHANGE UP (ref 2–14)
MONOCYTES NFR BLD AUTO: 5.9 % — SIGNIFICANT CHANGE UP (ref 2–14)
MONOCYTES NFR BLD AUTO: 6.1 % — SIGNIFICANT CHANGE UP (ref 2–14)
MONOCYTES NFR BLD AUTO: 7.2 % — SIGNIFICANT CHANGE UP (ref 2–14)
MONOCYTES NFR BLD AUTO: 8 % — SIGNIFICANT CHANGE UP (ref 2–14)
MONOCYTES NFR BLD AUTO: 8.2 % — SIGNIFICANT CHANGE UP (ref 2–14)
MONOCYTES NFR BLD AUTO: 8.3 % — SIGNIFICANT CHANGE UP (ref 2–14)
MRSA PCR RESULT.: SIGNIFICANT CHANGE UP
NEUTROPHILS # BLD AUTO: 12.13 K/UL — HIGH (ref 1.8–7.4)
NEUTROPHILS # BLD AUTO: 12.69 K/UL — HIGH (ref 1.8–7.4)
NEUTROPHILS # BLD AUTO: 14.93 K/UL — HIGH (ref 1.8–7.4)
NEUTROPHILS # BLD AUTO: 16.5 K/UL — HIGH (ref 1.8–7.4)
NEUTROPHILS # BLD AUTO: 19.83 K/UL — HIGH (ref 1.8–7.4)
NEUTROPHILS # BLD AUTO: 28.41 K/UL — HIGH (ref 1.8–7.4)
NEUTROPHILS # BLD AUTO: 50.25 K/UL — HIGH (ref 1.8–7.4)
NEUTROPHILS # BLD AUTO: 9.51 K/UL — HIGH (ref 1.8–7.4)
NEUTROPHILS NFR BLD AUTO: 73.5 % — SIGNIFICANT CHANGE UP (ref 43–77)
NEUTROPHILS NFR BLD AUTO: 77.3 % — HIGH (ref 43–77)
NEUTROPHILS NFR BLD AUTO: 82.8 % — HIGH (ref 43–77)
NEUTROPHILS NFR BLD AUTO: 82.9 % — HIGH (ref 43–77)
NEUTROPHILS NFR BLD AUTO: 83.4 % — HIGH (ref 43–77)
NEUTROPHILS NFR BLD AUTO: 84.6 % — HIGH (ref 43–77)
NEUTROPHILS NFR BLD AUTO: 86.8 % — HIGH (ref 43–77)
NEUTROPHILS NFR BLD AUTO: 88.3 % — HIGH (ref 43–77)
NITRITE UR-MCNC: NEGATIVE — SIGNIFICANT CHANGE UP
NRBC # BLD: 0 /100 WBCS — SIGNIFICANT CHANGE UP
NRBC # FLD: 0 K/UL — SIGNIFICANT CHANGE UP
OSMOLALITY SERPL: 260 MOSM/KG — LOW (ref 275–295)
OSMOLALITY UR: 490 MOSM/KG — SIGNIFICANT CHANGE UP (ref 50–1200)
PH UR: 6 — SIGNIFICANT CHANGE UP (ref 5–8)
PHOSPHATE SERPL-MCNC: 2.1 MG/DL — LOW (ref 2.5–4.5)
PHOSPHATE SERPL-MCNC: 2.1 MG/DL — LOW (ref 2.5–4.5)
PHOSPHATE SERPL-MCNC: 2.2 MG/DL — LOW (ref 2.5–4.5)
PHOSPHATE SERPL-MCNC: 2.3 MG/DL — LOW (ref 2.5–4.5)
PHOSPHATE SERPL-MCNC: 2.5 MG/DL — SIGNIFICANT CHANGE UP (ref 2.5–4.5)
PHOSPHATE SERPL-MCNC: 2.6 MG/DL — SIGNIFICANT CHANGE UP (ref 2.5–4.5)
PHOSPHATE SERPL-MCNC: 2.8 MG/DL — SIGNIFICANT CHANGE UP (ref 2.5–4.5)
PHOSPHATE SERPL-MCNC: 2.9 MG/DL — SIGNIFICANT CHANGE UP (ref 2.5–4.5)
PHOSPHATE SERPL-MCNC: 4.1 MG/DL — SIGNIFICANT CHANGE UP (ref 2.5–4.5)
PHOSPHATE SERPL-MCNC: 4.4 MG/DL — SIGNIFICANT CHANGE UP (ref 2.5–4.5)
PLATELET # BLD AUTO: 308 K/UL — SIGNIFICANT CHANGE UP (ref 150–400)
PLATELET # BLD AUTO: 325 K/UL — SIGNIFICANT CHANGE UP (ref 150–400)
PLATELET # BLD AUTO: 338 K/UL — SIGNIFICANT CHANGE UP (ref 150–400)
PLATELET # BLD AUTO: 344 K/UL — SIGNIFICANT CHANGE UP (ref 150–400)
PLATELET # BLD AUTO: 345 K/UL — SIGNIFICANT CHANGE UP (ref 150–400)
PLATELET # BLD AUTO: 384 K/UL — SIGNIFICANT CHANGE UP (ref 150–400)
PLATELET # BLD AUTO: 458 K/UL — HIGH (ref 150–400)
PLATELET # BLD AUTO: 471 K/UL — HIGH (ref 150–400)
PLATELET # BLD AUTO: 554 K/UL — HIGH (ref 150–400)
PLATELET # BLD AUTO: 563 K/UL — HIGH (ref 150–400)
PLATELET # BLD AUTO: 611 K/UL — HIGH (ref 150–400)
POTASSIUM SERPL-MCNC: 3.2 MMOL/L — LOW (ref 3.5–5.3)
POTASSIUM SERPL-MCNC: 3.2 MMOL/L — LOW (ref 3.5–5.3)
POTASSIUM SERPL-MCNC: 3.4 MMOL/L — LOW (ref 3.5–5.3)
POTASSIUM SERPL-MCNC: 3.5 MMOL/L — SIGNIFICANT CHANGE UP (ref 3.5–5.3)
POTASSIUM SERPL-MCNC: 3.5 MMOL/L — SIGNIFICANT CHANGE UP (ref 3.5–5.3)
POTASSIUM SERPL-MCNC: 3.6 MMOL/L — SIGNIFICANT CHANGE UP (ref 3.5–5.3)
POTASSIUM SERPL-MCNC: 3.6 MMOL/L — SIGNIFICANT CHANGE UP (ref 3.5–5.3)
POTASSIUM SERPL-MCNC: 3.7 MMOL/L — SIGNIFICANT CHANGE UP (ref 3.5–5.3)
POTASSIUM SERPL-MCNC: 3.7 MMOL/L — SIGNIFICANT CHANGE UP (ref 3.5–5.3)
POTASSIUM SERPL-MCNC: 3.8 MMOL/L — SIGNIFICANT CHANGE UP (ref 3.5–5.3)
POTASSIUM SERPL-MCNC: 4.3 MMOL/L — SIGNIFICANT CHANGE UP (ref 3.5–5.3)
POTASSIUM SERPL-MCNC: 4.8 MMOL/L — SIGNIFICANT CHANGE UP (ref 3.5–5.3)
POTASSIUM SERPL-SCNC: 3.2 MMOL/L — LOW (ref 3.5–5.3)
POTASSIUM SERPL-SCNC: 3.2 MMOL/L — LOW (ref 3.5–5.3)
POTASSIUM SERPL-SCNC: 3.4 MMOL/L — LOW (ref 3.5–5.3)
POTASSIUM SERPL-SCNC: 3.5 MMOL/L — SIGNIFICANT CHANGE UP (ref 3.5–5.3)
POTASSIUM SERPL-SCNC: 3.5 MMOL/L — SIGNIFICANT CHANGE UP (ref 3.5–5.3)
POTASSIUM SERPL-SCNC: 3.6 MMOL/L — SIGNIFICANT CHANGE UP (ref 3.5–5.3)
POTASSIUM SERPL-SCNC: 3.6 MMOL/L — SIGNIFICANT CHANGE UP (ref 3.5–5.3)
POTASSIUM SERPL-SCNC: 3.7 MMOL/L — SIGNIFICANT CHANGE UP (ref 3.5–5.3)
POTASSIUM SERPL-SCNC: 3.7 MMOL/L — SIGNIFICANT CHANGE UP (ref 3.5–5.3)
POTASSIUM SERPL-SCNC: 3.8 MMOL/L — SIGNIFICANT CHANGE UP (ref 3.5–5.3)
POTASSIUM SERPL-SCNC: 4.3 MMOL/L — SIGNIFICANT CHANGE UP (ref 3.5–5.3)
POTASSIUM SERPL-SCNC: 4.8 MMOL/L — SIGNIFICANT CHANGE UP (ref 3.5–5.3)
PROCALCITONIN SERPL-MCNC: 0.06 NG/ML — SIGNIFICANT CHANGE UP (ref 0.02–0.1)
PROCALCITONIN SERPL-MCNC: 0.06 NG/ML — SIGNIFICANT CHANGE UP (ref 0.02–0.1)
PROCALCITONIN SERPL-MCNC: 0.1 NG/ML — SIGNIFICANT CHANGE UP (ref 0.02–0.1)
PROCALCITONIN SERPL-MCNC: 0.14 NG/ML — HIGH (ref 0.02–0.1)
PROT SERPL-MCNC: 6 G/DL — SIGNIFICANT CHANGE UP (ref 6–8.3)
PROT SERPL-MCNC: 6.1 G/DL — SIGNIFICANT CHANGE UP (ref 6–8.3)
PROT SERPL-MCNC: 6.2 G/DL — SIGNIFICANT CHANGE UP (ref 6–8.3)
PROT SERPL-MCNC: 6.3 G/DL — SIGNIFICANT CHANGE UP (ref 6–8.3)
PROT SERPL-MCNC: 6.3 G/DL — SIGNIFICANT CHANGE UP (ref 6–8.3)
PROT SERPL-MCNC: 6.7 G/DL — SIGNIFICANT CHANGE UP (ref 6–8.3)
PROT SERPL-MCNC: 7 G/DL — SIGNIFICANT CHANGE UP (ref 6–8.3)
PROT UR-MCNC: ABNORMAL
RBC # BLD: 2.49 M/UL — LOW (ref 3.8–5.2)
RBC # BLD: 4.05 M/UL — SIGNIFICANT CHANGE UP (ref 3.8–5.2)
RBC # BLD: 4.29 M/UL — SIGNIFICANT CHANGE UP (ref 3.8–5.2)
RBC # BLD: 4.37 M/UL — SIGNIFICANT CHANGE UP (ref 3.8–5.2)
RBC # BLD: 4.37 M/UL — SIGNIFICANT CHANGE UP (ref 3.8–5.2)
RBC # BLD: 4.61 M/UL — SIGNIFICANT CHANGE UP (ref 3.8–5.2)
RBC # BLD: 4.66 M/UL — SIGNIFICANT CHANGE UP (ref 3.8–5.2)
RBC # BLD: 4.67 M/UL — SIGNIFICANT CHANGE UP (ref 3.8–5.2)
RBC # BLD: 4.75 M/UL — SIGNIFICANT CHANGE UP (ref 3.8–5.2)
RBC # BLD: 4.79 M/UL — SIGNIFICANT CHANGE UP (ref 3.8–5.2)
RBC # BLD: 5.22 M/UL — HIGH (ref 3.8–5.2)
RBC # FLD: 12 % — SIGNIFICANT CHANGE UP (ref 10.3–14.5)
RBC # FLD: 12.1 % — SIGNIFICANT CHANGE UP (ref 10.3–14.5)
RBC # FLD: 12.2 % — SIGNIFICANT CHANGE UP (ref 10.3–14.5)
RBC # FLD: 12.2 % — SIGNIFICANT CHANGE UP (ref 10.3–14.5)
RBC # FLD: 12.4 % — SIGNIFICANT CHANGE UP (ref 10.3–14.5)
RBC # FLD: 12.8 % — SIGNIFICANT CHANGE UP (ref 10.3–14.5)
RBC # FLD: 13.2 % — SIGNIFICANT CHANGE UP (ref 10.3–14.5)
RBC # FLD: 13.3 % — SIGNIFICANT CHANGE UP (ref 10.3–14.5)
RBC # FLD: 13.3 % — SIGNIFICANT CHANGE UP (ref 10.3–14.5)
RBC # FLD: 13.4 % — SIGNIFICANT CHANGE UP (ref 10.3–14.5)
RBC # FLD: 13.6 % — SIGNIFICANT CHANGE UP (ref 10.3–14.5)
RBC CASTS # UR COMP ASSIST: 11 /HPF — HIGH (ref 0–4)
S AUREUS DNA NOSE QL NAA+PROBE: SIGNIFICANT CHANGE UP
SODIUM SERPL-SCNC: 107 MMOL/L — CRITICAL LOW (ref 135–145)
SODIUM SERPL-SCNC: 112 MMOL/L — CRITICAL LOW (ref 135–145)
SODIUM SERPL-SCNC: 114 MMOL/L — CRITICAL LOW (ref 135–145)
SODIUM SERPL-SCNC: 114 MMOL/L — CRITICAL LOW (ref 135–145)
SODIUM SERPL-SCNC: 123 MMOL/L — LOW (ref 135–145)
SODIUM SERPL-SCNC: 127 MMOL/L — LOW (ref 135–145)
SODIUM SERPL-SCNC: 130 MMOL/L — LOW (ref 135–145)
SODIUM SERPL-SCNC: 131 MMOL/L — LOW (ref 135–145)
SODIUM SERPL-SCNC: 134 MMOL/L — LOW (ref 135–145)
SODIUM SERPL-SCNC: 140 MMOL/L — SIGNIFICANT CHANGE UP (ref 135–145)
SODIUM SERPL-SCNC: 144 MMOL/L — SIGNIFICANT CHANGE UP (ref 135–145)
SODIUM SERPL-SCNC: 146 MMOL/L — HIGH (ref 135–145)
SODIUM SERPL-SCNC: 147 MMOL/L — HIGH (ref 135–145)
SODIUM SERPL-SCNC: 149 MMOL/L — HIGH (ref 135–145)
SODIUM UR-SCNC: <20 MMOL/L — SIGNIFICANT CHANGE UP
SP GR SPEC: 1.03 — SIGNIFICANT CHANGE UP (ref 1–1.05)
SPECIMEN SOURCE: SIGNIFICANT CHANGE UP
SPECIMEN SOURCE: SIGNIFICANT CHANGE UP
UROBILINOGEN FLD QL: SIGNIFICANT CHANGE UP
VANCOMYCIN TROUGH SERPL-MCNC: 13.7 UG/ML — SIGNIFICANT CHANGE UP (ref 10–20)
VANCOMYCIN TROUGH SERPL-MCNC: 16.8 UG/ML — SIGNIFICANT CHANGE UP (ref 10–20)
VANCOMYCIN TROUGH SERPL-MCNC: 18.7 UG/ML — SIGNIFICANT CHANGE UP (ref 10–20)
VANCOMYCIN TROUGH SERPL-MCNC: 22.3 UG/ML — HIGH (ref 10–20)
WBC # BLD: 12.95 K/UL — HIGH (ref 3.8–10.5)
WBC # BLD: 14.63 K/UL — HIGH (ref 3.8–10.5)
WBC # BLD: 16.15 K/UL — HIGH (ref 3.8–10.5)
WBC # BLD: 16.4 K/UL — HIGH (ref 3.8–10.5)
WBC # BLD: 16.42 K/UL — HIGH (ref 3.8–10.5)
WBC # BLD: 17.47 K/UL — HIGH (ref 3.8–10.5)
WBC # BLD: 17.89 K/UL — HIGH (ref 3.8–10.5)
WBC # BLD: 19.91 K/UL — HIGH (ref 3.8–10.5)
WBC # BLD: 23.45 K/UL — HIGH (ref 3.8–10.5)
WBC # BLD: 31.85 K/UL — HIGH (ref 3.8–10.5)
WBC # BLD: 56.72 K/UL — CRITICAL HIGH (ref 3.8–10.5)
WBC # FLD AUTO: 12.95 K/UL — HIGH (ref 3.8–10.5)
WBC # FLD AUTO: 14.63 K/UL — HIGH (ref 3.8–10.5)
WBC # FLD AUTO: 16.15 K/UL — HIGH (ref 3.8–10.5)
WBC # FLD AUTO: 16.4 K/UL — HIGH (ref 3.8–10.5)
WBC # FLD AUTO: 16.42 K/UL — HIGH (ref 3.8–10.5)
WBC # FLD AUTO: 17.47 K/UL — HIGH (ref 3.8–10.5)
WBC # FLD AUTO: 17.89 K/UL — HIGH (ref 3.8–10.5)
WBC # FLD AUTO: 19.91 K/UL — HIGH (ref 3.8–10.5)
WBC # FLD AUTO: 23.45 K/UL — HIGH (ref 3.8–10.5)
WBC # FLD AUTO: 31.85 K/UL — HIGH (ref 3.8–10.5)
WBC # FLD AUTO: 56.72 K/UL — CRITICAL HIGH (ref 3.8–10.5)
WBC UR QL: 1 /HPF — SIGNIFICANT CHANGE UP (ref 0–5)

## 2022-01-01 PROCEDURE — 99221 1ST HOSP IP/OBS SF/LOW 40: CPT

## 2022-01-01 PROCEDURE — 99231 SBSQ HOSP IP/OBS SF/LOW 25: CPT | Mod: GC

## 2022-01-01 PROCEDURE — 99232 SBSQ HOSP IP/OBS MODERATE 35: CPT

## 2022-01-01 PROCEDURE — 71045 X-RAY EXAM CHEST 1 VIEW: CPT | Mod: 26

## 2022-01-01 PROCEDURE — 99223 1ST HOSP IP/OBS HIGH 75: CPT

## 2022-01-01 PROCEDURE — 99232 SBSQ HOSP IP/OBS MODERATE 35: CPT | Mod: GC

## 2022-01-01 RX ORDER — CEFEPIME 1 G/1
2000 INJECTION, POWDER, FOR SOLUTION INTRAMUSCULAR; INTRAVENOUS EVERY 12 HOURS
Refills: 0 | Status: DISCONTINUED | OUTPATIENT
Start: 2022-01-01 | End: 2022-01-01

## 2022-01-01 RX ORDER — CEFEPIME 1 G/1
INJECTION, POWDER, FOR SOLUTION INTRAMUSCULAR; INTRAVENOUS
Refills: 0 | Status: DISCONTINUED | OUTPATIENT
Start: 2022-01-01 | End: 2022-01-01

## 2022-01-01 RX ORDER — POTASSIUM CHLORIDE 20 MEQ
10 PACKET (EA) ORAL
Refills: 0 | Status: COMPLETED | OUTPATIENT
Start: 2022-01-01 | End: 2022-01-01

## 2022-01-01 RX ORDER — SODIUM CHLORIDE 5 G/100ML
500 INJECTION, SOLUTION INTRAVENOUS
Refills: 0 | Status: DISCONTINUED | OUTPATIENT
Start: 2022-01-01 | End: 2022-01-01

## 2022-01-01 RX ORDER — SODIUM,POTASSIUM PHOSPHATES 278-250MG
1 POWDER IN PACKET (EA) ORAL ONCE
Refills: 0 | Status: COMPLETED | OUTPATIENT
Start: 2022-01-01 | End: 2022-01-01

## 2022-01-01 RX ORDER — POTASSIUM CHLORIDE 20 MEQ
40 PACKET (EA) ORAL ONCE
Refills: 0 | Status: DISCONTINUED | OUTPATIENT
Start: 2022-01-01 | End: 2022-01-01

## 2022-01-01 RX ORDER — VANCOMYCIN HCL 1 G
750 VIAL (EA) INTRAVENOUS EVERY 12 HOURS
Refills: 0 | Status: DISCONTINUED | OUTPATIENT
Start: 2022-01-01 | End: 2022-01-01

## 2022-01-01 RX ORDER — SODIUM CHLORIDE 0.65 %
1 AEROSOL, SPRAY (ML) NASAL EVERY 4 HOURS
Refills: 0 | Status: DISCONTINUED | OUTPATIENT
Start: 2022-01-01 | End: 2022-01-01

## 2022-01-01 RX ORDER — SODIUM,POTASSIUM PHOSPHATES 278-250MG
1 POWDER IN PACKET (EA) ORAL
Refills: 0 | Status: COMPLETED | OUTPATIENT
Start: 2022-01-01 | End: 2022-01-01

## 2022-01-01 RX ORDER — VANCOMYCIN HCL 1 G
750 VIAL (EA) INTRAVENOUS ONCE
Refills: 0 | Status: COMPLETED | OUTPATIENT
Start: 2022-01-01 | End: 2022-01-01

## 2022-01-01 RX ORDER — CEFEPIME 1 G/1
2000 INJECTION, POWDER, FOR SOLUTION INTRAMUSCULAR; INTRAVENOUS ONCE
Refills: 0 | Status: COMPLETED | OUTPATIENT
Start: 2022-01-01 | End: 2022-01-01

## 2022-01-01 RX ORDER — VANCOMYCIN HCL 1 G
1000 VIAL (EA) INTRAVENOUS ONCE
Refills: 0 | Status: COMPLETED | OUTPATIENT
Start: 2022-01-01 | End: 2022-01-01

## 2022-01-01 RX ORDER — VANCOMYCIN HCL 1 G
VIAL (EA) INTRAVENOUS
Refills: 0 | Status: DISCONTINUED | OUTPATIENT
Start: 2022-01-01 | End: 2022-01-01

## 2022-01-01 RX ORDER — MIDODRINE HYDROCHLORIDE 2.5 MG/1
5 TABLET ORAL THREE TIMES A DAY
Refills: 0 | Status: DISCONTINUED | OUTPATIENT
Start: 2022-01-01 | End: 2022-01-01

## 2022-01-01 RX ORDER — VANCOMYCIN HCL 1 G
1000 VIAL (EA) INTRAVENOUS EVERY 12 HOURS
Refills: 0 | Status: DISCONTINUED | OUTPATIENT
Start: 2022-01-01 | End: 2022-01-01

## 2022-01-01 RX ADMIN — AMLODIPINE BESYLATE 5 MILLIGRAM(S): 2.5 TABLET ORAL at 06:25

## 2022-01-01 RX ADMIN — Medication 81 MILLIGRAM(S): at 12:02

## 2022-01-01 RX ADMIN — Medication 40 MILLIGRAM(S): at 21:30

## 2022-01-01 RX ADMIN — GABAPENTIN 200 MILLIGRAM(S): 400 CAPSULE ORAL at 17:39

## 2022-01-01 RX ADMIN — CEFEPIME 100 MILLIGRAM(S): 1 INJECTION, POWDER, FOR SOLUTION INTRAMUSCULAR; INTRAVENOUS at 05:16

## 2022-01-01 RX ADMIN — Medication 100 MILLIEQUIVALENT(S): at 00:40

## 2022-01-01 RX ADMIN — ENOXAPARIN SODIUM 80 MILLIGRAM(S): 100 INJECTION SUBCUTANEOUS at 17:32

## 2022-01-01 RX ADMIN — Medication 40 MILLIGRAM(S): at 21:39

## 2022-01-01 RX ADMIN — CEFEPIME 100 MILLIGRAM(S): 1 INJECTION, POWDER, FOR SOLUTION INTRAMUSCULAR; INTRAVENOUS at 06:56

## 2022-01-01 RX ADMIN — ENOXAPARIN SODIUM 80 MILLIGRAM(S): 100 INJECTION SUBCUTANEOUS at 17:39

## 2022-01-01 RX ADMIN — Medication 81 MILLIGRAM(S): at 12:23

## 2022-01-01 RX ADMIN — Medication 81 MILLIGRAM(S): at 14:03

## 2022-01-01 RX ADMIN — Medication 250 MILLIGRAM(S): at 06:52

## 2022-01-01 RX ADMIN — Medication 40 MILLIGRAM(S): at 05:18

## 2022-01-01 RX ADMIN — CHLORHEXIDINE GLUCONATE 1 APPLICATION(S): 213 SOLUTION TOPICAL at 11:36

## 2022-01-01 RX ADMIN — Medication 40 MILLIGRAM(S): at 05:50

## 2022-01-01 RX ADMIN — Medication 81 MILLIGRAM(S): at 13:05

## 2022-01-01 RX ADMIN — Medication 81 MILLIGRAM(S): at 12:22

## 2022-01-01 RX ADMIN — GABAPENTIN 200 MILLIGRAM(S): 400 CAPSULE ORAL at 05:45

## 2022-01-01 RX ADMIN — ENOXAPARIN SODIUM 80 MILLIGRAM(S): 100 INJECTION SUBCUTANEOUS at 17:54

## 2022-01-01 RX ADMIN — GABAPENTIN 200 MILLIGRAM(S): 400 CAPSULE ORAL at 05:27

## 2022-01-01 RX ADMIN — ENOXAPARIN SODIUM 80 MILLIGRAM(S): 100 INJECTION SUBCUTANEOUS at 17:40

## 2022-01-01 RX ADMIN — ENOXAPARIN SODIUM 80 MILLIGRAM(S): 100 INJECTION SUBCUTANEOUS at 05:35

## 2022-01-01 RX ADMIN — GABAPENTIN 200 MILLIGRAM(S): 400 CAPSULE ORAL at 06:25

## 2022-01-01 RX ADMIN — Medication 250 MILLIGRAM(S): at 10:46

## 2022-01-01 RX ADMIN — Medication 40 MILLIGRAM(S): at 05:23

## 2022-01-01 RX ADMIN — GABAPENTIN 200 MILLIGRAM(S): 400 CAPSULE ORAL at 05:35

## 2022-01-01 RX ADMIN — Medication 40 MILLIGRAM(S): at 05:25

## 2022-01-01 RX ADMIN — GABAPENTIN 200 MILLIGRAM(S): 400 CAPSULE ORAL at 05:50

## 2022-01-01 RX ADMIN — REMDESIVIR 500 MILLIGRAM(S): 5 INJECTION INTRAVENOUS at 21:17

## 2022-01-01 RX ADMIN — CHLORHEXIDINE GLUCONATE 1 APPLICATION(S): 213 SOLUTION TOPICAL at 12:23

## 2022-01-01 RX ADMIN — Medication 40 MILLIGRAM(S): at 05:29

## 2022-01-01 RX ADMIN — Medication 40 MILLIGRAM(S): at 21:31

## 2022-01-01 RX ADMIN — Medication 40 MILLIGRAM(S): at 05:19

## 2022-01-01 RX ADMIN — AMLODIPINE BESYLATE 5 MILLIGRAM(S): 2.5 TABLET ORAL at 05:11

## 2022-01-01 RX ADMIN — ENOXAPARIN SODIUM 80 MILLIGRAM(S): 100 INJECTION SUBCUTANEOUS at 18:06

## 2022-01-01 RX ADMIN — Medication 250 MILLIGRAM(S): at 02:54

## 2022-01-01 RX ADMIN — CHLORHEXIDINE GLUCONATE 1 APPLICATION(S): 213 SOLUTION TOPICAL at 13:44

## 2022-01-01 RX ADMIN — AMLODIPINE BESYLATE 5 MILLIGRAM(S): 2.5 TABLET ORAL at 05:19

## 2022-01-01 RX ADMIN — CHLORHEXIDINE GLUCONATE 1 APPLICATION(S): 213 SOLUTION TOPICAL at 12:07

## 2022-01-01 RX ADMIN — CEFEPIME 100 MILLIGRAM(S): 1 INJECTION, POWDER, FOR SOLUTION INTRAMUSCULAR; INTRAVENOUS at 18:30

## 2022-01-01 RX ADMIN — GABAPENTIN 200 MILLIGRAM(S): 400 CAPSULE ORAL at 05:18

## 2022-01-01 RX ADMIN — Medication 40 MILLIGRAM(S): at 05:11

## 2022-01-01 RX ADMIN — Medication 40 MILLIGRAM(S): at 17:39

## 2022-01-01 RX ADMIN — AMLODIPINE BESYLATE 5 MILLIGRAM(S): 2.5 TABLET ORAL at 05:50

## 2022-01-01 RX ADMIN — Medication 100 MILLIEQUIVALENT(S): at 11:10

## 2022-01-01 RX ADMIN — Medication 40 MILLIGRAM(S): at 05:10

## 2022-01-01 RX ADMIN — MIDODRINE HYDROCHLORIDE 5 MILLIGRAM(S): 2.5 TABLET ORAL at 18:09

## 2022-01-01 RX ADMIN — GABAPENTIN 200 MILLIGRAM(S): 400 CAPSULE ORAL at 17:16

## 2022-01-01 RX ADMIN — GABAPENTIN 200 MILLIGRAM(S): 400 CAPSULE ORAL at 17:53

## 2022-01-01 RX ADMIN — ENOXAPARIN SODIUM 80 MILLIGRAM(S): 100 INJECTION SUBCUTANEOUS at 18:02

## 2022-01-01 RX ADMIN — CEFEPIME 100 MILLIGRAM(S): 1 INJECTION, POWDER, FOR SOLUTION INTRAMUSCULAR; INTRAVENOUS at 18:02

## 2022-01-01 RX ADMIN — Medication 40 MILLIGRAM(S): at 22:40

## 2022-01-01 RX ADMIN — CHLORHEXIDINE GLUCONATE 1 APPLICATION(S): 213 SOLUTION TOPICAL at 14:03

## 2022-01-01 RX ADMIN — ENOXAPARIN SODIUM 80 MILLIGRAM(S): 100 INJECTION SUBCUTANEOUS at 06:23

## 2022-01-01 RX ADMIN — Medication 250 MILLIGRAM(S): at 21:23

## 2022-01-01 RX ADMIN — GABAPENTIN 200 MILLIGRAM(S): 400 CAPSULE ORAL at 05:59

## 2022-01-01 RX ADMIN — Medication 1 PACKET(S): at 09:31

## 2022-01-01 RX ADMIN — Medication 81 MILLIGRAM(S): at 12:24

## 2022-01-01 RX ADMIN — Medication 250 MILLIGRAM(S): at 14:30

## 2022-01-01 RX ADMIN — SODIUM CHLORIDE 50 MILLILITER(S): 5 INJECTION, SOLUTION INTRAVENOUS at 23:33

## 2022-01-01 RX ADMIN — Medication 81 MILLIGRAM(S): at 11:36

## 2022-01-01 RX ADMIN — ENOXAPARIN SODIUM 80 MILLIGRAM(S): 100 INJECTION SUBCUTANEOUS at 05:23

## 2022-01-01 RX ADMIN — ENOXAPARIN SODIUM 80 MILLIGRAM(S): 100 INJECTION SUBCUTANEOUS at 05:18

## 2022-01-01 RX ADMIN — CEFEPIME 100 MILLIGRAM(S): 1 INJECTION, POWDER, FOR SOLUTION INTRAMUSCULAR; INTRAVENOUS at 05:19

## 2022-01-01 RX ADMIN — ENOXAPARIN SODIUM 80 MILLIGRAM(S): 100 INJECTION SUBCUTANEOUS at 17:45

## 2022-01-01 RX ADMIN — GABAPENTIN 200 MILLIGRAM(S): 400 CAPSULE ORAL at 17:45

## 2022-01-01 RX ADMIN — Medication 40 MILLIGRAM(S): at 05:59

## 2022-01-01 RX ADMIN — Medication 81 MILLIGRAM(S): at 12:48

## 2022-01-01 RX ADMIN — CEFEPIME 100 MILLIGRAM(S): 1 INJECTION, POWDER, FOR SOLUTION INTRAMUSCULAR; INTRAVENOUS at 05:32

## 2022-01-01 RX ADMIN — CHLORHEXIDINE GLUCONATE 1 APPLICATION(S): 213 SOLUTION TOPICAL at 12:48

## 2022-01-01 RX ADMIN — CEFEPIME 100 MILLIGRAM(S): 1 INJECTION, POWDER, FOR SOLUTION INTRAMUSCULAR; INTRAVENOUS at 05:25

## 2022-01-01 RX ADMIN — AMLODIPINE BESYLATE 5 MILLIGRAM(S): 2.5 TABLET ORAL at 05:23

## 2022-01-01 RX ADMIN — CEFEPIME 100 MILLIGRAM(S): 1 INJECTION, POWDER, FOR SOLUTION INTRAMUSCULAR; INTRAVENOUS at 18:50

## 2022-01-01 RX ADMIN — Medication 40 MILLIGRAM(S): at 14:07

## 2022-01-01 RX ADMIN — CEFEPIME 100 MILLIGRAM(S): 1 INJECTION, POWDER, FOR SOLUTION INTRAMUSCULAR; INTRAVENOUS at 18:05

## 2022-01-01 RX ADMIN — Medication 40 MILLIGRAM(S): at 05:44

## 2022-01-01 RX ADMIN — Medication 250 MILLIGRAM(S): at 21:28

## 2022-01-01 RX ADMIN — AMLODIPINE BESYLATE 5 MILLIGRAM(S): 2.5 TABLET ORAL at 05:35

## 2022-01-01 RX ADMIN — GABAPENTIN 200 MILLIGRAM(S): 400 CAPSULE ORAL at 05:19

## 2022-01-01 RX ADMIN — Medication 100 MILLIEQUIVALENT(S): at 22:38

## 2022-01-01 RX ADMIN — ENOXAPARIN SODIUM 80 MILLIGRAM(S): 100 INJECTION SUBCUTANEOUS at 17:41

## 2022-01-01 RX ADMIN — ENOXAPARIN SODIUM 80 MILLIGRAM(S): 100 INJECTION SUBCUTANEOUS at 18:30

## 2022-01-01 RX ADMIN — CHLORHEXIDINE GLUCONATE 1 APPLICATION(S): 213 SOLUTION TOPICAL at 11:33

## 2022-01-01 RX ADMIN — Medication 250 MILLIGRAM(S): at 18:39

## 2022-01-01 RX ADMIN — Medication 40 MILLIGRAM(S): at 13:26

## 2022-01-01 RX ADMIN — GABAPENTIN 200 MILLIGRAM(S): 400 CAPSULE ORAL at 18:31

## 2022-01-01 RX ADMIN — AMLODIPINE BESYLATE 5 MILLIGRAM(S): 2.5 TABLET ORAL at 05:44

## 2022-01-01 RX ADMIN — CEFEPIME 100 MILLIGRAM(S): 1 INJECTION, POWDER, FOR SOLUTION INTRAMUSCULAR; INTRAVENOUS at 18:07

## 2022-01-01 RX ADMIN — GABAPENTIN 200 MILLIGRAM(S): 400 CAPSULE ORAL at 17:41

## 2022-01-01 RX ADMIN — CHLORHEXIDINE GLUCONATE 1 APPLICATION(S): 213 SOLUTION TOPICAL at 12:15

## 2022-01-01 RX ADMIN — CEFEPIME 100 MILLIGRAM(S): 1 INJECTION, POWDER, FOR SOLUTION INTRAMUSCULAR; INTRAVENOUS at 07:06

## 2022-01-01 RX ADMIN — Medication 250 MILLIGRAM(S): at 06:03

## 2022-01-01 RX ADMIN — CHLORHEXIDINE GLUCONATE 1 APPLICATION(S): 213 SOLUTION TOPICAL at 14:08

## 2022-01-01 RX ADMIN — GABAPENTIN 200 MILLIGRAM(S): 400 CAPSULE ORAL at 05:29

## 2022-01-01 RX ADMIN — CEFEPIME 100 MILLIGRAM(S): 1 INJECTION, POWDER, FOR SOLUTION INTRAMUSCULAR; INTRAVENOUS at 08:13

## 2022-01-01 RX ADMIN — Medication 81 MILLIGRAM(S): at 11:34

## 2022-01-01 RX ADMIN — CHLORHEXIDINE GLUCONATE 1 APPLICATION(S): 213 SOLUTION TOPICAL at 17:44

## 2022-01-01 RX ADMIN — GABAPENTIN 200 MILLIGRAM(S): 400 CAPSULE ORAL at 17:32

## 2022-01-01 RX ADMIN — Medication 6 MILLIGRAM(S): at 06:24

## 2022-01-01 RX ADMIN — ENOXAPARIN SODIUM 80 MILLIGRAM(S): 100 INJECTION SUBCUTANEOUS at 05:50

## 2022-01-01 RX ADMIN — ENOXAPARIN SODIUM 80 MILLIGRAM(S): 100 INJECTION SUBCUTANEOUS at 05:03

## 2022-01-01 RX ADMIN — CEFEPIME 100 MILLIGRAM(S): 1 INJECTION, POWDER, FOR SOLUTION INTRAMUSCULAR; INTRAVENOUS at 17:44

## 2022-01-01 RX ADMIN — Medication 40 MILLIGRAM(S): at 05:35

## 2022-01-01 RX ADMIN — CEFEPIME 100 MILLIGRAM(S): 1 INJECTION, POWDER, FOR SOLUTION INTRAMUSCULAR; INTRAVENOUS at 20:37

## 2022-01-01 RX ADMIN — CEFEPIME 100 MILLIGRAM(S): 1 INJECTION, POWDER, FOR SOLUTION INTRAMUSCULAR; INTRAVENOUS at 05:10

## 2022-01-01 RX ADMIN — Medication 40 MILLIGRAM(S): at 13:43

## 2022-01-01 RX ADMIN — SODIUM CHLORIDE 30 MILLILITER(S): 5 INJECTION, SOLUTION INTRAVENOUS at 19:12

## 2022-01-01 RX ADMIN — Medication 81 MILLIGRAM(S): at 13:43

## 2022-01-01 RX ADMIN — Medication 81 MILLIGRAM(S): at 12:39

## 2022-01-01 RX ADMIN — AMLODIPINE BESYLATE 5 MILLIGRAM(S): 2.5 TABLET ORAL at 05:04

## 2022-01-01 RX ADMIN — GABAPENTIN 200 MILLIGRAM(S): 400 CAPSULE ORAL at 18:07

## 2022-01-01 RX ADMIN — Medication 40 MILLIGRAM(S): at 05:26

## 2022-01-01 RX ADMIN — Medication 1 PACKET(S): at 17:43

## 2022-01-01 RX ADMIN — Medication 100 MILLIEQUIVALENT(S): at 23:40

## 2022-01-01 RX ADMIN — Medication 40 MILLIGRAM(S): at 12:24

## 2022-01-01 RX ADMIN — Medication 40 MILLIGRAM(S): at 05:05

## 2022-01-01 RX ADMIN — Medication 250 MILLIGRAM(S): at 05:17

## 2022-01-01 RX ADMIN — Medication 250 MILLIGRAM(S): at 14:03

## 2022-01-01 RX ADMIN — Medication 40 MILLIGRAM(S): at 21:13

## 2022-01-01 RX ADMIN — Medication 81 MILLIGRAM(S): at 14:07

## 2022-01-01 RX ADMIN — Medication 250 MILLIGRAM(S): at 20:37

## 2022-01-01 RX ADMIN — MIDODRINE HYDROCHLORIDE 5 MILLIGRAM(S): 2.5 TABLET ORAL at 05:26

## 2022-01-01 RX ADMIN — Medication 250 MILLIGRAM(S): at 22:20

## 2022-01-01 RX ADMIN — ENOXAPARIN SODIUM 80 MILLIGRAM(S): 100 INJECTION SUBCUTANEOUS at 05:28

## 2022-01-01 RX ADMIN — CEFEPIME 100 MILLIGRAM(S): 1 INJECTION, POWDER, FOR SOLUTION INTRAMUSCULAR; INTRAVENOUS at 05:51

## 2022-01-01 RX ADMIN — SENNA PLUS 2 TABLET(S): 8.6 TABLET ORAL at 22:40

## 2022-01-01 RX ADMIN — AMLODIPINE BESYLATE 5 MILLIGRAM(S): 2.5 TABLET ORAL at 05:30

## 2022-01-01 RX ADMIN — Medication 40 MILLIGRAM(S): at 21:23

## 2022-01-01 RX ADMIN — Medication 250 MILLIGRAM(S): at 01:43

## 2022-01-01 RX ADMIN — ENOXAPARIN SODIUM 80 MILLIGRAM(S): 100 INJECTION SUBCUTANEOUS at 05:29

## 2022-01-01 RX ADMIN — GABAPENTIN 200 MILLIGRAM(S): 400 CAPSULE ORAL at 18:09

## 2022-01-01 RX ADMIN — Medication 1 SPRAY(S): at 12:30

## 2022-01-01 RX ADMIN — GABAPENTIN 200 MILLIGRAM(S): 400 CAPSULE ORAL at 05:23

## 2022-01-01 RX ADMIN — GABAPENTIN 200 MILLIGRAM(S): 400 CAPSULE ORAL at 18:02

## 2022-01-01 RX ADMIN — SENNA PLUS 2 TABLET(S): 8.6 TABLET ORAL at 21:25

## 2022-01-01 RX ADMIN — Medication 100 MILLIEQUIVALENT(S): at 08:38

## 2022-01-01 RX ADMIN — ENOXAPARIN SODIUM 80 MILLIGRAM(S): 100 INJECTION SUBCUTANEOUS at 06:33

## 2022-01-01 RX ADMIN — CHLORHEXIDINE GLUCONATE 1 APPLICATION(S): 213 SOLUTION TOPICAL at 12:21

## 2022-01-01 RX ADMIN — Medication 40 MILLIGRAM(S): at 13:10

## 2022-01-01 RX ADMIN — Medication 40 MILLIGRAM(S): at 21:25

## 2022-01-01 RX ADMIN — CEFEPIME 100 MILLIGRAM(S): 1 INJECTION, POWDER, FOR SOLUTION INTRAMUSCULAR; INTRAVENOUS at 17:40

## 2022-01-01 RX ADMIN — CEFEPIME 100 MILLIGRAM(S): 1 INJECTION, POWDER, FOR SOLUTION INTRAMUSCULAR; INTRAVENOUS at 17:51

## 2022-01-01 RX ADMIN — Medication 250 MILLIGRAM(S): at 11:07

## 2022-01-01 RX ADMIN — Medication 250 MILLIGRAM(S): at 18:11

## 2022-01-01 RX ADMIN — Medication 40 MILLIGRAM(S): at 18:09

## 2022-01-01 RX ADMIN — SENNA PLUS 2 TABLET(S): 8.6 TABLET ORAL at 21:18

## 2022-01-01 RX ADMIN — Medication 40 MILLIGRAM(S): at 13:42

## 2022-01-01 RX ADMIN — ENOXAPARIN SODIUM 80 MILLIGRAM(S): 100 INJECTION SUBCUTANEOUS at 05:44

## 2022-01-01 RX ADMIN — Medication 40 MILLIGRAM(S): at 06:25

## 2022-01-01 RX ADMIN — AMLODIPINE BESYLATE 5 MILLIGRAM(S): 2.5 TABLET ORAL at 05:18

## 2022-01-01 RX ADMIN — CEFEPIME 100 MILLIGRAM(S): 1 INJECTION, POWDER, FOR SOLUTION INTRAMUSCULAR; INTRAVENOUS at 17:32

## 2022-01-01 RX ADMIN — Medication 1 PACKET(S): at 06:55

## 2022-01-01 RX ADMIN — Medication 100 MILLIEQUIVALENT(S): at 09:49

## 2022-01-01 RX ADMIN — GABAPENTIN 200 MILLIGRAM(S): 400 CAPSULE ORAL at 17:31

## 2022-01-01 RX ADMIN — CEFEPIME 100 MILLIGRAM(S): 1 INJECTION, POWDER, FOR SOLUTION INTRAMUSCULAR; INTRAVENOUS at 05:45

## 2022-01-01 RX ADMIN — ENOXAPARIN SODIUM 80 MILLIGRAM(S): 100 INJECTION SUBCUTANEOUS at 17:16

## 2022-01-01 RX ADMIN — ENOXAPARIN SODIUM 80 MILLIGRAM(S): 100 INJECTION SUBCUTANEOUS at 05:58

## 2022-01-01 RX ADMIN — Medication 40 MILLIGRAM(S): at 12:37

## 2022-01-01 RX ADMIN — GABAPENTIN 200 MILLIGRAM(S): 400 CAPSULE ORAL at 05:10

## 2022-01-01 RX ADMIN — GABAPENTIN 200 MILLIGRAM(S): 400 CAPSULE ORAL at 05:04

## 2022-01-01 RX ADMIN — CHLORHEXIDINE GLUCONATE 1 APPLICATION(S): 213 SOLUTION TOPICAL at 13:05

## 2022-01-01 RX ADMIN — ENOXAPARIN SODIUM 80 MILLIGRAM(S): 100 INJECTION SUBCUTANEOUS at 18:09

## 2022-01-01 RX ADMIN — Medication 250 MILLIGRAM(S): at 22:33

## 2022-01-01 NOTE — PROGRESS NOTE ADULT - ASSESSMENT
LABS:                        13.1   12.95 )-----------( 611      ( 01 Jan 2022 07:37 )             41.9   01-01    149<H>  |  108<H>  |  29<H>  ----------------------------<  97  3.6   |  27  |  0.49<L>    Ca    9.3      01 Jan 2022 07:37  Phos  2.5     01-01  Mg     2.50     01-01    TPro  6.2  /  Alb  3.2<L>  /  TBili  0.5  /  DBili  x   /  AST  23  /  ALT  28  /  AlkPhos  80  01-01      CAPILLARY BLOOD GLUCOSE    POCT Blood Glucose.: 112 mg/dL (01 Jan 2022 18:02)  POCT Blood Glucose.: 155 mg/dL (01 Jan 2022 12:53)  POCT Blood Glucose.: 139 mg/dL (01 Jan 2022 12:22)  POCT Blood Glucose.: 108 mg/dL (01 Jan 2022 06:36)  POCT Blood Glucose.: 102 mg/dL (01 Jan 2022 01:11)    CBC Full  -  ( 01 Jan 2022 07:37 )  WBC Count : 12.95 K/uL  RBC Count : 4.29 M/uL  Hemoglobin : 13.1 g/dL  Hematocrit : 41.9 %  Platelet Count - Automated : 611 K/uL  Mean Cell Volume : 97.7 fL  Mean Cell Hemoglobin : 30.5 pg  Mean Cell Hemoglobin Concentration : 31.3 gm/dL  Auto Neutrophil # : 9.51 K/uL  Auto Lymphocyte # : 1.88 K/uL  Auto Monocyte # : 1.06 K/uL  Auto Eosinophil # : 0.25 K/uL  Auto Basophil # : 0.04 K/uL  Auto Neutrophil % : 73.5 %  Auto Lymphocyte % : 14.5 %  Auto Monocyte % : 8.2 %  Auto Eosinophil % : 1.9 %  Auto Basophil % : 0.3 %        HEALTH ISSUES - PROBLEM Dx:  Acute respiratory failure with hypoxia    Pneumonia due to COVID-19 virus    Hypertension    Lower extremity edema    Need for prophylactic measure    Problem/Plan - 1:  ·  Problem: Acute respiratory failure with hypoxia.   ·  Plan: VAPS  EPAP: 9  FIO2: 100%. O2 Sat.: 92-97 %  APRV Biphasic mode: 25/13  TV: 425/542     Problem/Plan - 2:  ·  Problem: Pneumonia due to COVID-19 virus.   ·  Plan: DEXAMETHASONE  REMDESIVIR  LOVENOX  ALBUTEROL.     Problem/Plan - 3:  ·  Problem: Hypertension.   ·  Plan: AMLODIPINE  LASIX  KCL.     Problem/Plan - 4:  ·  Problem: Lower extremity edema.   ·  Plan: LASIX  KCL.     Problem/Plan - 5:  ·  Problem: Need for prophylactic measure.   ·  Plan: As per orders.

## 2022-01-01 NOTE — PROGRESS NOTE ADULT - SUBJECTIVE AND OBJECTIVE BOX
RENNY PESRAUD Female  Patient is a 76y old  Female who presents with a chief complaint of + Acute hypoxic respiratory failure 2/2 COVID pneumonia (31 Dec 2021 22:43)    INTERVAL HPI/OVERNIGHT EVENTS:  HPI:  76 year old Female PMHx HTN, Asthma as a child (No hx of intubations/hospitalizations, Venous insufficiency, Lymphedema  Osteoarthritis presenting with fatigue, productive cough, and dyspnea. Patient report she was exposed to sick contact-  + COVID x 20 days ago. Patients  is currently admitted at [facility]. Patient states she did not have any symptoms however her sister who works as a nurse noted pt appeared dyspneic check Sp02 at home 66% She sent a rapid test today that came back + COVID. Notified PCP - Dr. Cherise Amos who recommended her to go to the hospital. Pt reports she took Zpak on 12/17 as a preventative measure and completed course. Endorses some decreased appetite and SOB now improved at rest on HFNC. Patient is unvaccinated, denies having COVID in the past. Has hx of chronic LE swelling is on PO lasix.  Denies hx of PE/DVT, fever, body aches, headache, vision change, chest pain, abdominal pain, nausea, vomiting, melena, hematochezia, dysuria, urinary frequency, calf pain.     In ED vitals: Temp 98F HR 75 /83 SP02 initially 70s on NC currently 95% on 15L NRB - escalated to HFNC  Labs- Ddimer - 532 Procalcitonin - 0.08 Ferritin 503  Patient is s/p PO tylenol and IV Decadron 6mg x1.  (23 Dec 2021 20:08)      FAMILY HISTORY:  Family history of heart disease (Father)      Allergies    Bactrim (Unknown)  Betadine (Unknown)  latex (Unknown)  sulfa drugs (Unknown)    Intolerances      MEDICATIONS  (STANDING):  amLODIPine   Tablet 5 milliGRAM(s) Oral daily  aspirin enteric coated 81 milliGRAM(s) Oral daily  chlorhexidine 2% Cloths 1 Application(s) Topical daily  dextrose 5%. 1000 milliLiter(s) (50 mL/Hr) IV Continuous <Continuous>  enoxaparin Injectable 80 milliGRAM(s) SubCutaneous every 12 hours  furosemide    Tablet 40 milliGRAM(s) Oral daily  gabapentin 200 milliGRAM(s) Oral two times a day  senna 2 Tablet(s) Oral at bedtime    MEDICATIONS  (PRN):  acetaminophen     Tablet .. 650 milliGRAM(s) Oral every 4 hours PRN Temp greater or equal to 38C (100.4F), Mild Pain (1 - 3), Moderate Pain (4 - 6)  ALBUTerol    90 MICROgram(s) HFA Inhaler 2 Puff(s) Inhalation every 4 hours PRN Shortness of Breath and/or Wheezing  benzonatate 200 milliGRAM(s) Oral three times a day PRN Cough  guaifenesin/dextromethorphan Oral Liquid 10 milliLiter(s) Oral every 4 hours PRN Cough    REVIEW OF SYSTEMS:  CONSTITUTIONAL: No fever, weight loss, or fatigue  RESPIRATORY: No cough, wheezing, chills or hemoptysis; No shortness of breath  CARDIOVASCULAR: No chest pain, palpitations, dizziness, or leg swelling  GASTROINTESTINAL: No abdominal or epigastric pain. No nausea, vomiting, or hematemesis; No diarrhea or constipation. No melena or hematochezia.  NEUROLOGICAL: No headaches, memory loss, loss of strength, numbness, or tremors  MUSCULOSKELETAL: No joint pain or swelling; No muscle, back, or extremity pain  SKIN: No rashes or lesions    Vital Signs Last 24 Hrs  T(C): 36.6 (01 Jan 2022 17:14), Max: 37 (01 Jan 2022 06:20)  T(F): 97.8 (01 Jan 2022 17:14), Max: 98.6 (01 Jan 2022 06:20)  HR: 57 (01 Jan 2022 19:57) (54 - 64)  BP: 119/71 (01 Jan 2022 17:14) (112/68 - 119/71)  BP(mean): --  RR: 20 (01 Jan 2022 17:14) (20 - 20)  SpO2: 94% (01 Jan 2022 19:57) (91% - 99%)    T(C): 36.6 (01-01-22 @ 17:14), Max: 37 (01-01-22 @ 06:20)  HR: 57 (01-01-22 @ 19:57) (54 - 64)  BP: 119/71 (01-01-22 @ 17:14) (112/68 - 119/71)  RR: 20 (01-01-22 @ 17:14) (20 - 20)  SpO2: 94% (01-01-22 @ 19:57) (91% - 99%)    I&O's Summary    31 Dec 2021 07:01  -  01 Jan 2022 07:00  --------------------------------------------------------  IN: 95 mL / OUT: 1000 mL / NET: -905 mL    01 Jan 2022 07:01  -  01 Jan 2022 22:37  --------------------------------------------------------  IN: 0 mL / OUT: 500 mL / NET: -500 mL    PHYSICAL EXAM:  GENERAL: NAD, well-groomed, well-developed  HEAD:  Atraumatic, Normocephalic  EYES: EOMI, PERRLA, conjunctiva and sclera clear  NECK: Supple, No JVD  NERVOUS SYSTEM:  Alert & Oriented X3, No gross focal deficits  CHEST/LUNG: Clear to percussion bilaterally; No rales, rhonchi, wheezing, or rubs  HEART: Regular rate and rhythm; S1S2 present  ABDOMEN: Soft, Nontender, Nondistended; Bowel sounds present  EXTREMITIES:  No clubbing, cyanosis, or edema  SKIN: No rashes or lesions

## 2022-01-02 NOTE — PROGRESS NOTE ADULT - SUBJECTIVE AND OBJECTIVE BOX
RENNY PERSAUD Female  Patient is a 76y old  Female who presents with a chief complaint of + Acute hypoxic respiratory failure 2/2 COVID pneumonia (01 Jan 2022 22:37)      INTERVAL HPI/OVERNIGHT EVENTS:  HPI:  76 year old Female PMHx HTN, Asthma as a child (No hx of intubations/hospitalizations, Venous insufficiency, Lymphedema  Osteoarthritis presenting with fatigue, productive cough, and dyspnea. Patient report she was exposed to sick contact-  + COVID x 20 days ago. Patients  is currently admitted at [facility]. Patient states she did not have any symptoms however her sister who works as a nurse noted pt appeared dyspneic check Sp02 at home 66% She sent a rapid test today that came back + COVID. Notified PCP - Dr. Cherise Amos who recommended her to go to the hospital. Pt reports she took Zpak on 12/17 as a preventative measure and completed course. Endorses some decreased appetite and SOB now improved at rest on HFNC. Patient is unvaccinated, denies having COVID in the past. Has hx of chronic LE swelling is on PO lasix.  Denies hx of PE/DVT, fever, body aches, headache, vision change, chest pain, abdominal pain, nausea, vomiting, melena, hematochezia, dysuria, urinary frequency, calf pain.     In ED vitals: Temp 98F HR 75 /83 SP02 initially 70s on NC currently 95% on 15L NRB - escalated to HFNC  Labs- Ddimer - 532 Procalcitonin - 0.08 Ferritin 503  Patient is s/p PO tylenol and IV Decadron 6mg x1.  (23 Dec 2021 20:08)      FAMILY HISTORY:  Family history of heart disease (Father)      Allergies    Bactrim (Unknown)  Betadine (Unknown)  latex (Unknown)  sulfa drugs (Unknown)    Intolerances      MEDICATIONS  (STANDING):  amLODIPine   Tablet 5 milliGRAM(s) Oral daily  aspirin enteric coated 81 milliGRAM(s) Oral daily  chlorhexidine 2% Cloths 1 Application(s) Topical daily  dextrose 5%. 1000 milliLiter(s) (50 mL/Hr) IV Continuous <Continuous>  enoxaparin Injectable 80 milliGRAM(s) SubCutaneous every 12 hours  furosemide    Tablet 40 milliGRAM(s) Oral daily  gabapentin 200 milliGRAM(s) Oral two times a day  senna 2 Tablet(s) Oral at bedtime    MEDICATIONS  (PRN):  acetaminophen     Tablet .. 650 milliGRAM(s) Oral every 4 hours PRN Temp greater or equal to 38C (100.4F), Mild Pain (1 - 3), Moderate Pain (4 - 6)  ALBUTerol    90 MICROgram(s) HFA Inhaler 2 Puff(s) Inhalation every 4 hours PRN Shortness of Breath and/or Wheezing  benzonatate 200 milliGRAM(s) Oral three times a day PRN Cough  guaifenesin/dextromethorphan Oral Liquid 10 milliLiter(s) Oral every 4 hours PRN Cough    REVIEW OF SYSTEMS:  CONSTITUTIONAL: No fever, weight loss, or fatigue  RESPIRATORY: No cough, wheezing, chills or hemoptysis; No shortness of breath  CARDIOVASCULAR: No chest pain, palpitations, dizziness, or leg swelling  GASTROINTESTINAL: No abdominal or epigastric pain. No nausea, vomiting, or hematemesis; No diarrhea or constipation. No melena or hematochezia.  NEUROLOGICAL: No headaches, memory loss, loss of strength, numbness, or tremors  MUSCULOSKELETAL: No joint pain or swelling; No muscle, back, or extremity pain  SKIN: No rashes or lesions    Vital Signs Last 24 Hrs  T(C): 36.7 (02 Jan 2022 21:51), Max: 37 (02 Jan 2022 05:02)  T(F): 98 (02 Jan 2022 21:51), Max: 98.6 (02 Jan 2022 05:02)  HR: 86 (02 Jan 2022 21:51) (61 - 100)  BP: 125/69 (02 Jan 2022 21:51) (124/80 - 126/71)  BP(mean): 92 (02 Jan 2022 11:45) (92 - 92)  RR: 20 (02 Jan 2022 21:51) (18 - 22)  SpO2: 92% (02 Jan 2022 21:51) (90% - 97%)    T(C): 36.7 (01-02-22 @ 21:51), Max: 37 (01-02-22 @ 05:02)  HR: 86 (01-02-22 @ 21:51) (61 - 100)  BP: 125/69 (01-02-22 @ 21:51) (124/80 - 126/71)  RR: 20 (01-02-22 @ 21:51) (18 - 22)  SpO2: 92% (01-02-22 @ 21:51) (90% - 97%)      I&O's Summary    01 Jan 2022 07:01  -  02 Jan 2022 07:00  --------------------------------------------------------  IN: 0 mL / OUT: 1400 mL / NET: -1400 mL    PHYSICAL EXAM:  GENERAL: NAD, well-groomed, well-developed  HEAD:  Atraumatic, Normocephalic  EYES: EOMI, PERRLA, conjunctiva and sclera clear  NECK: Supple, No JVD  NERVOUS SYSTEM:  Alert & Oriented X3, No gross focal deficits  CHEST/LUNG: Clear to percussion bilaterally; No rales, rhonchi, wheezing, or rubs  HEART: Regular rate and rhythm; S1S2 present  ABDOMEN: Soft, Nontender, Nondistended; Bowel sounds present  EXTREMITIES:  No clubbing, cyanosis, or edema  SKIN: No rashes or lesions      HEALTH ISSUES - PROBLEM Dx:  Acute respiratory failure with hypoxia    Pneumonia due to COVID-19 virus    Hypertension    Lower extremity edema    Need for prophylactic measure

## 2022-01-02 NOTE — PROGRESS NOTE ADULT - ASSESSMENT
LABS:                        13.4   16.42 )-----------( 563      ( 02 Jan 2022 07:06 )             41.6   01-02    147<H>  |  105  |  28<H>  ----------------------------<  93  3.4<L>   |  26  |  0.45<L>    Ca    9.3      02 Jan 2022 07:06  Phos  2.5     01-02  Mg     2.30     01-02    TPro  6.1  /  Alb  3.1<L>  /  TBili  0.5  /  DBili  x   /  AST  24  /  ALT  27  /  AlkPhos  79  01-02  CAPILLARY BLOOD GLUCOSE    POCT Blood Glucose.: 89 mg/dL (02 Jan 2022 17:48)  POCT Blood Glucose.: 120 mg/dL (02 Jan 2022 12:28)  POCT Blood Glucose.: 104 mg/dL (02 Jan 2022 05:02)  POCT Blood Glucose.: 102 mg/dL (01 Jan 2022 22:53)        CBC Full  -  ( 02 Jan 2022 07:06 )  WBC Count : 16.42 K/uL  RBC Count : 4.37 M/uL  Hemoglobin : 13.4 g/dL  Hematocrit : 41.6 %  Platelet Count - Automated : 563 K/uL  Mean Cell Volume : 95.2 fL  Mean Cell Hemoglobin : 30.7 pg  Mean Cell Hemoglobin Concentration : 32.2 gm/dL  Auto Neutrophil # : 12.69 K/uL  Auto Lymphocyte # : 1.92 K/uL  Auto Monocyte # : 1.36 K/uL  Auto Eosinophil # : 0.19 K/uL  Auto Basophil # : 0.04 K/uL  Auto Neutrophil % : 77.3 %  Auto Lymphocyte % : 11.7 %  Auto Monocyte % : 8.3 %  Auto Eosinophil % : 1.2 %  Auto Basophil % : 0.2 %    HEALTH ISSUES - PROBLEM Dx:  Acute respiratory failure with hypoxia    Pneumonia due to COVID-19 virus    Hypertension    Lower extremity edema    Need for prophylactic measure    Problem/Plan - 1:  ·  Problem: Acute respiratory failure with hypoxia.   ·  Plan: HFlNC  55 L/min.  FIO2: 100%  FIO2: 100%. O2 Sat.: 92-95 %     Problem/Plan - 2:  ·  Problem: Pneumonia due to COVID-19 virus.   ·  Plan: DEXAMETHASONE  REMDESIVIR  LOVENOX  ALBUTEROL.     Problem/Plan - 3:  ·  Problem: Hypertension.   ·  Plan: AMLODIPINE  LASIX  KCL.     Problem/Plan - 4:  ·  Problem: Lower extremity edema.   ·  Plan: LASIX  KCL.     Problem/Plan - 5:  ·  Problem: Need for prophylactic measure.   ·  Plan: As per orders.

## 2022-01-02 NOTE — CHART NOTE - NSCHARTNOTEFT_GEN_A_CORE
Pt satting well 97-98% on AVAPS, will call respiratory and trial on High flow, will monitor O2 sat. Will check Speech and swallow as pt has been npo.

## 2022-01-03 NOTE — CHART NOTE - NSCHARTNOTEFT_GEN_A_CORE
Patient was signed out on high flow, informed by RN patient desated to low 80%. Respiratory therapist was called and patient was placed back to AVAP.  Informed .   Patient was seen bedside no chest pain, palpitation, worsening SOB.   Will continue to monitor.

## 2022-01-03 NOTE — SWALLOW BEDSIDE ASSESSMENT ADULT - ADDITIONAL RECOMMENDATIONS
1. Reconsult this department as patient continues to medically optimizes. 2. Continue to wean patient off AVAPs at MD's discretion 1. Reconsult this department as patient continues to medically optimize and is able to participate in clinical swallow assessment. 2. Continue to wean patient off AVAPs at MD's discretion

## 2022-01-03 NOTE — DIETITIAN INITIAL EVALUATION ADULT. - PROBLEM SELECTOR PLAN 2
+ COVID on 15L NRB now escalated to HFNC  - inflammatory markers sent  - procalcitonin wnl  - Ferritin 503    -CXR showing: Diffuse bilateral patchy opacities, most predominantly within the lower lobes likely 2/2 COVID  - pt reports she finished 5 day course of Zpak last week  - monitor off abx for now   - albuterol prn shortness of breath/wheezing  - incentive spirometry  - tessalon pearles prn   - pt is s/p Decadron 6mg x 1 in ER  - Continue IV Decadron x 9 day  - Start Remdesivir x 5 day

## 2022-01-03 NOTE — CHART NOTE - NSCHARTNOTEFT_GEN_A_CORE
Discussed with  procal and WBC. Informed to send sepsis W/u. Start patient on abx, started on vanco and cefepime.   Instructed to start on solumederol q8 40 mg IV. Informed to Re-adjust AVAP setting and lower FIO2 to 80%. According to Dr. Luong patient has hx of COPD.   Signed out to the night team, they will follow.

## 2022-01-03 NOTE — PROGRESS NOTE ADULT - ASSESSMENT
CBC Full  -  ( 03 Jan 2022 06:58 )  WBC Count : 19.91 K/uL  RBC Count : 4.67 M/uL  Hemoglobin : 14.5 g/dL  Hematocrit : 44.7 %  Platelet Count - Automated : 554 K/uL  Mean Cell Volume : 95.7 fL  Mean Cell Hemoglobin : 31.0 pg  Mean Cell Hemoglobin Concentration : 32.4 gm/dL  Auto Neutrophil # : 16.50 K/uL  Auto Lymphocyte # : 1.53 K/uL  Auto Monocyte # : 1.59 K/uL  Auto Eosinophil # : 0.00 K/uL  Auto Basophil # : 0.04 K/uL  Auto Neutrophil % : 82.8 %  Auto Lymphocyte % : 7.7 %  Auto Monocyte % : 8.0 %  Auto Eosinophil % : 0.0 %  Auto Basophil % : 0.2 %        PHYSICAL EXAM:  GENERAL: NAD, well-groomed, well-developed  HEAD:  Atraumatic, Normocephalic  EYES: EOMI, PERRLA, conjunctiva and sclera clear  NECK: Supple, No JVD  NERVOUS SYSTEM:  Alert & Oriented X3, No gross focal deficits  CHEST/LUNG: Clear to percussion bilaterally; No rales, rhonchi, wheezing, or rubs  HEART: Regular rate and rhythm; S1S2 present  ABDOMEN: Soft, Nontender, Nondistended; Bowel sounds present  EXTREMITIES:  No clubbing, cyanosis, or edema  SKIN: No rashes or lesions    LABS:                        14.5   19.91 )-----------( 554      ( 03 Jan 2022 06:58 )             44.7   01-03    146<H>  |  102  |  22  ----------------------------<  115<H>  3.2<L>   |  27  |  0.44<L>    Ca    9.4      03 Jan 2022 06:58  Phos  2.5     01-03  Mg     2.20     01-03    TPro  6.3  /  Alb  3.3  /  TBili  0.7  /  DBili  x   /  AST  25  /  ALT  30  /  AlkPhos  84  01-03    CAPILLARY BLOOD GLUCOSE    POCT Blood Glucose.: 134 mg/dL (03 Jan 2022 17:57)  POCT Blood Glucose.: 142 mg/dL (03 Jan 2022 12:01)  POCT Blood Glucose.: 121 mg/dL (03 Jan 2022 05:45)  POCT Blood Glucose.: 89 mg/dL (02 Jan 2022 23:53)      HEALTH ISSUES - PROBLEM Dx:  Acute respiratory failure with hypoxia    Pneumonia due to COVID-19 virus    Hypertension    Lower extremity edema    Need for prophylactic measure    Problem/Plan - 1:  ·  Problem: Acute respiratory failure with hypoxia.   ·  Plan: AVAPS  APRV Biphasic mode: 26/13  TV: 425/313  EPAP: 8  FIO2: 80%  O2 Sat.: 90-95 %     Problem/Plan - 2:  ·  Problem: Pneumonia due to COVID-19 virus.   ·  Plan: DEXAMETHASONE  REMDESIVIR  LOVENOX  ALBUTEROL.     Problem/Plan - 3:  ·  Problem: Hypertension.   ·  Plan: AMLODIPINE  LASIX  KCL.     Problem/Plan - 4:  ·  Problem: Lower extremity edema.   ·  Plan: LASIX  KCL.     Problem/Plan - 5:  ·  Problem: Need for prophylactic measure.   ·  Plan: As per orders.

## 2022-01-03 NOTE — DIETITIAN INITIAL EVALUATION ADULT. - OTHER INFO
Patient is a 76 year old Female with PMHx HTN, Asthma as a child (No hx of intubations/hospitalizations), Venous insufficiency, Lymphedema, Osteoarthritis, presenting with respiratory failure 2/2 COVID.   Noted per H&P, patient with some decreased appetite in settings of SOB 2/2 COVID+. Patient remains on NPO status since 12/25/21 per MD order (x9 days), PA is aware, and per PA's note 1/2/22 -- Pt satting well 97-98% on AVAPS, will call respiratory and trial on High flow, will monitor O2 sat. Per SLP swallow assessment today 1/3/22 --Attempted to see patient at bedside this AM, however, patient on AVAPs. Per RN, patient desaturating on High Flow Nasal Cannula and requiring higher oxygen demand, therefore, swallow assessment not warranted at this time. SLP continues to follow. RDN remains available and please consult for recommendations if alternative means of nutrition support is within pt's GOC. No reports of N/V/C/D at this time, last BM 12/30 per flowsheet.     Skin: + MAD to sacral/gluteal, + IAD to perineum. pressure injury to nose 2/2 AVAPS, pending wound care consult.  Edema: + hx of lymphedema, noted on diuretic per MD order, no edema noted per flowsheet at this time.

## 2022-01-03 NOTE — DIETITIAN INITIAL EVALUATION ADULT. - ADD RECOMMEND
1. Advance diet as tolerated when clinically appropriate per MD's discretion. Defer diet consistency/texture to SLP's rec/MD order. 2. May consider adding Multivitamin for micronutrient coverage. 3. Please consult RD for recommendations if alternative means of nutrition support is within pt's GOC. 4. Monitor weights, labs, BM's, skin integrity, NPO status, pt's GOC.

## 2022-01-03 NOTE — DIETITIAN INITIAL EVALUATION ADULT. - PERTINENT LABORATORY DATA
01-03 Na146 mmol/L<H> Glu 115 mg/dL<H> K+ 3.2 mmol/L<L> Cr  0.44 mg/dL<L> BUN 22 mg/dL 01-03 Phos 2.5 mg/dL 01-03 Alb 3.3 g/dL 12-24 Chol 178 mg/dL LDL --    HDL 39 mg/dL<L> Trig 120 mg/dL    CAPILLARY BLOOD GLUCOSE  POCT Blood Glucose.: 142 mg/dL (03 Jan 2022 12:01)  POCT Blood Glucose.: 121 mg/dL (03 Jan 2022 05:45)  POCT Blood Glucose.: 89 mg/dL (02 Jan 2022 23:53)  POCT Blood Glucose.: 89 mg/dL (02 Jan 2022 17:48)

## 2022-01-03 NOTE — DIETITIAN INITIAL EVALUATION ADULT. - PERTINENT MEDS FT
MEDICATIONS  (STANDING):  amLODIPine   Tablet 5 milliGRAM(s) Oral daily  aspirin enteric coated 81 milliGRAM(s) Oral daily  chlorhexidine 2% Cloths 1 Application(s) Topical daily  dextrose 5%. 1000 milliLiter(s) (50 mL/Hr) IV Continuous <Continuous>  enoxaparin Injectable 80 milliGRAM(s) SubCutaneous every 12 hours  furosemide    Tablet 40 milliGRAM(s) Oral daily  gabapentin 200 milliGRAM(s) Oral two times a day  senna 2 Tablet(s) Oral at bedtime    MEDICATIONS  (PRN):  acetaminophen     Tablet .. 650 milliGRAM(s) Oral every 4 hours PRN Temp greater or equal to 38C (100.4F), Mild Pain (1 - 3), Moderate Pain (4 - 6)  ALBUTerol    90 MICROgram(s) HFA Inhaler 2 Puff(s) Inhalation every 4 hours PRN Shortness of Breath and/or Wheezing  benzonatate 200 milliGRAM(s) Oral three times a day PRN Cough  guaifenesin/dextromethorphan Oral Liquid 10 milliLiter(s) Oral every 4 hours PRN Cough

## 2022-01-03 NOTE — DIETITIAN INITIAL EVALUATION ADULT. - PROBLEM SELECTOR PLAN 1
- pt initially SP02 70s on NC in ER  - currently Sp02 95% on 15L NRB now escalated to HFNC and comfortable sats >95%  - CXR showing COVID pneumonia  - start Remdesivir/continue decadron  - Ddimer 532  - check proBNP  - monitor on continuous pulse ox  - wean 02 as tolerated

## 2022-01-03 NOTE — DIETITIAN INITIAL EVALUATION ADULT. - PROBLEM SELECTOR PLAN 3
- pt with hx of lymphedema   - reports she is on PO lasix 40mg QD  - continue home med   - check proBNP in AM

## 2022-01-03 NOTE — PROGRESS NOTE ADULT - SUBJECTIVE AND OBJECTIVE BOX
RENNY PERSAUD Female  Patient is a 76y old  Female who presents with a chief complaint of + Acute hypoxic respiratory failure 2/2 COVID pneumonia (03 Jan 2022 15:04)      INTERVAL HPI/OVERNIGHT EVENTS:  HPI:  76 year old Female PMHx HTN, Asthma as a child (No hx of intubations/hospitalizations, Venous insufficiency, Lymphedema  Osteoarthritis presenting with fatigue, productive cough, and dyspnea. Patient report she was exposed to sick contact-  + COVID x 20 days ago. Patients  is currently admitted at [facility]. Patient states she did not have any symptoms however her sister who works as a nurse noted pt appeared dyspneic check Sp02 at home 66% She sent a rapid test today that came back + COVID. Notified PCP - Dr. Cherise Amos who recommended her to go to the hospital. Pt reports she took Zpak on 12/17 as a preventative measure and completed course. Endorses some decreased appetite and SOB now improved at rest on HFNC. Patient is unvaccinated, denies having COVID in the past. Has hx of chronic LE swelling is on PO lasix.  Denies hx of PE/DVT, fever, body aches, headache, vision change, chest pain, abdominal pain, nausea, vomiting, melena, hematochezia, dysuria, urinary frequency, calf pain.     In ED vitals: Temp 98F HR 75 /83 SP02 initially 70s on NC currently 95% on 15L NRB - escalated to HFNC  Labs- Ddimer - 532 Procalcitonin - 0.08 Ferritin 503  Patient is s/p PO tylenol and IV Decadron 6mg x1.  (23 Dec 2021 20:08)      FAMILY HISTORY:  Family history of heart disease (Father)      Allergies    Bactrim (Unknown)  Betadine (Unknown)  latex (Unknown)  sulfa drugs (Unknown)    Intolerances      MEDICATIONS  (STANDING):  amLODIPine   Tablet 5 milliGRAM(s) Oral daily  aspirin enteric coated 81 milliGRAM(s) Oral daily  cefepime   IVPB      chlorhexidine 2% Cloths 1 Application(s) Topical daily  dextrose 5%. 1000 milliLiter(s) (50 mL/Hr) IV Continuous <Continuous>  enoxaparin Injectable 80 milliGRAM(s) SubCutaneous every 12 hours  furosemide    Tablet 40 milliGRAM(s) Oral daily  gabapentin 200 milliGRAM(s) Oral two times a day  methylPREDNISolone sodium succinate Injectable 40 milliGRAM(s) IV Push every 8 hours  potassium chloride  10 mEq/100 mL IVPB 10 milliEquivalent(s) IV Intermittent every 1 hour  senna 2 Tablet(s) Oral at bedtime  vancomycin  IVPB        MEDICATIONS  (PRN):  acetaminophen     Tablet .. 650 milliGRAM(s) Oral every 4 hours PRN Temp greater or equal to 38C (100.4F), Mild Pain (1 - 3), Moderate Pain (4 - 6)  ALBUTerol    90 MICROgram(s) HFA Inhaler 2 Puff(s) Inhalation every 4 hours PRN Shortness of Breath and/or Wheezing  benzonatate 200 milliGRAM(s) Oral three times a day PRN Cough  guaifenesin/dextromethorphan Oral Liquid 10 milliLiter(s) Oral every 4 hours PRN Cough    REVIEW OF SYSTEMS:  CONSTITUTIONAL: No fever, weight loss, or fatigue  RESPIRATORY: No cough, wheezing, chills or hemoptysis; No shortness of breath  CARDIOVASCULAR: No chest pain, palpitations, dizziness, or leg swelling  GASTROINTESTINAL: No abdominal or epigastric pain. No nausea, vomiting, or hematemesis; No diarrhea or constipation. No melena or hematochezia.  NEUROLOGICAL: No headaches, memory loss, loss of strength, numbness, or tremors  MUSCULOSKELETAL: No joint pain or swelling; No muscle, back, or extremity pain  SKIN: No rashes or lesions    Vital Signs Last 24 Hrs  T(C): 36.9 (03 Jan 2022 17:20), Max: 36.9 (03 Jan 2022 09:33)  T(F): 98.5 (03 Jan 2022 17:20), Max: 98.5 (03 Jan 2022 09:33)  HR: 62 (03 Jan 2022 22:37) (62 - 85)  BP: 110/73 (03 Jan 2022 17:20) (102/72 - 110/73)  BP(mean): --  RR: 20 (03 Jan 2022 17:20) (19 - 20)  SpO2: 93% (03 Jan 2022 22:37) (89% - 100%)    T(C): 36.9 (01-03-22 @ 17:20), Max: 36.9 (01-03-22 @ 09:33)  HR: 62 (01-03-22 @ 22:37) (62 - 85)  BP: 110/73 (01-03-22 @ 17:20) (102/72 - 110/73)  RR: 20 (01-03-22 @ 17:20) (19 - 20)  SpO2: 93% (01-03-22 @ 22:37) (89% - 100%)      I&O's Summary    03 Jan 2022 07:01  -  03 Jan 2022 22:41  --------------------------------------------------------  IN: 0 mL / OUT: 800 mL / NET: -800 mL

## 2022-01-04 NOTE — PROGRESS NOTE ADULT - ASSESSMENT
CBC Full  -  ( 2022 07:18 )  WBC Count : 16.15 K/uL  RBC Count : 4.66 M/uL  Hemoglobin : 13.9 g/dL  Hematocrit : 44.2 %  Platelet Count - Automated : 471 K/uL  Mean Cell Volume : 94.8 fL  Mean Cell Hemoglobin : 29.8 pg  Mean Cell Hemoglobin Concentration : 31.4 gm/dL  Auto Neutrophil # : x  Auto Lymphocyte # : x  Auto Monocyte # : x  Auto Eosinophil # : x  Auto Basophil # : x  Auto Neutrophil % : x  Auto Lymphocyte % : x  Auto Monocyte % : x  Auto Eosinophil % : x  Auto Basophil % : x        PHYSICAL EXAM:  GENERAL: NAD, well-groomed, well-developed  HEAD:  Atraumatic, Normocephalic  EYES: EOMI, PERRLA, conjunctiva and sclera clear  NECK: Supple, No JVD  NERVOUS SYSTEM:  Alert & Oriented X3, No gross focal deficits  CHEST/LUNG: Clear to percussion bilaterally; No rales, rhonchi, wheezing, or rubs  HEART: Regular rate and rhythm; S1S2 present  ABDOMEN: Soft, Nontender, Nondistended; Bowel sounds present  EXTREMITIES:  No clubbing, cyanosis, or edema  SKIN: No rashes or lesions    LABS:                        13.9   16.15 )-----------( 471      ( 2022 07:18 )             44.2   01-04    144  |  100  |  38<H>  ----------------------------<  207<H>  3.6   |  29  |  0.57    Ca    10.3      2022 10:35  Phos  2.5     01-03  Mg     2.20     01-03    TPro  7.0  /  Alb  3.6  /  TBili  0.6  /  DBili  x   /  AST  15  /  ALT  23  /  AlkPhos  82  01-04      Urinalysis Basic - ( 2022 03:59 )    Color: Yellow / Appearance: Clear / S.026 / pH: x  Gluc: x / Ketone: Small  / Bili: Negative / Urobili: <2 mg/dL   Blood: x / Protein: Trace / Nitrite: Negative   Leuk Esterase: Negative / RBC: 11 /HPF / WBC 1 /HPF   Sq Epi: x / Non Sq Epi: 1 /HPF / Bacteria: Negative      CAPILLARY BLOOD GLUCOSE      POCT Blood Glucose.: 152 mg/dL (2022 16:50)  POCT Blood Glucose.: 173 mg/dL (2022 12:24)  POCT Blood Glucose.: 183 mg/dL (2022 09:08)  POCT Blood Glucose.: 160 mg/dL (2022 05:14)  POCT Blood Glucose.: 135 mg/dL (2022 23:37)      HEALTH ISSUES - PROBLEM Dx:  Acute respiratory failure with hypoxia    Pneumonia due to COVID-19 virus    Hypertension    Lower extremity edema    Need for prophylactic measure    Problem/Plan - 1:  ·  Problem: Acute respiratory failure with hypoxia.   ·  Plan: HFlNC  FIO2: 100%  Flow: 55 L/min.  O2 Sat.: 99 %     Problem/Plan - 2:  ·  Problem: Pneumonia due to COVID-19 virus. / COPD  methylPREDNISolone sodium succinate  cefepime & vanco.  S/P  DEXAMETHASONE  REMDESIVIR  On  LOVENOX  ALBUTEROL.     Problem/Plan - 3:  ·  Problem: Hypertension.   ·  Plan: AMLODIPINE  LASIX  KCL.     Problem/Plan - 4:  ·  Problem: Lower extremity edema.   ·  Plan: LASIX  KCL.     Problem/Plan - 5:  ·  Problem: Need for prophylactic measure.   ·  Plan: As per orders.

## 2022-01-04 NOTE — ADVANCED PRACTICE NURSE CONSULT - REASON FOR CONSULT
Patient seen on skin care rounds after wound care referral received for assessment of skin impairment and recommendations of topical management. Chart reviewed: Yonatan Scale range 15-19 currently 15, BMI 34.8kg/m2, WBC 16.15, LACE score 8. Patient H/O of HTN, Asthma as a child (No hx of intubations/hospitalizations, Venous insufficiency, Lymphedema  Osteoarthritis presenting with fatigue, productive cough, and dyspnea. Patient report she was exposed to sick contact-  + COVID x 20 days ago. Patients  is currently admitted at [facility]. Patient states she did not have any symptoms however her sister who works as a nurse noted pt appeared dyspneic check Sp02 at home 66% She sent a rapid test today that came back + COVID. Notified PCP - Dr. Cherise Amos who recommended her to go to the hospital. Pt reports she took Zpak on 12/17 as a preventative measure and completed course. Endorses some decreased appetite and SOB now improved at rest on HFNC. Patient is unvaccinated, denies having COVID in the past. Has hx of chronic LE swelling is on PO lasix.  Denies hx of PE/DVT, fever, body aches, headache, vision change, chest pain, abdominal pain, nausea, vomiting, melena, hematochezia, dysuria, urinary frequency, calf pain. Patient admitted for + Acute hypoxic respiratory failure 2/2 COVID pneumonia.

## 2022-01-04 NOTE — SWALLOW BEDSIDE ASSESSMENT ADULT - SWALLOW EVAL: RECOMMENDED DIET
Defer diet to MD given limited PO trials accept by patient/clinical presentation as described above. Defer diet to MD given limited PO trials accepted by patient/clinical presentation as described above.

## 2022-01-04 NOTE — SWALLOW BEDSIDE ASSESSMENT ADULT - SWALLOW EVAL: DIAGNOSIS
This is a limited assessment as patient only accepted ice chips. Patient refused to accept mildly thick liquids, moderately thick liquids, puree and solid textures despite maximum encouragement from SLP. Given ice chip trials, oral stage marked by adequate acceptance and anterior-posterior transport. Pharyngeal phase marked by observed initiation of the pharyngeal swallow trigger and hyolaryngeal excursion judged via laryngeal palpation. Patient noted with inconsistent throat clear response and inconsistent wet cough, possibly indicative of aspiration. However, as aforementioned, oral and pharyngeal stage of swallow unable to be adequately assessed due to limited trials accepted by patient.

## 2022-01-04 NOTE — PROGRESS NOTE ADULT - SUBJECTIVE AND OBJECTIVE BOX
RENNY PERSAUD Female  Patient is a 76y old  Female who presents with a chief complaint of + Acute hypoxic respiratory failure 2/2 COVID pneumonia (03 Jan 2022 22:41)      INTERVAL HPI/OVERNIGHT EVENTS:  HPI:  76 year old Female PMHx HTN, Asthma as a child (No hx of intubations/hospitalizations, Venous insufficiency, Lymphedema  Osteoarthritis presenting with fatigue, productive cough, and dyspnea. Patient report she was exposed to sick contact-  + COVID x 20 days ago. Patients  is currently admitted at [facility]. Patient states she did not have any symptoms however her sister who works as a nurse noted pt appeared dyspneic check Sp02 at home 66% She sent a rapid test today that came back + COVID. Notified PCP - Dr. Cherise Amos who recommended her to go to the hospital. Pt reports she took Zpak on 12/17 as a preventative measure and completed course. Endorses some decreased appetite and SOB now improved at rest on HFNC. Patient is unvaccinated, denies having COVID in the past. Has hx of chronic LE swelling is on PO lasix.  Denies hx of PE/DVT, fever, body aches, headache, vision change, chest pain, abdominal pain, nausea, vomiting, melena, hematochezia, dysuria, urinary frequency, calf pain.     In ED vitals: Temp 98F HR 75 /83 SP02 initially 70s on NC currently 95% on 15L NRB - escalated to HFNC  Labs- Ddimer - 532 Procalcitonin - 0.08 Ferritin 503  Patient is s/p PO tylenol and IV Decadron 6mg x1.  (23 Dec 2021 20:08)      FAMILY HISTORY:  Family history of heart disease (Father)      Allergies    Bactrim (Unknown)  Betadine (Unknown)  latex (Unknown)  sulfa drugs (Unknown)    Intolerances      MEDICATIONS  (STANDING):  amLODIPine   Tablet 5 milliGRAM(s) Oral daily  aspirin enteric coated 81 milliGRAM(s) Oral daily  cefepime   IVPB      cefepime   IVPB 2000 milliGRAM(s) IV Intermittent every 12 hours  chlorhexidine 2% Cloths 1 Application(s) Topical daily  dextrose 5%. 1000 milliLiter(s) (50 mL/Hr) IV Continuous <Continuous>  enoxaparin Injectable 80 milliGRAM(s) SubCutaneous every 12 hours  furosemide    Tablet 40 milliGRAM(s) Oral daily  gabapentin 200 milliGRAM(s) Oral two times a day  methylPREDNISolone sodium succinate Injectable 40 milliGRAM(s) IV Push every 8 hours  senna 2 Tablet(s) Oral at bedtime  vancomycin  IVPB      vancomycin  IVPB 1000 milliGRAM(s) IV Intermittent every 12 hours    MEDICATIONS  (PRN):  acetaminophen     Tablet .. 650 milliGRAM(s) Oral every 4 hours PRN Temp greater or equal to 38C (100.4F), Mild Pain (1 - 3), Moderate Pain (4 - 6)  ALBUTerol    90 MICROgram(s) HFA Inhaler 2 Puff(s) Inhalation every 4 hours PRN Shortness of Breath and/or Wheezing  benzonatate 200 milliGRAM(s) Oral three times a day PRN Cough  guaifenesin/dextromethorphan Oral Liquid 10 milliLiter(s) Oral every 4 hours PRN Cough    REVIEW OF SYSTEMS:  CONSTITUTIONAL: No fever, weight loss, or fatigue  RESPIRATORY: No cough, wheezing, chills or hemoptysis; No shortness of breath  CARDIOVASCULAR: No chest pain, palpitations, dizziness, or leg swelling  GASTROINTESTINAL: No abdominal or epigastric pain. No nausea, vomiting, or hematemesis; No diarrhea or constipation. No melena or hematochezia.  NEUROLOGICAL: No headaches, memory loss, loss of strength, numbness, or tremors  MUSCULOSKELETAL: No joint pain or swelling; No muscle, back, or extremity pain  SKIN: No rashes or lesions    Vital Signs Last 24 Hrs  T(C): 36.6 (04 Jan 2022 21:30), Max: 37.1 (04 Jan 2022 05:10)  T(F): 97.9 (04 Jan 2022 21:30), Max: 98.7 (04 Jan 2022 05:10)  HR: 65 (04 Jan 2022 21:30) (65 - 81)  BP: 131/76 (04 Jan 2022 21:30) (117/71 - 132/81)  BP(mean): 96 (04 Jan 2022 13:01) (96 - 96)  RR: 20 (04 Jan 2022 21:30) (18 - 20)  SpO2: 99% (04 Jan 2022 21:30) (94% - 99%)    T(C): 36.6 (01-04-22 @ 21:30), Max: 37.1 (01-04-22 @ 05:10)  HR: 65 (01-04-22 @ 21:30) (65 - 81)  BP: 131/76 (01-04-22 @ 21:30) (117/71 - 132/81)  RR: 20 (01-04-22 @ 21:30) (18 - 20)  SpO2: 99% (01-04-22 @ 21:30) (94% - 99%)    I&O's Summary    03 Jan 2022 07:01  -  04 Jan 2022 07:00  --------------------------------------------------------  IN: 0 mL / OUT: 800 mL / NET: -800 mL    04 Jan 2022 07:01  -  04 Jan 2022 22:47  --------------------------------------------------------  IN: 0 mL / OUT: 600 mL / NET: -600 mL

## 2022-01-04 NOTE — ADVANCED PRACTICE NURSE CONSULT - RECOMMEDATIONS
Pending Speech and swallow recommendations   Continue to monitor patient off AVAP's   Recommend Wound Care providers consult Kelsey Cook NP, and wound care attending Meg Carrasquillo/Tom Scott to reach them by email  Recommend Nutrition consult once NPO resumes     Topical Recommendations     Bridge of the nose: Clean with NS. Pat dry. Apply Liquid barrier film to periwound skin. Apply Medihoney gel to wound bed. Cover with small silicone foam with borders. Change daily or if soiled.     Bilateral heels: Apply Liquid barrier film daily.     Continue low air loss bed therapy, heel elevation with complete CAIR boot, continue to turn & reposition q2h with Z-flow positioner, soft pillow between bony prominences and behind knees, continue moisture management with barrier creams & single breathable pad, continue measures to decrease friction/shear/pressure.     Please contact Wound Care Service Line if we can be of further assistance (ext [phone]).

## 2022-01-04 NOTE — SWALLOW BEDSIDE ASSESSMENT ADULT - COMMENTS
Per progress note 01/02/22, patient is a "76 year old Female PMHx HTN, Asthma as a child (No hx of intubations/hospitalizations, Venous insufficiency, Lymphedema  Osteoarthritis presenting with fatigue, productive cough, and dyspnea. Patient report she was exposed to sick contact-  + COVID x 20 days ago. Patients  is currently admitted at [facility]. Patient states she did not have any symptoms however her sister who works as a nurse noted pt appeared dyspneic check Sp02 at home 66% She sent a rapid test today that came back + COVID. Notified PCP - Dr. Cherise Amos who recommended her to go to the hospital. Pt reports she took Zpak on 12/17 as a preventative measure and completed course. Endorses some decreased appetite and SOB now improved at rest on HFNC. Patient is unvaccinated, denies having COVID in the past. Has hx of chronic LE swelling is on PO lasix.  Denies hx of PE/DVT, fever, body aches, headache, vision change, chest pain, abdominal pain, nausea, vomiting, melena, hematochezia, dysuria, urinary frequency, calf pain.".    WBC is elevated. Most recent CXR revealed "Diffuse bilateral patchy opacities, most predominantly within the lower lobes, could correlate with Covid 19 pneumonia.    Attempted to see patient at bedside this AM, however, patient on AVAPs. Per RN, patient desaturating on High Flow Nasal Cannula and requiring higher oxygen demand, therefore, swallow assessment not warranted at this time.
Per Critical Care Note on 1/3/21: "76 year old Female PMHx HTN, Asthma as a child (No hx of intubations/hospitalizations, Venous insufficiency, Lymphedema  Osteoarthritis presenting with fatigue, productive cough, and dyspnea. Patient report she was exposed to sick contact-  + COVID x 20 days ago."    Patient is familiar to this department as the patient was seen yesterday for an initial swallow evaluation however patient unable to be seen due to increased respiratory requirement. Please refer to full report for further details.    Patient received upright in bed, awake, alert and communicative with HFNC in place. Baseline Spo2 remained between 96 and 100% throughout the evaluation.

## 2022-01-04 NOTE — ADVANCED PRACTICE NURSE CONSULT - ASSESSMENT
Patient . Forgetful. Upon first encounter patient refusing skin assessment, after counseling patient, patient agreeing. Patient reoriented multiple times during skin assessment, patient keeps requesting to go home. Patient making repeated comments " don's worry about my wound I want to go home". Patient able to turn in bed with one person assists. Bilateral Hallux with bunions and adhesives. Left second toe with adhesive (Patient refused to removed adhesives).     Bridge of the nose mixed etiology wound COVID skin manifestation and medical device (Patient requiring AVAPS since admission for hypoxia, patient underwent some trials with high flow oxygen on 12/26, 12/30 and 1/2, unable to maintained oxygen) Spoke to primary RN who reports  on Sunday wound with some blood filled blistering and some areas open exposing necrotic tissue, now patient received with high flow oxygen- 1cmx1.5cmx0.2cm. Unable to determine true anatomical depth secondary to necrotic tissue. Tissue type exposing 100% brown/dark firmly attach necrotic tissue. No fluctuance. No drainage noted. Upon initial encounter presenting with dry exudate able to be removed upon cleansing. Blanching erythema circumferentially that extends 0.5cm. No increased warmth, no induration. Goals of care: monitor for tissue type changes, continue to offload from medical device, autolytic debridement of necrotic tissue.     Risk for Moisture/Incontinence associated dermatitis. Intact skin. External female  in place (Primafit).     Bedside RN at bedside during skin assessment   Finding discussed with YULIANA See     After skin care consult Speech and swallow clinician at bedside to evaluate patient    Patient . Forgetful. Upon first encounter patient refusing skin assessment, after counseling patient, patient agreeing. Patient reoriented multiple times during skin assessment, patient keeps requesting to go home. Patient making repeated comments " don's worry about my wound I want to go home". Patient able to turn in bed with one person assists. Bilateral Hallux with bunions and adhesives. Left second toe with adhesive (Patient refused to removed adhesives).     Bridge of the nose mixed etiology wound COVID skin manifestation and medical device (Patient requiring AVAPS since admission for hypoxia, patient underwent some trials with high flow oxygen on 12/26, 12/30 and 1/2, unable to maintained oxygen. Today AVAPS removed and placed on high flow oxygen Spoke to primary RN who reports on Sunday wound with some blood filled blistering and some areas open exposing necrotic tissue, now patient received with high flow oxygen- 1cmx1.5cmx0.2cm. Unable to determine true anatomical depth secondary to necrotic tissue. Tissue type exposing 100% brown/dark firmly attach necrotic tissue. No fluctuance. No drainage noted. Upon initial encounter presenting with dry exudate able to be removed upon cleansing. Blanching erythema circumferentially that extends 0.5cm. No increased warmth, no induration. Goals of care: monitor for tissue type changes, continue to offload from medical device, autolytic debridement of necrotic tissue.     Risk for Moisture/Incontinence associated dermatitis. Intact skin. External female  in place (Primafit).     Bedside RN at bedside during skin assessment   Finding discussed with YULIANA See     After skin care consult Speech and swallow clinician at bedside to evaluate patient

## 2022-01-05 NOTE — PROGRESS NOTE ADULT - SUBJECTIVE AND OBJECTIVE BOX
RENNY PERSAUD Female  Patient is a 76y old  Female who presents with a chief complaint of + Acute hypoxic respiratory failure 2/2 COVID pneumonia (04 Jan 2022 22:46)      INTERVAL HPI/OVERNIGHT EVENTS:  HPI:  76 year old Female PMHx HTN, Asthma as a child (No hx of intubations/hospitalizations, Venous insufficiency, Lymphedema  Osteoarthritis presenting with fatigue, productive cough, and dyspnea. Patient report she was exposed to sick contact-  + COVID x 20 days ago. Patients  is currently admitted at [facility]. Patient states she did not have any symptoms however her sister who works as a nurse noted pt appeared dyspneic check Sp02 at home 66% She sent a rapid test today that came back + COVID. Notified PCP - Dr. Cherise Amos who recommended her to go to the hospital. Pt reports she took Zpak on 12/17 as a preventative measure and completed course. Endorses some decreased appetite and SOB now improved at rest on HFNC. Patient is unvaccinated, denies having COVID in the past. Has hx of chronic LE swelling is on PO lasix.  Denies hx of PE/DVT, fever, body aches, headache, vision change, chest pain, abdominal pain, nausea, vomiting, melena, hematochezia, dysuria, urinary frequency, calf pain.     In ED vitals: Temp 98F HR 75 /83 SP02 initially 70s on NC currently 95% on 15L NRB - escalated to HFNC  Labs- Ddimer - 532 Procalcitonin - 0.08 Ferritin 503  Patient is s/p PO tylenol and IV Decadron 6mg x1.  (23 Dec 2021 20:08)      FAMILY HISTORY:  Family history of heart disease (Father)      Allergies    Bactrim (Unknown)  Betadine (Unknown)  latex (Unknown)  sulfa drugs (Unknown)    Intolerances      MEDICATIONS  (STANDING):  amLODIPine   Tablet 5 milliGRAM(s) Oral daily  aspirin enteric coated 81 milliGRAM(s) Oral daily  cefepime   IVPB      cefepime   IVPB 2000 milliGRAM(s) IV Intermittent every 12 hours  chlorhexidine 2% Cloths 1 Application(s) Topical daily  dextrose 5%. 1000 milliLiter(s) (50 mL/Hr) IV Continuous <Continuous>  enoxaparin Injectable 80 milliGRAM(s) SubCutaneous every 12 hours  furosemide    Tablet 40 milliGRAM(s) Oral daily  gabapentin 200 milliGRAM(s) Oral two times a day  methylPREDNISolone sodium succinate Injectable 40 milliGRAM(s) IV Push every 8 hours  senna 2 Tablet(s) Oral at bedtime  vancomycin  IVPB      vancomycin  IVPB 1000 milliGRAM(s) IV Intermittent every 12 hours    MEDICATIONS  (PRN):  acetaminophen     Tablet .. 650 milliGRAM(s) Oral every 4 hours PRN Temp greater or equal to 38C (100.4F), Mild Pain (1 - 3), Moderate Pain (4 - 6)  ALBUTerol    90 MICROgram(s) HFA Inhaler 2 Puff(s) Inhalation every 4 hours PRN Shortness of Breath and/or Wheezing  benzonatate 200 milliGRAM(s) Oral three times a day PRN Cough  guaifenesin/dextromethorphan Oral Liquid 10 milliLiter(s) Oral every 4 hours PRN Cough    REVIEW OF SYSTEMS:  CONSTITUTIONAL: No fever, weight loss, or fatigue  RESPIRATORY: No cough, wheezing, chills or hemoptysis; No shortness of breath  CARDIOVASCULAR: No chest pain, palpitations, dizziness, or leg swelling  GASTROINTESTINAL: No abdominal or epigastric pain. No nausea, vomiting, or hematemesis; No diarrhea or constipation. No melena or hematochezia.  NEUROLOGICAL: No headaches, memory loss, loss of strength, numbness, or tremors  MUSCULOSKELETAL: No joint pain or swelling; No muscle, back, or extremity pain  SKIN: No rashes or lesions    Vital Signs Last 24 Hrs  T(C): 37 (05 Jan 2022 21:20), Max: 37 (05 Jan 2022 05:40)  T(F): 98.6 (05 Jan 2022 21:20), Max: 98.6 (05 Jan 2022 05:40)  HR: 68 (05 Jan 2022 21:20) (58 - 68)  BP: 124/63 (05 Jan 2022 21:20) (118/71 - 124/63)  BP(mean): --  RR: 20 (05 Jan 2022 21:20) (18 - 22)  SpO2: 97% (05 Jan 2022 21:20) (93% - 100%)    T(C): 37 (01-05-22 @ 21:20), Max: 37 (01-05-22 @ 05:40)  HR: 68 (01-05-22 @ 21:20) (58 - 68)  BP: 124/63 (01-05-22 @ 21:20) (118/71 - 124/63)  RR: 20 (01-05-22 @ 21:20) (18 - 22)  SpO2: 97% (01-05-22 @ 21:20) (93% - 100%)    I&O's Summary    04 Jan 2022 07:01  -  05 Jan 2022 07:00  --------------------------------------------------------  IN: 0 mL / OUT: 600 mL / NET: -600 mL

## 2022-01-05 NOTE — CHART NOTE - NSCHARTNOTEFT_GEN_A_CORE
wound care recommendations noted . Orders fr wound care placed in [facility] .   Wound Care MD / NP emailed for evaluation

## 2022-01-05 NOTE — PROGRESS NOTE ADULT - ASSESSMENT
CBC Full  -  ( 2022 07:19 )  WBC Count : 17.47 K/uL  RBC Count : 5.22 M/uL  Hemoglobin : 16.2 g/dL  Hematocrit : 52.8 %  Platelet Count - Automated : 344 K/uL  Mean Cell Volume : 101.1 fL  Mean Cell Hemoglobin : 31.0 pg  Mean Cell Hemoglobin Concentration : 30.7 gm/dL  Auto Neutrophil # : x  Auto Lymphocyte # : x  Auto Monocyte # : x  Auto Eosinophil # : x  Auto Basophil # : x  Auto Neutrophil % : x  Auto Lymphocyte % : x  Auto Monocyte % : x  Auto Eosinophil % : x  Auto Basophil % : x        PHYSICAL EXAM:  GENERAL: NAD, well-groomed, well-developed  HEAD:  Atraumatic, Normocephalic  EYES: EOMI, PERRLA, conjunctiva and sclera clear  NECK: Supple, No JVD  NERVOUS SYSTEM:  Alert & Oriented X3, No gross focal deficits  CHEST/LUNG: Clear to percussion bilaterally; No rales, rhonchi, wheezing, or rubs  HEART: Regular rate and rhythm; S1S2 present  ABDOMEN: Soft, Nontender, Nondistended; Bowel sounds present  EXTREMITIES:  No clubbing, cyanosis, or edema  SKIN: No rashes or lesions    LABS:                        16.2   17.47 )-----------( 344      ( 2022 07:19 )             52.8   01-05    140  |  96<L>  |  31<H>  ----------------------------<  160<H>  3.7   |  29  |  0.43<L>    Ca    10.2      2022 07:19  Phos  2.2     01-05  Mg     2.60     01-05    TPro  6.7  /  Alb  3.2<L>  /  TBili  0.6  /  DBili  <0.2  /  AST  24  /  ALT  22  /  AlkPhos  80  01-05      Urinalysis Basic - ( 2022 03:59 )    Color: Yellow / Appearance: Clear / S.026 / pH: x  Gluc: x / Ketone: Small  / Bili: Negative / Urobili: <2 mg/dL   Blood: x / Protein: Trace / Nitrite: Negative   Leuk Esterase: Negative / RBC: 11 /HPF / WBC 1 /HPF   Sq Epi: x / Non Sq Epi: 1 /HPF / Bacteria: Negative      CAPILLARY BLOOD GLUCOSE      POCT Blood Glucose.: 126 mg/dL (2022 17:29)  POCT Blood Glucose.: 121 mg/dL (2022 12:53)  POCT Blood Glucose.: 164 mg/dL (2022 06:24)  POCT Blood Glucose.: 138 mg/dL (2022 23:36)      HEALTH ISSUES - PROBLEM Dx:  Acute respiratory failure with hypoxia    Pneumonia due to COVID-19 virus    Hypertension    Lower extremity edema    Need for prophylactic measure    Problem/Plan - 1:  ·  Problem: Acute respiratory failure with hypoxia.   ·  Plan: HFlNC  FIO2: 100%  Flow: 55 L/min.  O2 Sat.: 97--100%      Problem/Plan - 2:  ·  Problem: Pneumonia due to COVID-19 virus. / COPD  methylPREDNISolone sodium succinate  cefepime & vanco.  S/P  DEXAMETHASONE  REMDESIVIR  On  LOVENOX  ALBUTEROL.     Problem/Plan - 3:  ·  Problem: Hypertension.   ·  Plan: AMLODIPINE  LASIX  KCL.     Problem/Plan - 4:  ·  Problem: Lower extremity edema.   ·  Plan: LASIX  KCL.     Problem/Plan - 5:  ·  Problem: Need for prophylactic measure.   ·  Plan: As per orders.

## 2022-01-06 NOTE — CONSULT NOTE ADULT - ASSESSMENT
D/C Honey to left leg wound   D/C Honey  to nose eschar  start cavilon    POTENTIAL FOR SCAR DISCUSSED

## 2022-01-06 NOTE — PHYSICAL THERAPY INITIAL EVALUATION ADULT - ADDITIONAL COMMENTS
Pt. reports ambulating with a straight cane.     Pt. was left seated in chair post PT Evaluation, no apparent distress, all lines intact, call bell within reach, SpO2 95%, /79. RN made aware of pt. status and participation in PT.

## 2022-01-06 NOTE — PHYSICAL THERAPY INITIAL EVALUATION ADULT - GAIT DEVIATIONS NOTED, PT EVAL
decreased emi/increased time in double stance/decreased step length/decreased stride length/decreased weight-shifting ability

## 2022-01-06 NOTE — PROGRESS NOTE ADULT - ASSESSMENT
LABS:    CBC Full  -  ( 06 Jan 2022 11:43 )  WBC Count : 16.40 K/uL  RBC Count : 4.37 M/uL  Hemoglobin : 13.1 g/dL  Hematocrit : 40.6 %  Platelet Count - Automated : 458 K/uL  Mean Cell Volume : 92.9 fL  Mean Cell Hemoglobin : 30.0 pg  Mean Cell Hemoglobin Concentration : 32.3 gm/dL  Auto Neutrophil # : x  Auto Lymphocyte # : x  Auto Monocyte # : x  Auto Eosinophil # : x  Auto Basophil # : x  Auto Neutrophil % : x  Auto Lymphocyte % : x  Auto Monocyte % : x  Auto Eosinophil % : x  Auto Basophil % : x                            13.1   16.40 )-----------( 458      ( 06 Jan 2022 11:43 )             40.6   01-05    140  |  96<L>  |  31<H>  ----------------------------<  160<H>  3.7   |  29  |  0.43<L>    Ca    10.2      05 Jan 2022 07:19  Phos  2.2     01-05  Mg     2.60     01-05    TPro  6.0  /  Alb  3.2<L>  /  TBili  0.6  /  DBili  <0.2  /  AST  20  /  ALT  21  /  AlkPhos  70  01-06        CAPILLARY BLOOD GLUCOSE      POCT Blood Glucose.: 142 mg/dL (06 Jan 2022 12:32)  POCT Blood Glucose.: 134 mg/dL (05 Jan 2022 23:39)      HEALTH ISSUES - PROBLEM Dx:  Acute respiratory failure with hypoxia    Pneumonia due to COVID-19 virus    Hypertension    Lower extremity edema    Need for prophylactic measure    Problem/Plan - 1:  ·  Problem: Acute respiratory failure with hypoxia.   ·  Plan: HFlNC  FIO2: 100%  Flow: 55 L/min.  O2 Sat.: 93--100%      Problem/Plan - 2:  ·  Problem: Pneumonia due to COVID-19 virus. / COPD  methylPREDNISolone sodium succinate  cefepime & vanco.  S/P  DEXAMETHASONE  REMDESIVIR  On  LOVENOX  ALBUTEROL.     Problem/Plan - 3:  ·  Problem: Hypertension.   ·  Plan: AMLODIPINE  LASIX  KCL.     Problem/Plan - 4:  ·  Problem: Lower extremity edema.   ·  Plan: LASIX  KCL.     Problem/Plan - 5:  ·  Problem: Need for prophylactic measure.   ·  Plan: As per orders.

## 2022-01-06 NOTE — CHART NOTE - NSCHARTNOTEFT_GEN_A_CORE
Spoke to pharm regarding trough of Vanco. As per pharm should be between 15-20 so ok to give dose of vanco. New trough to be drawn tomorrow at 1700h prior to next dose of vancomycin

## 2022-01-06 NOTE — PROGRESS NOTE ADULT - SUBJECTIVE AND OBJECTIVE BOX
RENNY PERSAUD Female  Patient is a 76y old  Female who presents with a chief complaint of + Acute hypoxic respiratory failure 2/2 COVID pneumonia (06 Jan 2022 12:30)      INTERVAL HPI/OVERNIGHT EVENTS:  HPI:  76 year old Female PMHx HTN, Asthma as a child (No hx of intubations/hospitalizations, Venous insufficiency, Lymphedema  Osteoarthritis presenting with fatigue, productive cough, and dyspnea. Patient report she was exposed to sick contact-  + COVID x 20 days ago. Patients  is currently admitted at [facility]. Patient states she did not have any symptoms however her sister who works as a nurse noted pt appeared dyspneic check Sp02 at home 66% She sent a rapid test today that came back + COVID. Notified PCP - Dr. Cherise Amos who recommended her to go to the hospital. Pt reports she took Zpak on 12/17 as a preventative measure and completed course. Endorses some decreased appetite and SOB now improved at rest on HFNC. Patient is unvaccinated, denies having COVID in the past. Has hx of chronic LE swelling is on PO lasix.  Denies hx of PE/DVT, fever, body aches, headache, vision change, chest pain, abdominal pain, nausea, vomiting, melena, hematochezia, dysuria, urinary frequency, calf pain.     In ED vitals: Temp 98F HR 75 /83 SP02 initially 70s on NC currently 95% on 15L NRB - escalated to HFNC  Labs- Ddimer - 532 Procalcitonin - 0.08 Ferritin 503  Patient is s/p PO tylenol and IV Decadron 6mg x1.  (23 Dec 2021 20:08)      FAMILY HISTORY:  Family history of heart disease (Father)      Allergies    Bactrim (Unknown)  Betadine (Unknown)  latex (Unknown)  sulfa drugs (Unknown)    Intolerances      MEDICATIONS  (STANDING):  amLODIPine   Tablet 5 milliGRAM(s) Oral daily  aspirin enteric coated 81 milliGRAM(s) Oral daily  cefepime   IVPB      cefepime   IVPB 2000 milliGRAM(s) IV Intermittent every 12 hours  chlorhexidine 2% Cloths 1 Application(s) Topical daily  dextrose 5%. 1000 milliLiter(s) (50 mL/Hr) IV Continuous <Continuous>  enoxaparin Injectable 80 milliGRAM(s) SubCutaneous every 12 hours  furosemide    Tablet 40 milliGRAM(s) Oral daily  gabapentin 200 milliGRAM(s) Oral two times a day  methylPREDNISolone sodium succinate Injectable 40 milliGRAM(s) IV Push every 8 hours  senna 2 Tablet(s) Oral at bedtime  vancomycin  IVPB      vancomycin  IVPB 1000 milliGRAM(s) IV Intermittent every 12 hours    MEDICATIONS  (PRN):  acetaminophen     Tablet .. 650 milliGRAM(s) Oral every 4 hours PRN Temp greater or equal to 38C (100.4F), Mild Pain (1 - 3), Moderate Pain (4 - 6)  ALBUTerol    90 MICROgram(s) HFA Inhaler 2 Puff(s) Inhalation every 4 hours PRN Shortness of Breath and/or Wheezing  benzonatate 200 milliGRAM(s) Oral three times a day PRN Cough  guaifenesin/dextromethorphan Oral Liquid 10 milliLiter(s) Oral every 4 hours PRN Cough    REVIEW OF SYSTEMS:  CONSTITUTIONAL: No fever, weight loss, or fatigue  RESPIRATORY: No cough, wheezing, chills or hemoptysis; No shortness of breath  CARDIOVASCULAR: No chest pain, palpitations, dizziness, or leg swelling  GASTROINTESTINAL: No abdominal or epigastric pain. No nausea, vomiting, or hematemesis; No diarrhea or constipation. No melena or hematochezia.  NEUROLOGICAL: No headaches, memory loss, loss of strength, numbness, or tremors  MUSCULOSKELETAL: No joint pain or swelling; No muscle, back, or extremity pain  SKIN: No rashes or lesions    Vital Signs Last 24 Hrs  T(C): 36.8 (06 Jan 2022 17:42), Max: 36.9 (06 Jan 2022 05:17)  T(F): 98.3 (06 Jan 2022 17:42), Max: 98.4 (06 Jan 2022 05:17)  HR: 58 (06 Jan 2022 17:42) (52 - 61)  BP: 101/63 (06 Jan 2022 17:42) (101/63 - 112/71)  BP(mean): --  RR: 20 (06 Jan 2022 17:42) (18 - 22)  SpO2: 100% (06 Jan 2022 17:42) (93% - 100%)    T(C): 36.8 (01-06-22 @ 17:42), Max: 36.9 (01-06-22 @ 05:17)  HR: 58 (01-06-22 @ 17:42) (52 - 61)  BP: 101/63 (01-06-22 @ 17:42) (101/63 - 112/71)  RR: 20 (01-06-22 @ 17:42) (18 - 22)  SpO2: 100% (01-06-22 @ 17:42) (93% - 100%)    PHYSICAL EXAM:  GENERAL: NAD, well-groomed, well-developed  HEAD:  Atraumatic, Normocephalic  EYES: EOMI, PERRLA, conjunctiva and sclera clear  NECK: Supple, No JVD  NERVOUS SYSTEM:  Alert & Oriented X3, No gross focal deficits  CHEST/LUNG: Clear to percussion bilaterally; No rales, rhonchi, wheezing, or rubs  HEART: Regular rate and rhythm; S1S2 present  ABDOMEN: Soft, Nontender, Nondistended; Bowel sounds present  EXTREMITIES:  No clubbing, cyanosis, or edema  SKIN: No rashes or lesions

## 2022-01-07 NOTE — PROGRESS NOTE ADULT - ASSESSMENT
LABS:    CBC Full  -  ( 06 Jan 2022 11:43 )  WBC Count : 16.40 K/uL  RBC Count : 4.37 M/uL  Hemoglobin : 13.1 g/dL  Hematocrit : 40.6 %  Platelet Count - Automated : 458 K/uL  Mean Cell Volume : 92.9 fL  Mean Cell Hemoglobin : 30.0 pg  Mean Cell Hemoglobin Concentration : 32.3 gm/dL  Auto Neutrophil # : x  Auto Lymphocyte # : x  Auto Monocyte # : x  Auto Eosinophil # : x  Auto Basophil # : x  Auto Neutrophil % : x  Auto Lymphocyte % : x  Auto Monocyte % : x  Auto Eosinophil % : x  Auto Basophil % : x                       13.1   16.40 )-----------( 458      ( 06 Jan 2022 11:43 )             40.6   01-07    134<L>  |  92<L>  |  34<H>  ----------------------------<  139<H>  3.5   |  33<H>  |  0.47<L>    Ca    9.7      07 Jan 2022 07:04  Phos  2.8     01-07  Mg     2.40     01-07    TPro  x   /  Alb  x   /  TBili  0.6  /  DBili  x   /  AST  x   /  ALT  x   /  AlkPhos  x   01-07    CAPILLARY BLOOD GLUCOSE    HEALTH ISSUES - PROBLEM Dx:  Acute respiratory failure with hypoxia    Pneumonia due to COVID-19 virus    Hypertension    Lower extremity edema    Need for prophylactic measure    Problem/Plan - 1:  ·  Problem: Acute respiratory failure with hypoxia.   ·  Plan: HFlNC  FIO2: 100%  Flow: 55 L/min.  O2 Sat.: 96--100%      Problem/Plan - 2:  ·  Problem: Pneumonia due to COVID-19 virus. / COPD  methylPREDNISolone sodium succinate  cefepime & vanco.  S/P  DEXAMETHASONE  REMDESIVIR  On  LOVENOX  ALBUTEROL.     Problem/Plan - 3:  ·  Problem: Hypertension.   ·  Plan: AMLODIPINE  LASIX  KCL.     Problem/Plan - 4:  ·  Problem: Lower extremity edema.   ·  Plan: LASIX  KCL.     Problem/Plan - 5:  ·  Problem: Need for prophylactic measure.   ·  Plan: As per orders.

## 2022-01-07 NOTE — PROGRESS NOTE ADULT - SUBJECTIVE AND OBJECTIVE BOX
RENNY PERSAUD Female  Patient is a 76y old  Female who presents with a chief complaint of + Acute hypoxic respiratory failure 2/2 COVID pneumonia (06 Jan 2022 23:31)      INTERVAL HPI/OVERNIGHT EVENTS:  HPI:  76 year old Female PMHx HTN, Asthma as a child (No hx of intubations/hospitalizations, Venous insufficiency, Lymphedema  Osteoarthritis presenting with fatigue, productive cough, and dyspnea. Patient report she was exposed to sick contact-  + COVID x 20 days ago. Patients  is currently admitted at [facility]. Patient states she did not have any symptoms however her sister who works as a nurse noted pt appeared dyspneic check Sp02 at home 66% She sent a rapid test today that came back + COVID. Notified PCP - Dr. Cherise Amos who recommended her to go to the hospital. Pt reports she took Zpak on 12/17 as a preventative measure and completed course. Endorses some decreased appetite and SOB now improved at rest on HFNC. Patient is unvaccinated, denies having COVID in the past. Has hx of chronic LE swelling is on PO lasix.  Denies hx of PE/DVT, fever, body aches, headache, vision change, chest pain, abdominal pain, nausea, vomiting, melena, hematochezia, dysuria, urinary frequency, calf pain.     In ED vitals: Temp 98F HR 75 /83 SP02 initially 70s on NC currently 95% on 15L NRB - escalated to HFNC  Labs- Ddimer - 532 Procalcitonin - 0.08 Ferritin 503  Patient is s/p PO tylenol and IV Decadron 6mg x1.  (23 Dec 2021 20:08)      FAMILY HISTORY:  Family history of heart disease (Father)      Allergies    Bactrim (Unknown)  Betadine (Unknown)  latex (Unknown)  sulfa drugs (Unknown)    Intolerances      MEDICATIONS  (STANDING):  amLODIPine   Tablet 5 milliGRAM(s) Oral daily  aspirin enteric coated 81 milliGRAM(s) Oral daily  cefepime   IVPB      cefepime   IVPB 2000 milliGRAM(s) IV Intermittent every 12 hours  chlorhexidine 2% Cloths 1 Application(s) Topical daily  dextrose 5%. 1000 milliLiter(s) (50 mL/Hr) IV Continuous <Continuous>  enoxaparin Injectable 80 milliGRAM(s) SubCutaneous every 12 hours  furosemide    Tablet 40 milliGRAM(s) Oral daily  gabapentin 200 milliGRAM(s) Oral two times a day  methylPREDNISolone sodium succinate Injectable 40 milliGRAM(s) IV Push every 8 hours  senna 2 Tablet(s) Oral at bedtime  vancomycin  IVPB      vancomycin  IVPB 1000 milliGRAM(s) IV Intermittent every 12 hours    MEDICATIONS  (PRN):  acetaminophen     Tablet .. 650 milliGRAM(s) Oral every 4 hours PRN Temp greater or equal to 38C (100.4F), Mild Pain (1 - 3), Moderate Pain (4 - 6)  ALBUTerol    90 MICROgram(s) HFA Inhaler 2 Puff(s) Inhalation every 4 hours PRN Shortness of Breath and/or Wheezing  benzonatate 200 milliGRAM(s) Oral three times a day PRN Cough  guaifenesin/dextromethorphan Oral Liquid 10 milliLiter(s) Oral every 4 hours PRN Cough    REVIEW OF SYSTEMS:  CONSTITUTIONAL: No fever, weight loss, or fatigue  RESPIRATORY: No cough, wheezing, chills or hemoptysis; No shortness of breath  CARDIOVASCULAR: No chest pain, palpitations, dizziness, or leg swelling  GASTROINTESTINAL: No abdominal or epigastric pain. No nausea, vomiting, or hematemesis; No diarrhea or constipation. No melena or hematochezia.  NEUROLOGICAL: No headaches, memory loss, loss of strength, numbness, or tremors  MUSCULOSKELETAL: No joint pain or swelling; No muscle, back, or extremity pain  SKIN: No rashes or lesions    Vital Signs Last 24 Hrs  T(C): 36.3 (07 Jan 2022 21:00), Max: 36.7 (07 Jan 2022 12:00)  T(F): 97.4 (07 Jan 2022 21:00), Max: 98 (07 Jan 2022 12:00)  HR: 56 (07 Jan 2022 21:00) (50 - 57)  BP: 130/67 (07 Jan 2022 21:00) (105/61 - 130/67)  BP(mean): --  RR: 20 (07 Jan 2022 21:00) (20 - 22)  SpO2: 100% (07 Jan 2022 21:00) (96% - 100%)    T(C): 36.3 (01-07-22 @ 21:00), Max: 36.7 (01-07-22 @ 12:00)  HR: 56 (01-07-22 @ 21:00) (50 - 57)  BP: 130/67 (01-07-22 @ 21:00) (105/61 - 130/67)  RR: 20 (01-07-22 @ 21:00) (20 - 22)  SpO2: 100% (01-07-22 @ 21:00) (96% - 100%)      PHYSICAL EXAM:  GENERAL: NAD, well-groomed, well-developed  HEAD:  Atraumatic, Normocephalic  EYES: EOMI, PERRLA, conjunctiva and sclera clear  NECK: Supple, No JVD  NERVOUS SYSTEM:  Alert & Oriented X3, No gross focal deficits  CHEST/LUNG: Clear to percussion bilaterally; No rales, rhonchi, wheezing, or rubs  HEART: Regular rate and rhythm; S1S2 present  ABDOMEN: Soft, Nontender, Nondistended; Bowel sounds present  EXTREMITIES:  No clubbing, cyanosis, or edema  SKIN: No rashes or lesions    I&O's Summary    07 Jan 2022 07:01  -  07 Jan 2022 22:51  --------------------------------------------------------  IN: 474 mL / OUT: 0 mL / NET: 474 mL

## 2022-01-08 NOTE — PROGRESS NOTE ADULT - ASSESSMENT
LABS:    CBC Full  -  ( 08 Jan 2022 08:34 )  WBC Count : 14.63 K/uL  RBC Count : 4.61 M/uL  Hemoglobin : 14.0 g/dL  Hematocrit : 42.7 %  Platelet Count - Automated : 384 K/uL  Mean Cell Volume : 92.6 fL  Mean Cell Hemoglobin : 30.4 pg  Mean Cell Hemoglobin Concentration : 32.8 gm/dL  Auto Neutrophil # : 12.13 K/uL  Auto Lymphocyte # : 1.15 K/uL  Auto Monocyte # : 1.06 K/uL  Auto Eosinophil # : 0.00 K/uL  Auto Basophil # : 0.04 K/uL  Auto Neutrophil % : 82.9 %  Auto Lymphocyte % : 7.9 %  Auto Monocyte % : 7.2 %  Auto Eosinophil % : 0.0 %  Auto Basophil % : 0.3 %                          14.0   14.63 )-----------( 384      ( 08 Jan 2022 08:34 )             42.7   01-08    131<L>  |  88<L>  |  31<H>  ----------------------------<  125<H>  3.8   |  28  |  0.45<L>    Ca    9.8      08 Jan 2022 08:34  Phos  2.5     01-08  Mg     2.50     01-08    TPro  x   /  Alb  x   /  TBili  0.6  /  DBili  x   /  AST  x   /  ALT  x   /  AlkPhos  x   01-07      HEALTH ISSUES - PROBLEM Dx:  Acute respiratory failure with hypoxia    Pneumonia due to COVID-19 virus    Hypertension    Lower extremity edema    Need for prophylactic measure    Problem/Plan - 1:  ·  Problem: Acute respiratory failure with hypoxia.   ·  Plan: HFlNC  FIO2: 100%  Flow: 55 L/min.  O2 Sat.: 95--100%      Problem/Plan - 2:  ·  Problem: Pneumonia due to COVID-19 virus. / COPD  methylPREDNISolone sodium succinate  cefepime & vanco.  S/P  DEXAMETHASONE  REMDESIVIR  On  LOVENOX  ALBUTEROL.     Problem/Plan - 3:  ·  Problem: Hypertension.   ·  Plan: AMLODIPINE  LASIX  KCL.     Problem/Plan - 4:  ·  Problem: Lower extremity edema.   ·  Plan: LASIX  KCL.     Problem/Plan - 5:  ·  Problem: Need for prophylactic measure.   ·  Plan: As per orders.

## 2022-01-08 NOTE — PROGRESS NOTE ADULT - SUBJECTIVE AND OBJECTIVE BOX
RENNY PERSAUD Female  Patient is a 76y old  Female who presents with a chief complaint of + Acute hypoxic respiratory failure 2/2 COVID pneumonia (07 Jan 2022 22:50)    INTERVAL HPI/OVERNIGHT EVENTS:  HPI:  76 year old Female PMHx HTN, Asthma as a child (No hx of intubations/hospitalizations, Venous insufficiency, Lymphedema  Osteoarthritis presenting with fatigue, productive cough, and dyspnea. Patient report she was exposed to sick contact-  + COVID x 20 days ago. Patients  is currently admitted at [facility]. Patient states she did not have any symptoms however her sister who works as a nurse noted pt appeared dyspneic check Sp02 at home 66% She sent a rapid test today that came back + COVID. Notified PCP - Dr. Cherise Amos who recommended her to go to the hospital. Pt reports she took Zpak on 12/17 as a preventative measure and completed course. Endorses some decreased appetite and SOB now improved at rest on HFNC. Patient is unvaccinated, denies having COVID in the past. Has hx of chronic LE swelling is on PO lasix.  Denies hx of PE/DVT, fever, body aches, headache, vision change, chest pain, abdominal pain, nausea, vomiting, melena, hematochezia, dysuria, urinary frequency, calf pain.     In ED vitals: Temp 98F HR 75 /83 SP02 initially 70s on NC currently 95% on 15L NRB - escalated to HFNC  Labs- Ddimer - 532 Procalcitonin - 0.08 Ferritin 503  Patient is s/p PO tylenol and IV Decadron 6mg x1.  (23 Dec 2021 20:08)      FAMILY HISTORY:  Family history of heart disease (Father)      Allergies    Bactrim (Unknown)  Betadine (Unknown)  latex (Unknown)  sulfa drugs (Unknown)    Intolerances      MEDICATIONS  (STANDING):  amLODIPine   Tablet 5 milliGRAM(s) Oral daily  aspirin enteric coated 81 milliGRAM(s) Oral daily  cefepime   IVPB      cefepime   IVPB 2000 milliGRAM(s) IV Intermittent every 12 hours  chlorhexidine 2% Cloths 1 Application(s) Topical daily  dextrose 5%. 1000 milliLiter(s) (50 mL/Hr) IV Continuous <Continuous>  enoxaparin Injectable 80 milliGRAM(s) SubCutaneous every 12 hours  furosemide    Tablet 40 milliGRAM(s) Oral daily  gabapentin 200 milliGRAM(s) Oral two times a day  methylPREDNISolone sodium succinate Injectable 40 milliGRAM(s) IV Push every 8 hours  senna 2 Tablet(s) Oral at bedtime  vancomycin  IVPB 750 milliGRAM(s) IV Intermittent every 12 hours    MEDICATIONS  (PRN):  acetaminophen     Tablet .. 650 milliGRAM(s) Oral every 4 hours PRN Temp greater or equal to 38C (100.4F), Mild Pain (1 - 3), Moderate Pain (4 - 6)  ALBUTerol    90 MICROgram(s) HFA Inhaler 2 Puff(s) Inhalation every 4 hours PRN Shortness of Breath and/or Wheezing  benzonatate 200 milliGRAM(s) Oral three times a day PRN Cough  guaifenesin/dextromethorphan Oral Liquid 10 milliLiter(s) Oral every 4 hours PRN Cough    REVIEW OF SYSTEMS:  CONSTITUTIONAL: No fever, weight loss, or fatigue  RESPIRATORY: No cough, wheezing, chills or hemoptysis; No shortness of breath  CARDIOVASCULAR: No chest pain, palpitations, dizziness, or leg swelling  GASTROINTESTINAL: No abdominal or epigastric pain. No nausea, vomiting, or hematemesis; No diarrhea or constipation. No melena or hematochezia.  NEUROLOGICAL: No headaches, memory loss, loss of strength, numbness, or tremors  MUSCULOSKELETAL: No joint pain or swelling; No muscle, back, or extremity pain  SKIN: No rashes or lesions    Vital Signs Last 24 Hrs  T(C): 36.2 (08 Jan 2022 21:00), Max: 36.7 (08 Jan 2022 14:10)  T(F): 97.2 (08 Jan 2022 21:00), Max: 98 (08 Jan 2022 14:10)  HR: 58 (08 Jan 2022 21:00) (49 - 64)  BP: 109/60 (08 Jan 2022 21:00) (109/60 - 124/68)  BP(mean): --  RR: 20 (08 Jan 2022 21:00) (20 - 21)  SpO2: 100% (08 Jan 2022 21:00) (95% - 100%)    T(C): 36.2 (01-08-22 @ 21:00), Max: 36.7 (01-08-22 @ 14:10)  HR: 58 (01-08-22 @ 21:00) (49 - 64)  BP: 109/60 (01-08-22 @ 21:00) (109/60 - 124/68)  RR: 20 (01-08-22 @ 21:00) (20 - 21)  SpO2: 100% (01-08-22 @ 21:00) (95% - 100%)    I&O's Summary    07 Jan 2022 07:01  -  08 Jan 2022 07:00  --------------------------------------------------------  IN: 474 mL / OUT: 0 mL / NET: 474 mL      PHYSICAL EXAM:  GENERAL: NAD, well-groomed, well-developed  HEAD:  Atraumatic, Normocephalic  EYES: EOMI, PERRLA, conjunctiva and sclera clear  NECK: Supple, No JVD  NERVOUS SYSTEM:  Alert & Oriented X3, No gross focal deficits  CHEST/LUNG: Clear to percussion bilaterally; No rales, rhonchi, wheezing, or rubs  HEART: Regular rate and rhythm; S1S2 present  ABDOMEN: Soft, Nontender, Nondistended; Bowel sounds present  EXTREMITIES:  No clubbing, cyanosis, or edema  SKIN: No rashes or lesions

## 2022-01-09 NOTE — PROGRESS NOTE ADULT - ASSESSMENT
LABS:    CBC Full  -  ( 09 Jan 2022 08:07 )  WBC Count : 17.89 K/uL  RBC Count : 4.75 M/uL  Hemoglobin : 14.3 g/dL  Hematocrit : 42.3 %  Platelet Count - Automated : 345 K/uL  Mean Cell Volume : 89.1 fL  Mean Cell Hemoglobin : 30.1 pg  Mean Cell Hemoglobin Concentration : 33.8 gm/dL  Auto Neutrophil # : 14.93 K/uL  Auto Lymphocyte # : 1.50 K/uL  Auto Monocyte # : 1.09 K/uL  Auto Eosinophil # : 0.01 K/uL  Auto Basophil # : 0.03 K/uL  Auto Neutrophil % : 83.4 %  Auto Lymphocyte % : 8.4 %  Auto Monocyte % : 6.1 %  Auto Eosinophil % : 0.1 %  Auto Basophil % : 0.2 %                        14.3   17.89 )-----------( 345      ( 09 Jan 2022 08:07 )             42.3   01-09    130<L>  |  85<L>  |  27<H>  ----------------------------<  145<H>  3.8   |  33<H>  |  0.47<L>    Ca    9.7      09 Jan 2022 08:07  Phos  2.1     01-09  Mg     2.40     01-09      Problem/Plan - 1:  ·  Problem: Acute respiratory failure with hypoxia.   ·  Plan: HFlNC  FIO2: 100%  Flow: 55 L/min.  O2 Sat. : 100%      Problem/Plan - 2:  ·  Problem: Pneumonia due to COVID-19 virus. / COPD  methylPREDNISolone sodium succinate  cefepime & vanco.  S/P  DEXAMETHASONE  REMDESIVIR  On  LOVENOX  ALBUTEROL.     Problem/Plan - 3:  ·  Problem: Hypertension.   ·  Plan: AMLODIPINE  LASIX  KCL.     Problem/Plan - 4:  ·  Problem: Lower extremity edema.   ·  Plan: LASIX  KCL.     Problem/Plan - 5:  ·  Problem: Need for prophylactic measure.   ·  Plan: As per orders.          CAPILLARY BLOOD GLUCOSE      POCT Blood Glucose.: 168 mg/dL (09 Jan 2022 13:02)      HEALTH ISSUES - PROBLEM Dx:  Acute respiratory failure with hypoxia    Pneumonia due to COVID-19 virus    Hypertension    Lower extremity edema    Need for prophylactic measure

## 2022-01-09 NOTE — PROGRESS NOTE ADULT - SUBJECTIVE AND OBJECTIVE BOX
RENNY PERSAUD Female  Patient is a 76y old  Female who presents with a chief complaint of + Acute hypoxic respiratory failure 2/2 COVID pneumonia (08 Jan 2022 22:36)    INTERVAL HPI/OVERNIGHT EVENTS:  HPI:  76 year old Female PMHx HTN, Asthma as a child (No hx of intubations/hospitalizations, Venous insufficiency, Lymphedema  Osteoarthritis presenting with fatigue, productive cough, and dyspnea. Patient report she was exposed to sick contact-  + COVID x 20 days ago. Patients  is currently admitted at [facility]. Patient states she did not have any symptoms however her sister who works as a nurse noted pt appeared dyspneic check Sp02 at home 66% She sent a rapid test today that came back + COVID. Notified PCP - Dr. Cherise Amos who recommended her to go to the hospital. Pt reports she took Zpak on 12/17 as a preventative measure and completed course. Endorses some decreased appetite and SOB now improved at rest on HFNC. Patient is unvaccinated, denies having COVID in the past. Has hx of chronic LE swelling is on PO lasix.  Denies hx of PE/DVT, fever, body aches, headache, vision change, chest pain, abdominal pain, nausea, vomiting, melena, hematochezia, dysuria, urinary frequency, calf pain.     In ED vitals: Temp 98F HR 75 /83 SP02 initially 70s on NC currently 95% on 15L NRB - escalated to HFNC  Labs- Ddimer - 532 Procalcitonin - 0.08 Ferritin 503  Patient is s/p PO tylenol and IV Decadron 6mg x1.  (23 Dec 2021 20:08)      FAMILY HISTORY:  Family history of heart disease (Father)      Allergies    Bactrim (Unknown)  Betadine (Unknown)  latex (Unknown)  sulfa drugs (Unknown)    Intolerances      MEDICATIONS  (STANDING):  amLODIPine   Tablet 5 milliGRAM(s) Oral daily  aspirin enteric coated 81 milliGRAM(s) Oral daily  cefepime   IVPB      cefepime   IVPB 2000 milliGRAM(s) IV Intermittent every 12 hours  chlorhexidine 2% Cloths 1 Application(s) Topical daily  dextrose 5%. 1000 milliLiter(s) (50 mL/Hr) IV Continuous <Continuous>  enoxaparin Injectable 80 milliGRAM(s) SubCutaneous every 12 hours  furosemide    Tablet 40 milliGRAM(s) Oral daily  gabapentin 200 milliGRAM(s) Oral two times a day  methylPREDNISolone sodium succinate Injectable 40 milliGRAM(s) IV Push every 8 hours  senna 2 Tablet(s) Oral at bedtime  vancomycin  IVPB 750 milliGRAM(s) IV Intermittent every 12 hours    MEDICATIONS  (PRN):  acetaminophen     Tablet .. 650 milliGRAM(s) Oral every 4 hours PRN Temp greater or equal to 38C (100.4F), Mild Pain (1 - 3), Moderate Pain (4 - 6)  ALBUTerol    90 MICROgram(s) HFA Inhaler 2 Puff(s) Inhalation every 4 hours PRN Shortness of Breath and/or Wheezing  benzonatate 200 milliGRAM(s) Oral three times a day PRN Cough  guaifenesin/dextromethorphan Oral Liquid 10 milliLiter(s) Oral every 4 hours PRN Cough    REVIEW OF SYSTEMS:  CONSTITUTIONAL: No fever, weight loss, or fatigue  RESPIRATORY: No cough, wheezing, chills or hemoptysis; No shortness of breath  CARDIOVASCULAR: No chest pain, palpitations, dizziness, or leg swelling  GASTROINTESTINAL: No abdominal or epigastric pain. No nausea, vomiting, or hematemesis; No diarrhea or constipation. No melena or hematochezia.  NEUROLOGICAL: No headaches, memory loss, loss of strength, numbness, or tremors  MUSCULOSKELETAL: No joint pain or swelling; No muscle, back, or extremity pain  SKIN: No rashes or lesions    Vital Signs Last 24 Hrs  T(C): 36.4 (09 Jan 2022 13:45), Max: 36.4 (09 Jan 2022 05:00)  T(F): 97.5 (09 Jan 2022 13:45), Max: 97.6 (09 Jan 2022 05:00)  HR: 85 (09 Jan 2022 19:15) (58 - 85)  BP: 110/65 (09 Jan 2022 13:45) (110/65 - 114/66)  BP(mean): --  RR: 18 (09 Jan 2022 19:15) (17 - 20)  SpO2: 100% (09 Jan 2022 19:15) (96% - 100%)    T(C): 36.4 (01-09-22 @ 13:45), Max: 36.4 (01-09-22 @ 05:00)  HR: 85 (01-09-22 @ 19:15) (58 - 85)  BP: 110/65 (01-09-22 @ 13:45) (110/65 - 114/66)  RR: 18 (01-09-22 @ 19:15) (17 - 20)  SpO2: 100% (01-09-22 @ 19:15) (96% - 100%)    PHYSICAL EXAM:  GENERAL: NAD, well-groomed, well-developed  HEAD:  Atraumatic, Normocephalic  EYES: EOMI, PERRLA, conjunctiva and sclera clear  NECK: Supple, No JVD  NERVOUS SYSTEM:  Alert & Oriented X3, No gross focal deficits  CHEST/LUNG: Clear to percussion bilaterally; No rales, rhonchi, wheezing, or rubs  HEART: Regular rate and rhythm; S1S2 present  ABDOMEN: Soft, Nontender, Nondistended; Bowel sounds present  EXTREMITIES:  No clubbing, cyanosis, or edema  SKIN: No rashes or lesions

## 2022-01-10 NOTE — PROVIDER CONTACT NOTE (OTHER) - BACKGROUND
patient has lymphedema OA p/w resp failure 2/2 COVID.
patient admitted for COVID
patient admitted for infection due to severe acute respiratory syndrome coronavirus 2 (SARS-CoV-2).
76F admitted for SARS-CoV-2. s/p trial on HiFlo.
patient admitted for COVID
patient admitted for COVID
patient has lymphedema OA p/w resp failure 2/2 COVID.
no

## 2022-01-10 NOTE — PROVIDER CONTACT NOTE (OTHER) - NAME OF MD/NP/PA/DO NOTIFIED:
Michelle Sarah
Blair Barrios
Saadia Rodriguez ACP
Blair Barrios
Blair Barrios
Saadia Rodriguez ACP
Minerva Redmond

## 2022-01-10 NOTE — PROGRESS NOTE ADULT - SUBJECTIVE AND OBJECTIVE BOX
RENNY PERSAUD Female  Patient is a 76y old  Female who presents with a chief complaint of + Acute hypoxic respiratory failure 2/2 COVID pneumonia (10 George 2022 22:34)      INTERVAL HPI/OVERNIGHT EVENTS:  HPI:  76 year old Female PMHx HTN, Asthma as a child (No hx of intubations/hospitalizations, Venous insufficiency, Lymphedema  Osteoarthritis presenting with fatigue, productive cough, and dyspnea. Patient report she was exposed to sick contact-  + COVID x 20 days ago. Patients  is currently admitted at [facility]. Patient states she did not have any symptoms however her sister who works as a nurse noted pt appeared dyspneic check Sp02 at home 66% She sent a rapid test today that came back + COVID. Notified PCP - Dr. Cherise Amos who recommended her to go to the hospital. Pt reports she took Zpak on 12/17 as a preventative measure and completed course. Endorses some decreased appetite and SOB now improved at rest on HFNC. Patient is unvaccinated, denies having COVID in the past. Has hx of chronic LE swelling is on PO lasix.  Denies hx of PE/DVT, fever, body aches, headache, vision change, chest pain, abdominal pain, nausea, vomiting, melena, hematochezia, dysuria, urinary frequency, calf pain.     In ED vitals: Temp 98F HR 75 /83 SP02 initially 70s on NC currently 95% on 15L NRB - escalated to HFNC  Labs- Ddimer - 532 Procalcitonin - 0.08 Ferritin 503  Patient is s/p PO tylenol and IV Decadron 6mg x1.  (23 Dec 2021 20:08)      FAMILY HISTORY:  Family history of heart disease (Father)      Allergies    Bactrim (Unknown)  Betadine (Unknown)  latex (Unknown)  sulfa drugs (Unknown)    Intolerances      MEDICATIONS  (STANDING):  amLODIPine   Tablet 5 milliGRAM(s) Oral daily  aspirin enteric coated 81 milliGRAM(s) Oral daily  cefepime   IVPB      cefepime   IVPB 2000 milliGRAM(s) IV Intermittent every 12 hours  chlorhexidine 2% Cloths 1 Application(s) Topical daily  dextrose 5%. 1000 milliLiter(s) (50 mL/Hr) IV Continuous <Continuous>  enoxaparin Injectable 80 milliGRAM(s) SubCutaneous every 12 hours  gabapentin 200 milliGRAM(s) Oral two times a day  methylPREDNISolone sodium succinate Injectable 40 milliGRAM(s) IV Push every 12 hours  potassium phosphate / sodium phosphate Powder (PHOS-NaK) 1 Packet(s) Oral two times a day before meals  senna 2 Tablet(s) Oral at bedtime  vancomycin  IVPB 750 milliGRAM(s) IV Intermittent every 12 hours    MEDICATIONS  (PRN):  acetaminophen     Tablet .. 650 milliGRAM(s) Oral every 4 hours PRN Temp greater or equal to 38C (100.4F), Mild Pain (1 - 3), Moderate Pain (4 - 6)  ALBUTerol    90 MICROgram(s) HFA Inhaler 2 Puff(s) Inhalation every 4 hours PRN Shortness of Breath and/or Wheezing  benzonatate 200 milliGRAM(s) Oral three times a day PRN Cough  guaifenesin/dextromethorphan Oral Liquid 10 milliLiter(s) Oral every 4 hours PRN Cough  sodium chloride 0.65% Nasal 1 Spray(s) Both Nostrils every 4 hours PRN High flow oxygen causing dry nose    REVIEW OF SYSTEMS:  CONSTITUTIONAL: No fever, weight loss, or fatigue  RESPIRATORY: No cough, wheezing, chills or hemoptysis; No shortness of breath  CARDIOVASCULAR: No chest pain, palpitations, dizziness, or leg swelling  GASTROINTESTINAL: No abdominal or epigastric pain. No nausea, vomiting, or hematemesis; No diarrhea or constipation. No melena or hematochezia.  NEUROLOGICAL: No headaches, memory loss, loss of strength, numbness, or tremors  MUSCULOSKELETAL: No joint pain or swelling; No muscle, back, or extremity pain  SKIN: No rashes or lesions    Vital Signs Last 24 Hrs  T(C): 36.2 (10 George 2022 21:10), Max: 36.4 (10 George 2022 17:46)  T(F): 97.2 (10 George 2022 21:10), Max: 97.6 (10 George 2022 17:46)  HR: 71 (10 George 2022 21:10) (55 - 71)  BP: 104/61 (10 George 2022 21:10) (104/61 - 109/63)  BP(mean): --  RR: 18 (10 George 2022 22:23) (18 - 20)  SpO2: 96% (10 George 2022 22:23) (95% - 100%)    T(C): 36.2 (01-10-22 @ 21:10), Max: 36.4 (01-10-22 @ 17:46)  HR: 71 (01-10-22 @ 21:10) (55 - 71)  BP: 104/61 (01-10-22 @ 21:10) (104/61 - 109/63)  RR: 18 (01-10-22 @ 22:23) (18 - 20)  SpO2: 96% (01-10-22 @ 22:23) (95% - 100%)    PHYSICAL EXAM:  GENERAL: NAD, well-groomed, well-developed  HEAD:  Atraumatic, Normocephalic  EYES: EOMI, PERRLA, conjunctiva and sclera clear  NECK: Supple, No JVD  NERVOUS SYSTEM:  Alert & Oriented X3, No gross focal deficits  CHEST/LUNG: Clear to percussion bilaterally; No rales, rhonchi, wheezing, or rubs  HEART: Regular rate and rhythm; S1S2 present  ABDOMEN: Soft, Nontender, Nondistended; Bowel sounds present  EXTREMITIES:  No clubbing, cyanosis, or edema  SKIN: No rashes or lesions

## 2022-01-10 NOTE — PROVIDER CONTACT NOTE (OTHER) - REASON
pt c/o heavy pain to chest (pain level 5/10)
left upper arm hardened and ecchymotic.
patient had destated to 78% while eating. patient had removed her mask
patient had destated to 83% while eating. patient had removed her mask
pt desats and sustained 78-80% when switched to hiFlo
patient refused lovenox
spo2 below parameters

## 2022-01-10 NOTE — PROGRESS NOTE ADULT - ASSESSMENT
Assessment: 76 year old Female PMHx HTN, Asthma as a child (No hx of intubations/hospitalizations, Venous insufficiency, Lymphedema  Osteoarthritis) presenting with fatigue, productive cough, and dyspnea. Patient report she was exposed to sick contact-  + COVID. Patients  was admitted at [facility], has since been discharged home. Patient is unvaccinated, denies having COVID in the past. Has hx of chronic LE swelling is on PO lasix.  Denies hx of PE/DVT, fever, body aches, headache, vision change, chest pain, abdominal pain, nausea, vomiting, melena, hematochezia, dysuria, urinary frequency, calf pain. Admitted with acute hypoxic respiratory failure 2/2 COVID pneumonia. Previously on BiPAP, now on high flow nasal cannula. Wound f/u to assist with management of device related unstagable pressure injury to nasal bridge and mid-anterior chest with a cluster of category three skin tears.     Plan:  Unstagable pressure injury to nasal bridge:  -Cleanse with NS, pat dry.  -Liquid barrier film daily, leave DUNG.    Mid-anterior chest category 3 skin tears:  -Cleanse with NS, pat dry. Apply Liquid barrier film to periwound skin. Apply silicone foam with border. Change every other day.    Friable/dry nasal and oral mucosa:  -Provide oral care per protocol, apply sween 24 to base of nares and lips every shift and prn.     Risk for incontinence associated dermatitis:  -Provide perineal care every shift and prn with episodes of incontinence  -Apply critic-aid moisture barrier ointment  -Continue low airloss support surface.  -Continue to turn and position every 2 hours with z-nino fluidized positioning device.  -Continue use of Complete Cair pressure offloading boots.  -Continue Nutritional management as per RD recommendations.  -VTE prophlyaxis - Lovenox    Care per primary team    Upon discharge follow up at outpatient [facility]. [address] [facility]. [phone].  Thank you.    Patient seen Dr. Samson discussed findings and plan with primary team. Re-iterated to patient possibility of scaring to nasal bridge.  ANDREE Momin-BC, CW    pager #[phone]    If after 4PM or before 7:30AM on Mon-Friday or weekend/holiday please contact general surgery for urgent matters.   Team A- [id number]   Team B- [id number]  For non-urgent matters e-mail [email or web address]    We spent 55 minutes face-to-face with this patient of which more than 50% of the time was spent counseling/coordinating care of this patient.       Assessment: 76 year old Female  with nasal and chest wall pressure injuries/abrasions   PMHx HTN, Asthma as a child (No hx of intubations/hospitalizations, Venous insufficiency, Lymphedema  Osteoarthritis)  + COVID.    hx of chronic LE swelling is on PO lasix.    now acute hypoxic respiratory failure 2/2 COVID pneumonia. s/p BiPAP, now on high flow nasal cannula.   Wound f/u to assist with management of device related unstagable pressure injury to nasal bridge and mid-anterior chest with a cluster of category three skin tears.   ddimer, bnp and ferritin past peak, covid with very high wbc- on steroids cefapimine and vanco consider urine culture/ repeat cxr  Plan:  Unstagable pressure injury to nasal bridge:  -Cleanse with NS, pat dry.  -Liquid barrier film daily, leave DUNG.    Mid-anterior chest category 3 skin tears:  -Cleanse with NS, pat dry. Apply Liquid barrier film to periwound skin. Apply silicone foam with border. Change every other day.    Friable/dry nasal and oral mucosa:  -Provide oral care per protocol, apply sween 24 to base of nares and lips every shift and prn.     Risk for incontinence associated dermatitis:  -Provide perineal care every shift and prn with episodes of incontinence  -Apply critic-aid moisture barrier ointment  -Continue low airloss support surface.  -Continue to turn and position every 2 hours with z-nino fluidized positioning device.  -Continue use of Complete Cair pressure offloading boots.  -Continue Nutritional management as per RD recommendations.  -VTE prophlyaxis - Lovenox    Care per primary team    Upon discharge follow up at outpatient [facility]. [address] [place] [address]. [phone].  Thank you.    Patient seen Dr. Samson discussed findings and plan with primary team. Re-iterated to patient possibility of scaring to nasal bridge.  ANDREE Momin-BC, [facility]    pager #[phone]    If after 4PM or before 7:30AM on Mon-Friday or weekend/holiday please contact general surgery for urgent matters.   Team A- [id number]   Team B- [id number]  For non-urgent matters e-mail [email or web address]    We spent 55 minutes face-to-face with this patient of which more than 50% of the time was spent counseling/coordinating care of this patient.

## 2022-01-10 NOTE — PROGRESS NOTE ADULT - ASSESSMENT
LABS:                        14.6   23.45 )-----------( 338      ( 10 George 2022 07:11 )             41.9   01-10    127<L>  |  84<L>  |  27<H>  ----------------------------<  163<H>  3.2<L>   |  33<H>  |  0.49<L>    Ca    9.1      10 George 2022 07:11  Phos  2.1     01-10  Mg     2.20     01-10    CBC Full  -  ( 10 George 2022 07:11 )  WBC Count : 23.45 K/uL  RBC Count : 4.79 M/uL  Hemoglobin : 14.6 g/dL  Hematocrit : 41.9 %  Platelet Count - Automated : 338 K/uL  Mean Cell Volume : 87.5 fL  Mean Cell Hemoglobin : 30.5 pg  Mean Cell Hemoglobin Concentration : 34.8 gm/dL  Auto Neutrophil # : 19.83 K/uL  Auto Lymphocyte # : 1.61 K/uL  Auto Monocyte # : 1.39 K/uL  Auto Eosinophil # : 0.00 K/uL  Auto Basophil # : 0.07 K/uL  Auto Neutrophil % : 84.6 %  Auto Lymphocyte % : 6.9 %  Auto Monocyte % : 5.9 %  Auto Eosinophil % : 0.0 %  Auto Basophil % : 0.3 %    HEALTH ISSUES - PROBLEM Dx:  Acute respiratory failure with hypoxia    Pneumonia due to COVID-19 virus    Hypertension    Lower extremity edema    Need for prophylactic measure    CAPILLARY BLOOD GLUCOSE    POCT Blood Glucose.: 163 mg/dL (10 George 2022 08:56)    Problem/Plan - 1:  ·  Problem: Acute respiratory failure with hypoxia.   ·  Plan: HFlNC  FIO2: 100%-> 80%  Flow: 55 L/min. -> 45 L/min.  O2 Sat. : 96% - 97%     Problem/Plan - 2:  ·  Problem: Pneumonia due to COVID-19 virus. / COPD  methylPREDNISolone sodium succinate  cefepime & vanco.  S/P  DEXAMETHASONE  REMDESIVIR  On  LOVENOX  ALBUTEROL.     Problem/Plan - 3:  ·  Problem: Hypertension.   ·  Plan: AMLODIPINE  LASIX  KCL.     Problem/Plan - 4:  ·  Problem: Lower extremity edema.   ·  Plan: LASIX  KCL.     Problem/Plan - 5:  ·  Problem: Need for prophylactic measure.   ·  Plan: As per orders.

## 2022-01-10 NOTE — PROGRESS NOTE ADULT - ASSESSMENT
LABS:                        14.6   23.45 )-----------( 338      ( 10 George 2022 07:11 )             41.9   01-10    127<L>  |  84<L>  |  27<H>  ----------------------------<  163<H>  3.2<L>   |  33<H>  |  0.49<L>    Ca    9.1      10 George 2022 07:11  Phos  2.1     01-10  Mg     2.20     01-10    CAPILLARY BLOOD GLUCOSE    POCT Blood Glucose.: 163 mg/dL (10 George 2022 08:56)    CBC Full  -  ( 10 George 2022 07:11 )  WBC Count : 23.45 K/uL  RBC Count : 4.79 M/uL  Hemoglobin : 14.6 g/dL  Hematocrit : 41.9 %  Platelet Count - Automated : 338 K/uL  Mean Cell Volume : 87.5 fL  Mean Cell Hemoglobin : 30.5 pg  Mean Cell Hemoglobin Concentration : 34.8 gm/dL  Auto Neutrophil # : 19.83 K/uL  Auto Lymphocyte # : 1.61 K/uL  Auto Monocyte # : 1.39 K/uL  Auto Eosinophil # : 0.00 K/uL  Auto Basophil # : 0.07 K/uL  Auto Neutrophil % : 84.6 %  Auto Lymphocyte % : 6.9 %  Auto Monocyte % : 5.9 %  Auto Eosinophil % : 0.0 %  Auto Basophil % : 0.3 %    HEALTH ISSUES - PROBLEM Dx:  Acute respiratory failure with hypoxia    Pneumonia due to COVID-19 virus    Hypertension    Lower extremity edema    Need for prophylactic measure    Problem/Plan - 1:  ·  Problem: Acute respiratory failure with hypoxia.   ·  Plan: HFlNC  FIO2: 100% -> 80%  Flow: 55 L/min.--> 45 L/min  O2 Sat. 96% - 97%     Problem/Plan - 2:  ·  Problem: Pneumonia due to COVID-19 virus. / COPD  methylPREDNISolone sodium succinate  cefepime & vanco.  S/P  DEXAMETHASONE  REMDESIVIR  On  LOVENOX  ALBUTEROL.     Problem/Plan - 3:  ·  Problem: Hypertension.   ·  Plan: AMLODIPINE  LASIX  KCL.     Problem/Plan - 4:  ·  Problem: Lower extremity edema.   ·  Plan: LASIX  KCL.     Problem/Plan - 5:  ·  Problem: Need for prophylactic measure.   ·  Plan: As per orders.

## 2022-01-10 NOTE — PROGRESS NOTE ADULT - SUBJECTIVE AND OBJECTIVE BOX
[facility]-- WOUND TEAM -- FOLLOW UP NOTE  --------------------------------------------------------------------------------    Subjective: Patient requested patient care associate to draw blood. "I shouldn't have come here, my daughter brought me here."   Denies pain and/or tenderness to nasal bridge, unable to report etiology of new chest wounds.    24 hour events: Chart reviewed including labs and relevant images      Diet:  Diet, Regular:   DASH/TLC Sodium & Cholesterol Restricted (DASH) (01-06-22 @ 10:10)      ROS: General/ SKIN see subjective  Respiratory: denies SOB  all other systems negative      ALLERGIES & MEDICATIONS  --------------------------------------------------------------------------------  Allergies    Bactrim (Unknown)  Betadine (Unknown)  latex (Unknown)  sulfa drugs (Unknown)    Intolerances          STANDING INPATIENT MEDICATIONS    amLODIPine   Tablet 5 milliGRAM(s) Oral daily  aspirin enteric coated 81 milliGRAM(s) Oral daily  cefepime   IVPB      cefepime   IVPB 2000 milliGRAM(s) IV Intermittent every 12 hours  chlorhexidine 2% Cloths 1 Application(s) Topical daily  dextrose 5%. 1000 milliLiter(s) IV Continuous <Continuous>  enoxaparin Injectable 80 milliGRAM(s) SubCutaneous every 12 hours  furosemide    Tablet 40 milliGRAM(s) Oral daily  gabapentin 200 milliGRAM(s) Oral two times a day  methylPREDNISolone sodium succinate Injectable 40 milliGRAM(s) IV Push every 8 hours  potassium phosphate / sodium phosphate Powder (PHOS-NaK) 1 Packet(s) Oral two times a day before meals  senna 2 Tablet(s) Oral at bedtime  vancomycin  IVPB 750 milliGRAM(s) IV Intermittent every 12 hours      PRN INPATIENT MEDICATION  acetaminophen     Tablet .. 650 milliGRAM(s) Oral every 4 hours PRN  ALBUTerol    90 MICROgram(s) HFA Inhaler 2 Puff(s) Inhalation every 4 hours PRN  benzonatate 200 milliGRAM(s) Oral three times a day PRN  guaifenesin/dextromethorphan Oral Liquid 10 milliLiter(s) Oral every 4 hours PRN        Vital signs:  T(C): 36.3 (01-10-22 @ 05:00), Max: 36.4 (01-09-22 @ 13:45)  HR: 70 (01-10-22 @ 11:08) (55 - 85)  BP: 109/63 (01-10-22 @ 05:00) (109/63 - 115/72)  RR: 20 (01-10-22 @ 11:08) (16 - 20)  SpO2: 97% (01-10-22 @ 11:08) (96% - 100%)  BMI: 34.3 kg/m2          Physical exam:  General: NAD, A & O x 3, frus  HEENT: NC/NT, mucosa moist/dry. Poor dentation.  GI: Soft, NT/ND. + BS. Fecal incontinence.  : Indwelling brock catheter, condom cathete, penile pouch  Vascular: No temperature changes noted. Increased coolness from midfoot to distal toes. + (  ) DP/PT pulses. Capillary refill < 3 seconds. + Edema bilaterally. Thickened toenails. (+) hemosiderin staining.  Skin: moist, adequate skin turgor. Dry, poor skin turgor. Frail.  Psych: mood and demeanor appropriate      LABS/ CULTURES/ RADIOLOGY:              14.6   23.45 >-----------<  338      [01-10-22 @ 07:11]              41.9     127  |  84  |  27  ----------------------------<  163      [01-10-22 @ 07:11]  3.2   |  33  |  0.49        Ca     9.1     [01-10-22 @ 07:11]      Mg     2.20     [01-10-22 @ 07:11]      Phos  2.1     [01-10-22 @ 07:11]                CAPILLARY BLOOD GLUCOSE      POCT Blood Glucose.: 163 mg/dL (10 George 2022 08:56)  POCT Blood Glucose.: 168 mg/dL (09 Jan 2022 13:02)        Triglycerides, Serum: 120 mg/dL (12-24-21 @ 07:27)            Culture - Blood (collected 01-03-22 @ 19:03)  Source: .Blood Blood-Peripheral  Final Report (01-08-22 @ 23:01):    No Growth Final    Culture - Blood (collected 01-03-22 @ 19:01)  Source: .Blood Blood-Peripheral  Final Report (01-08-22 @ 23:01):    No Growth Final          A1C with Estimated Average Glucose Result: 5.7 % (12-24-21 @ 07:27)        Assessment:      Plan:      Continue low airloss support surface.  Continue to turn and position every 2 hours with z-nino fluidized positioning device.  Continue use of Complete Cair pressure offloading boots.  Continue Nutritional management as per RD recommendations.    Upon discharge follow up at outpatient [facility] [place]. [address] [place] [address]. [phone].    Will continue to follow while inpatient.  Thank you.    Patient seen with Dr. Coley/Dr. Samson discussed findings and plan with primary team.  ANDREE Momin-BC, CWOC    pager #[phone]    If after 4PM or before 7:30AM on Mon-Friday or weekend/holiday please contact general surgery for urgent matters.   Team A- [id number]   Team B- [id number]  For non-urgent matters e-mail [email or web address]    I/We spent 30 minutes face-to-face with this patient of which more than 50% of the time was spent counseling/coordinating care of this patient.       [facility]-- WOUND TEAM -- FOLLOW UP NOTE  --------------------------------------------------------------------------------    Patient seen today at bedside on COVID isolation precautions.    Subjective: Patient requested patient care associate to draw blood. "I shouldn't have come here, my daughter brought me here."   Denies pain and/or tenderness to nasal bridge, unable to report etiology of new chest wounds.    Interval HPI/ 24 hour events:   HPI:  76 year old Female PMHx HTN, Asthma as a child (No hx of intubations/hospitalizations, Venous insufficiency, Lymphedema  Osteoarthritis presenting with fatigue, productive cough, and dyspnea. Patient report she was exposed to sick contact-  + COVID x 20 days ago. Patients  is currently admitted at [facility]. Patient states she did not have any symptoms however her sister who works as a nurse noted pt appeared dyspneic check Sp02 at home 66% She sent a rapid test today that came back + COVID. Notified PCP - Dr. Cherise Amos who recommended her to go to the hospital. Pt reports she took Zpak on 12/17 as a preventative measure and completed course. Endorses some decreased appetite and SOB now improved at rest on HFNC. Patient is unvaccinated, denies having COVID in the past. Has hx of chronic LE swelling is on PO lasix.  Denies hx of PE/DVT, fever, body aches, headache, vision change, chest pain, abdominal pain, nausea, vomiting, melena, hematochezia, dysuria, urinary frequency, calf pain.     In ED vitals: Temp 98F HR 75 /83 SP02 initially 70s on NC currently 95% on 15L NRB - escalated to HFNC  Labs- Ddimer - 532 Procalcitonin - 0.08 Ferritin 503  Patient is s/p PO tylenol and IV Decadron 6mg x1.  (23 Dec 2021 20:08)    Chart reviewed including labs and relevant images      Diet:  Diet, Regular:   DASH/TLC Sodium & Cholesterol Restricted (DASH) (01-06-22 @ 10:10)      ROS: General/ SKIN see subjective  Respiratory: denies SOB  all other systems negative      ALLERGIES & MEDICATIONS  --------------------------------------------------------------------------------  Allergies    Bactrim (Unknown)  Betadine (Unknown)  latex (Unknown)  sulfa drugs (Unknown)    Intolerances          STANDING INPATIENT MEDICATIONS    amLODIPine   Tablet 5 milliGRAM(s) Oral daily  aspirin enteric coated 81 milliGRAM(s) Oral daily  cefepime   IVPB      cefepime   IVPB 2000 milliGRAM(s) IV Intermittent every 12 hours  chlorhexidine 2% Cloths 1 Application(s) Topical daily  dextrose 5%. 1000 milliLiter(s) IV Continuous <Continuous>  enoxaparin Injectable 80 milliGRAM(s) SubCutaneous every 12 hours  furosemide    Tablet 40 milliGRAM(s) Oral daily  gabapentin 200 milliGRAM(s) Oral two times a day  methylPREDNISolone sodium succinate Injectable 40 milliGRAM(s) IV Push every 8 hours  potassium phosphate / sodium phosphate Powder (PHOS-NaK) 1 Packet(s) Oral two times a day before meals  senna 2 Tablet(s) Oral at bedtime  vancomycin  IVPB 750 milliGRAM(s) IV Intermittent every 12 hours      PRN INPATIENT MEDICATION  acetaminophen     Tablet .. 650 milliGRAM(s) Oral every 4 hours PRN  ALBUTerol    90 MICROgram(s) HFA Inhaler 2 Puff(s) Inhalation every 4 hours PRN  benzonatate 200 milliGRAM(s) Oral three times a day PRN  guaifenesin/dextromethorphan Oral Liquid 10 milliLiter(s) Oral every 4 hours PRN        Vital signs:  T(C): 36.3 (01-10-22 @ 05:00), Max: 36.4 (01-09-22 @ 13:45)  HR: 70 (01-10-22 @ 11:08) (55 - 85)  BP: 109/63 (01-10-22 @ 05:00) (109/63 - 115/72)  RR: 20 (01-10-22 @ 11:08) (16 - 20)  SpO2: 97% (01-10-22 @ 11:08) (96% - 100%)  BMI: 34.3 kg/m2          Physical exam:  General: NAD, A & O x 3,   HEENT: NC/NT, dry friable nasal and oral mucosa  Respiratory: supplemental oxygen via high flow nasal cannula  GI: Soft, NT/ND, fecal incontinence  : urinary incontinence, external female urinary collection device in place   Skin: moist, frail.  Nasal bridge unstagable pressure injury- 1.5cmx1.5cmx0.2cm, minimal moist eschar, stable. liquid barrier film applied.  Mid-upper chest- multiple category three skin tears- cluster [id number], pink-red friable dermis.  Psych: mood and demeanor appropriate, frustrated about hospitalization      LABS/ CULTURES/ RADIOLOGY:              14.6   23.45 >-----------<  338      [01-10-22 @ 07:11]              41.9     127  |  84  |  27  ----------------------------<  163      [01-10-22 @ 07:11]  3.2   |  33  |  0.49        Ca     9.1     [01-10-22 @ 07:11]      Mg     2.20     [01-10-22 @ 07:11]      Phos  2.1     [01-10-22 @ 07:11]        CAPILLARY BLOOD GLUCOSE  POCT Blood Glucose.: 163 mg/dL (10 George 2022 08:56)  POCT Blood Glucose.: 168 mg/dL (09 Jan 2022 13:02)    A1C with Estimated Average Glucose Result: 5.7 % (12-24-21 @ 07:27)    Culture - Blood (collected 01-03-22 @ 19:03)  Source: .Blood Blood-Peripheral  Final Report (01-08-22 @ 23:01):    No Growth Final    Culture - Blood (collected 01-03-22 @ 19:01)  Source: .Blood Blood-Peripheral  Final Report (01-08-22 @ 23:01):    No Growth Final     [facility]-- WOUND TEAM -- FOLLOW UP NOTE  --------------------------------------------------------------------------------    Patient seen today at bedside on COVID isolation precautions.  Subjective: Patient requested patient care associate to draw blood. "I shouldn't have come here, my daughter brought me here."   Denies pain and/or tenderness to nasal bridge, unable to report etiology of new chest wounds.  Interval HPI/ 24 hour events:   HPI:  76 year old Female PMHx HTN, Asthma as a child (No hx of intubations/hospitalizations, Venous insufficiency, Lymphedema  Osteoarthritis presenting with fatigue, productive cough, and dyspnea. Patient report she was exposed to sick contact-  + COVID x 20 days ago. Patients  is currently admitted at [facility]. Patient states she did not have any symptoms however her sister who works as a nurse noted pt appeared dyspneic check Sp02 at home 66% She sent a rapid test today that came back + COVID. Notified PCP - Dr. Cherise Amos who recommended her to go to the hospital. Pt reports she took Zpak on 12/17 as a preventative measure and completed course. Endorses some decreased appetite and SOB now improved at rest on HFNC. Patient is unvaccinated, denies having COVID in the past. Has hx of chronic LE swelling is on PO lasix.  Denies hx of PE/DVT, fever, body aches, headache, vision change, chest pain, abdominal pain, nausea, vomiting, melena, hematochezia, dysuria, urinary frequency, calf pain.     In ED vitals: Temp 98F HR 75 /83 SP02 initially 70s on NC currently 95% on 15L NRB - escalated to HFNC  Labs- Ddimer - 532 Procalcitonin - 0.08 Ferritin 503  Patient is s/p PO tylenol and IV Decadron 6mg x1.  (23 Dec 2021 20:08)    Chart reviewed including labs and relevant images      Diet:  Diet, Regular:   DASH/TLC Sodium & Cholesterol Restricted (DASH) (01-06-22 @ 10:10)      ROS: General/ SKIN see subjective  Respiratory: denies SOB  all other systems negative      ALLERGIES & MEDICATIONS  --------------------------------------------------------------------------------  Allergies    Bactrim (Unknown)  Betadine (Unknown)  latex (Unknown)  sulfa drugs (Unknown)    Intolerances          STANDING INPATIENT MEDICATIONS    amLODIPine   Tablet 5 milliGRAM(s) Oral daily  aspirin enteric coated 81 milliGRAM(s) Oral daily  cefepime   IVPB      cefepime   IVPB 2000 milliGRAM(s) IV Intermittent every 12 hours  chlorhexidine 2% Cloths 1 Application(s) Topical daily  dextrose 5%. 1000 milliLiter(s) IV Continuous <Continuous>  enoxaparin Injectable 80 milliGRAM(s) SubCutaneous every 12 hours  furosemide    Tablet 40 milliGRAM(s) Oral daily  gabapentin 200 milliGRAM(s) Oral two times a day  methylPREDNISolone sodium succinate Injectable 40 milliGRAM(s) IV Push every 8 hours  potassium phosphate / sodium phosphate Powder (PHOS-NaK) 1 Packet(s) Oral two times a day before meals  senna 2 Tablet(s) Oral at bedtime  vancomycin  IVPB 750 milliGRAM(s) IV Intermittent every 12 hours      PRN INPATIENT MEDICATION  acetaminophen     Tablet .. 650 milliGRAM(s) Oral every 4 hours PRN  ALBUTerol    90 MICROgram(s) HFA Inhaler 2 Puff(s) Inhalation every 4 hours PRN  benzonatate 200 milliGRAM(s) Oral three times a day PRN  guaifenesin/dextromethorphan Oral Liquid 10 milliLiter(s) Oral every 4 hours PRN        Vital signs:  T(C): 36.3 (01-10-22 @ 05:00), Max: 36.4 (01-09-22 @ 13:45)  HR: 70 (01-10-22 @ 11:08) (55 - 85)  BP: 109/63 (01-10-22 @ 05:00) (109/63 - 115/72)  RR: 20 (01-10-22 @ 11:08) (16 - 20)  SpO2: 97% (01-10-22 @ 11:08) (96% - 100%)  BMI: 34.3 kg/m2          Physical exam:  General: NAD, A & O x 3,   HEENT: NC/NT, dry friable nasal and oral mucosa  Respiratory: supplemental oxygen via high flow nasal cannula  GI: Soft, NT/ND, fecal incontinence  : urinary incontinence, external female urinary collection device in place   Skin: moist, frail.  Nasal bridge unstagable pressure injury- 1.5cmx1.5cmx0.2cm, minimal moist eschar, stable. liquid barrier film applied.  Mid-upper chest- multiple category three skin tears- cluster [id number], pink-red friable dermis.  Psych: mood and demeanor appropriate, frustrated about hospitalization      LABS/ CULTURES/ RADIOLOGY:              14.6   23.45 >-----------<  338      [01-10-22 @ 07:11]              41.9     127  |  84  |  27  ----------------------------<  163      [01-10-22 @ 07:11]  3.2   |  33  |  0.49        Ca     9.1     [01-10-22 @ 07:11]      Mg     2.20     [01-10-22 @ 07:11]      Phos  2.1     [01-10-22 @ 07:11]    C-Reactive Protein, Serum: 111.1 mg/L (01.05.22 @ 07:19)       Historical Values  C-Reactive Protein, Serum: 18.1 mg/L (01.08.22 @ 08:34)   C-Reactive Protein, Serum: 111.1 mg/L (01.05.22 @ 07:19)   C-Reactive Protein, Serum: 41.4 mg/L (01.02.22 @ 07:06)   C-Reactive Protein, Serum: 83.3 mg/L (12.30.21 @ 10:55)   C-Reactive Protein, Serum: 70.7 mg/L (12.26.21 @ 06:53)   C-Reactive Protein, Serum: 139.8 mg/L (12.23.21 @ 17:56) Ferritin, Serum: 1157 ng/mL (01.05.22 @ 07:19)       Historical Values  Ferritin, Serum: 725 ng/mL (01.08.22 @ 08:34)   Ferritin, Serum: 1157 ng/mL (01.05.22 @ 07:19)   Ferritin, Serum: 488 ng/mL (01.02.22 @ 07:06)   Ferritin, Serum: 607 ng/mL (12.30.21 @ 10:55)   Ferritin, Serum: 416 ng/mL (12.26.21 @ 06:53)   Ferritin, Serum: 503 ng/mL (12.23.21 @ 17:56D-Dimer Assay, Quantitative (01.08.22 @ 08:34)   D-Dimer Assay, Quantitative: 246: A result less than 230 ng/mL DDU correlates with the absence of   thrombosis in a patient with low and moderate pre-test probability of   thrombosis.   Note: 1DDU is approximately equal to 2ng/mL FEU (previous unit).   If you have any questions, please contact Hematology Lab at ext. [phone]. ng/mL DDU       Historical Values  D-Dimer Assay, Quantitative (01.08.22 @ 08:34)   D-Dimer Assay, Quantitative: 246: A result less than 230 ng/mL DDU correlates with the absence of   thrombosis in a patient with low and moderate pre-test probability of   thrombosis.   Note: 1DDU is approximately equal to 2ng/mL FEU (previous unit). D-Dimer Assay, Quantitative (01.02.22 @ 07:06)   D-Dimer Assay, Quantitative: 626: A result less than 230 ng/mL DDU correlates with the absence of   thrombosis in a patient with low and moderate pre-test probability of thrombosis.   Note: 1DDU is approximately equal to 2ng/mL FEU (previous unit).    D-Dimer Assay, Quantitative (12.30.21 @ 10:55) D-Dimer Assay, Quantitative: 2186: A result less than 230 ng/mL DDU correlates with the absence of   thrombosis in a patient with low and moderate pre-test probability of   thrombosis.   Note: 1DDU is approximately equal to 2ng/mL FEU (previous unit).   If you have any questions, please contact Hematology Lab at ext. [phone]. ng/mL DDU   D-Dimer Assay, Quantitative (12.26.21 @ 06:53)   D-Dimer Assay, Quantitative: 2580: A result less than 230 ng/mL DDU correlates with the absence of   thrombosis in a patient with low and moderate pre-test probability of   thrombosis.   Note: 1DDU is approximately equal to 2ng/mL FEU (previous unit).   If you have any questions, please contact Hematology Lab at ext. [phone]. ng/mL DDU   D-Dimer Assay, Quantitative (12.23.21 @ 17:56)   D-Dimer Assay, Quantitative: 532: A result less than 230 ng/mL DDU correlates with the absence of   thrombosis in a patient with low and moderate pre-test probability of   thrombosis.   Note: 1DDU is approximately equal to 2ng/mL FEU (previous unit).   If you have any questions, please contact Hematology Lab at ext. [phone]. ng/mL DDU     CAPILLARY BLOOD GLUCOSE  POCT Blood Glucose.: 163 mg/dL (10 George 2022 08:56)  POCT Blood Glucose.: 168 mg/dL (09 Jan 2022 13:02)    A1C with Estimated Average Glucose Result: 5.7 % (12-24-21 @ 07:27)    Culture - Blood (collected 01-03-22 @ 19:03)  Source: .Blood Blood-Peripheral  Final Report (01-08-22 @ 23:01):    No Growth Final    Culture - Blood (collected 01-03-22 @ 19:01)  Source: .Blood Blood-Peripheral  Final Report (01-08-22 @ 23:01):    No Growth Final

## 2022-01-10 NOTE — PROVIDER CONTACT NOTE (OTHER) - ASSESSMENT
patient is A&O4 and anxious
patients left upper arm hard and ecchymotic, patient denies pain.
patient is A&O4. Patient is currently on AVAP, satting 96%. VSS. no fever.
patient is A&O4. Patient is currently on VPAP. patient is anxious
patient is A&O4. Patient is currently on VPAP. patient is anxious. patient states she does not want this medication today
patient is A&O4. Patient is currently on [place]flow. S
Pt alert in bed at baseline. Denies SOB, dizziness. Spo2 sustaining around 88, dipping to 86 at times.

## 2022-01-10 NOTE — PROVIDER CONTACT NOTE (OTHER) - DATE AND TIME:
24-Dec-2021 09:30
26-Dec-2021 11:45
10-George-2022 18:02
24-Dec-2021 18:00
03-Jan-2022 02:47
24-Dec-2021 18:00
26-Dec-2021 07:25

## 2022-01-10 NOTE — PROGRESS NOTE ADULT - SUBJECTIVE AND OBJECTIVE BOX
RENNY PERSAUD Female  Patient is a 76y old  Female who presents with a chief complaint of + Acute hypoxic respiratory failure 2/2 COVID pneumonia (10 George 2022 12:20)      INTERVAL HPI/OVERNIGHT EVENTS:  HPI:  76 year old Female PMHx HTN, Asthma as a child (No hx of intubations/hospitalizations, Venous insufficiency, Lymphedema  Osteoarthritis presenting with fatigue, productive cough, and dyspnea. Patient report she was exposed to sick contact-  + COVID x 20 days ago. Patients  was admitted at [facility]. Patient states she did not have any symptoms however her sister who works as a nurse noted pt appeared dyspneic check Sp02 at home 66% She sent a rapid test today that came back + COVID. Notified PCP - Dr. Cherise Amos who recommended her to go to the hospital. Pt reports she took Zpak on 12/17 as a preventative measure and completed course. Endorses some decreased appetite and SOB now improved at rest on HFNC. Patient is unvaccinated, denies having COVID in the past. Has hx of chronic LE swelling is on PO lasix.  Denies hx of PE/DVT, fever, body aches, headache, vision change, chest pain, abdominal pain, nausea, vomiting, melena, hematochezia, dysuria, urinary frequency, calf pain.     In ED vitals: Temp 98F HR 75 /83 SP02 initially 70s on NC currently 95% on 15L NRB - escalated to HFNC  Labs- Ddimer - 532 Procalcitonin - 0.08 Ferritin 503  Patient is s/p PO tylenol and IV Decadron 6mg x1.  (23 Dec 2021 20:08)      FAMILY HISTORY:  Family history of heart disease (Father)      Allergies    Bactrim (Unknown)  Betadine (Unknown)  latex (Unknown)  sulfa drugs (Unknown)    Intolerances      MEDICATIONS  (STANDING):  amLODIPine   Tablet 5 milliGRAM(s) Oral daily  aspirin enteric coated 81 milliGRAM(s) Oral daily  cefepime   IVPB      cefepime   IVPB 2000 milliGRAM(s) IV Intermittent every 12 hours  chlorhexidine 2% Cloths 1 Application(s) Topical daily  dextrose 5%. 1000 milliLiter(s) (50 mL/Hr) IV Continuous <Continuous>  enoxaparin Injectable 80 milliGRAM(s) SubCutaneous every 12 hours  furosemide    Tablet 40 milliGRAM(s) Oral daily  gabapentin 200 milliGRAM(s) Oral two times a day  methylPREDNISolone sodium succinate Injectable 40 milliGRAM(s) IV Push every 8 hours  potassium phosphate / sodium phosphate Powder (PHOS-NaK) 1 Packet(s) Oral two times a day before meals  senna 2 Tablet(s) Oral at bedtime  vancomycin  IVPB 750 milliGRAM(s) IV Intermittent every 12 hours    MEDICATIONS  (PRN):  acetaminophen     Tablet .. 650 milliGRAM(s) Oral every 4 hours PRN Temp greater or equal to 38C (100.4F), Mild Pain (1 - 3), Moderate Pain (4 - 6)  ALBUTerol    90 MICROgram(s) HFA Inhaler 2 Puff(s) Inhalation every 4 hours PRN Shortness of Breath and/or Wheezing  benzonatate 200 milliGRAM(s) Oral three times a day PRN Cough  guaifenesin/dextromethorphan Oral Liquid 10 milliLiter(s) Oral every 4 hours PRN Cough  sodium chloride 0.65% Nasal 1 Spray(s) Both Nostrils every 4 hours PRN High flow oxygen causing dry nose    REVIEW OF SYSTEMS:  CONSTITUTIONAL: No fever, weight loss, or fatigue  RESPIRATORY: No cough, wheezing, chills or hemoptysis; No shortness of breath  CARDIOVASCULAR: No chest pain, palpitations, dizziness, or leg swelling  GASTROINTESTINAL: No abdominal or epigastric pain. No nausea, vomiting, or hematemesis; No diarrhea or constipation. No melena or hematochezia.  NEUROLOGICAL: No headaches, memory loss, loss of strength, numbness, or tremors  MUSCULOSKELETAL: No joint pain or swelling; No muscle, back, or extremity pain  SKIN: No rashes or lesions    Vital Signs Last 24 Hrs  T(C): 36.4 (10 George 2022 17:46), Max: 36.4 (10 George 2022 17:46)  T(F): 97.6 (10 George 2022 17:46), Max: 97.6 (10 George 2022 17:46)  HR: 67 (10 George 2022 17:46) (55 - 70)  BP: 106/73 (10 George 2022 17:46) (106/73 - 109/63)  BP(mean): --  RR: 18 (10 George 2022 22:23) (18 - 20)  SpO2: 96% (10 George 2022 22:23) (96% - 100%)    T(C): 36.4 (01-10-22 @ 17:46), Max: 36.4 (01-10-22 @ 17:46)  HR: 67 (01-10-22 @ 17:46) (55 - 70)  BP: 106/73 (01-10-22 @ 17:46) (106/73 - 109/63)  RR: 18 (01-10-22 @ 22:23) (18 - 20)  SpO2: 96% (01-10-22 @ 22:23) (96% - 100%)    PHYSICAL EXAM:  GENERAL: NAD, well-groomed, well-developed  HEAD:  Atraumatic, Normocephalic  EYES: EOMI, PERRLA, conjunctiva and sclera clear  NECK: Supple, No JVD  NERVOUS SYSTEM:  Alert & Oriented X3, No gross focal deficits  CHEST/LUNG: Clear to percussion bilaterally; No rales, rhonchi, wheezing, or rubs  HEART: Regular rate and rhythm; S1S2 present  ABDOMEN: Soft, Nontender, Nondistended; Bowel sounds present  EXTREMITIES:  No clubbing, cyanosis, or edema  SKIN: No rashes or lesions

## 2022-01-10 NOTE — PROVIDER CONTACT NOTE (OTHER) - SITUATION
patient had destated to 78% SPO2 while eating. patient had removed her mask
patient refused lovenox
Pt spo2 levels dipping, respiratory notified, placed pt on nonrebreather.
patient's left upper arm is hardened and ecchymotic, patient states she has been getting her lovenox injections there.
pt desats and sustained 78-80% when switched to hiFlo
pt c/o heavy pain to chest (pain level 5/10)  does not radiate
patient had destated to 83% while eating. patient had removed her mask

## 2022-01-10 NOTE — PROVIDER CONTACT NOTE (OTHER) - ACTION/TREATMENT ORDERED:
patient advised to put the mask on. patient is back to stating at >92% on VPAP
If pt sustains around 85 contact respiratory.
provider aware. cardiac enzymes ordered to r/o PE.
patient advised on medication compliance
patient advised to put the mask on. patient is back to stating at >92% on highflow
provider aware.
provider notified.

## 2022-01-10 NOTE — PROVIDER CONTACT NOTE (OTHER) - RECOMMENDATIONS
provider notified
notify provider. switch back to AVAPS.
notify provider.
provider notified- called respiratory
notify provider. EKG done (reads NSR).
Contact ACP
provider notified.

## 2022-01-11 NOTE — CONSULT NOTE ADULT - SUBJECTIVE AND OBJECTIVE BOX
Wound SURGERY CONSULT NOTE    FROM:   FOR:   Reason for Consult:    HPI:  76 year old Female PMHx HTN, Asthma as a child (No hx of intubations/hospitalizations, Venous insufficiency, Lymphedema  Osteoarthritis presenting with fatigue, productive cough, and dyspnea. Patient report she was exposed to sick contact-  + COVID x 20 days ago. Patients  is currently admitted at [facility]. Patient states she did not have any symptoms however her sister who works as a nurse noted pt appeared dyspneic check Sp02 at home 66% She sent a rapid test today that came back + COVID. Notified PCP - Dr. Cherise Amos who recommended her to go to the hospital. Pt reports she took Zpak on 12/17 as a preventative measure and completed course. Endorses some decreased appetite and SOB now improved at rest on HFNC. Patient is unvaccinated, denies having COVID in the past. Has hx of chronic LE swelling is on PO lasix.  Denies hx of PE/DVT, fever, body aches, headache, vision change, chest pain, abdominal pain, nausea, vomiting, melena, hematochezia, dysuria, urinary frequency, calf pain.   Patient reports that she was un vaccinated    has since been D/[place] from [facility]  Had used BiPAP    In ED vitals: Temp 98F HR 75 /83 SP02 initially 70s on NC currently 95% on 15L NRB - escalated to HFNC  Labs- Ddimer - 532 Procalcitonin - 0.08 Ferritin 503  Patient is s/p PO tylenol and IV Decadron 6mg x1.  (23 Dec 2021 20:08)      PAST MEDICAL & SURGICAL HISTORY:  Hypertension    Osteoarthritis    History of hernia surgery    Status post hip surgery        REVIEW OF SYSTEMS      General: at bedrest, [facility] high flow O2    MEDICATIONS  (STANDING):  amLODIPine   Tablet 5 milliGRAM(s) Oral daily  aspirin enteric coated 81 milliGRAM(s) Oral daily  cefepime   IVPB      cefepime   IVPB 2000 milliGRAM(s) IV Intermittent every 12 hours  chlorhexidine 2% Cloths 1 Application(s) Topical daily  dextrose 5%. 1000 milliLiter(s) (50 mL/Hr) IV Continuous <Continuous>  enoxaparin Injectable 80 milliGRAM(s) SubCutaneous every 12 hours  furosemide    Tablet 40 milliGRAM(s) Oral daily  gabapentin 200 milliGRAM(s) Oral two times a day  methylPREDNISolone sodium succinate Injectable 40 milliGRAM(s) IV Push every 8 hours  senna 2 Tablet(s) Oral at bedtime  vancomycin  IVPB      vancomycin  IVPB 1000 milliGRAM(s) IV Intermittent every 12 hours    MEDICATIONS  (PRN):  acetaminophen     Tablet .. 650 milliGRAM(s) Oral every 4 hours PRN Temp greater or equal to 38C (100.4F), Mild Pain (1 - 3), Moderate Pain (4 - 6)  ALBUTerol    90 MICROgram(s) HFA Inhaler 2 Puff(s) Inhalation every 4 hours PRN Shortness of Breath and/or Wheezing  benzonatate 200 milliGRAM(s) Oral three times a day PRN Cough  guaifenesin/dextromethorphan Oral Liquid 10 milliLiter(s) Oral every 4 hours PRN Cough      Allergies    Bactrim (Unknown)  Betadine (Unknown)  latex (Unknown)  sulfa drugs (Unknown)    Intolerances        SOCIAL HISTORY:    FAMILY HISTORY:  Family history of heart disease (Father)        Vital Signs Last 24 Hrs  T(C): 36.9 (06 Jan 2022 05:17), Max: 37 (05 Jan 2022 21:20)  T(F): 98.4 (06 Jan 2022 05:17), Max: 98.6 (05 Jan 2022 21:20)  HR: 61 (06 Jan 2022 05:17) (52 - 68)  BP: 112/71 (06 Jan 2022 05:17) (112/71 - 124/63)  BP(mean): --  RR: 21 (06 Jan 2022 11:08) (18 - 22)  SpO2: 93% (06 Jan 2022 11:08) (93% - 100%)    PHYSICAL EXAM:      Constitutional: Complains about need for hospitalization  An adherent  eschar is present on bridge of nose without exposed bone or cartilage  No cellulitis present  Incontinent of urine  Respirations appear labored    LABS:                        13.1   16.40 )-----------( 458      ( 06 Jan 2022 11:43 )             40.6     01-05    140  |  96<L>  |  31<H>  ----------------------------<  160<H>  3.7   |  29  |  0.43<L>    Ca    10.2      05 Jan 2022 07:19  Phos  2.2     01-05  Mg     2.60     01-05    TPro  6.0  /  Alb  3.2<L>  /  TBili  0.6  /  DBili  <0.2  /  AST  20  /  ALT  21  /  AlkPhos  70  01-06          Albumin, Serum: 3.2 g/dL (01-06 @ 08:31)  Albumin, Serum: 3.2 g/dL (01-05 @ 07:19)  Albumin, Serum: 3.6 g/dL (01-04 @ 10:35)  Albumin, Serum: 3.3 g/dL (01-03 @ 06:58)  Albumin, Serum: 3.1 g/dL (01-02 @ 07:06)  Albumin, Serum: 3.2 g/dL (01-01 @ 07:37)  Albumin, Serum: 3.0 g/dL (12-31 @ 07:09)            
[facility]  [phone]  NEPHROLOGY      INITIAL CONSULT NOTE  --------------------------------------------------------------------------------  HPI: 76 year old Female PMHx HTN, Asthma as a child (No hx of intubations/hospitalizations, Venous insufficiency, Lymphedema, Osteoarthritis presenting with fatigue, productive cough, and dyspnea. Patient report she was exposed to sick contact-  + COVID x 20 days ago. Patients  is currently admitted at [facility]. Patient states she did not have any symptoms however her sister who works as a nurse noted pt appeared dyspneic check Sp02 at home 66% She sent a rapid test today that came back + COVID. Notified PCP - Dr. Cherise Amos who recommended her to go to the hospital. Pt reports she took Zpak on 12/17 as a preventative measure and completed course. Endorses some decreased appetite and SOB now improved at rest on HFNC. Patient is unvaccinated, denies having COVID in the past. Has hx of chronic LE swelling is on PO lasix.  Denies hx of PE/DVT, fever, body aches, headache, vision change, chest pain, abdominal pain, nausea, vomiting, melena, hematochezia, dysuria, urinary frequency, calf pain.     In ED vitals: Temp 98F HR 75 /83 SP02 initially 70s on NC currently 95% on 15L NRB - escalated to HFNC  Labs- Ddimer - 532 Procalcitonin - 0.08 Ferritin 503  Patient is s/p PO tylenol and IV Decadron 6mg x1.  (23 Dec 2021 20:08)    Hospital course: Patient admitted for acute hypoxic respiratory failure in the setting of COVID. S/p dexamethasone and remdesivir. Started on solu-medrol. Has been receiving oral lasix 40mg from 12/24 - 1/10. Recent downtrend in Na, 140 (1/5) to 123 (1/11). Nephrology consulted for hyponatremia.        PAST HISTORY  --------------------------------------------------------------------------------  PAST MEDICAL & SURGICAL HISTORY:  Hypertension    Osteoarthritis    History of hernia surgery    Status post hip surgery      FAMILY HISTORY:  Family history of heart disease (Father)      PAST SOCIAL HISTORY:    ALLERGIES & MEDICATIONS  --------------------------------------------------------------------------------  Allergies    Bactrim (Unknown)  Betadine (Unknown)  latex (Unknown)  sulfa drugs (Unknown)    Intolerances      Standing Inpatient Medications  amLODIPine   Tablet 5 milliGRAM(s) Oral daily  aspirin enteric coated 81 milliGRAM(s) Oral daily  cefepime   IVPB      cefepime   IVPB 2000 milliGRAM(s) IV Intermittent every 12 hours  chlorhexidine 2% Cloths 1 Application(s) Topical daily  enoxaparin Injectable 80 milliGRAM(s) SubCutaneous every 12 hours  gabapentin 200 milliGRAM(s) Oral two times a day  methylPREDNISolone sodium succinate Injectable 40 milliGRAM(s) IV Push every 12 hours  senna 2 Tablet(s) Oral at bedtime  vancomycin  IVPB 750 milliGRAM(s) IV Intermittent every 12 hours    PRN Inpatient Medications  acetaminophen     Tablet .. 650 milliGRAM(s) Oral every 4 hours PRN  ALBUTerol    90 MICROgram(s) HFA Inhaler 2 Puff(s) Inhalation every 4 hours PRN  benzonatate 200 milliGRAM(s) Oral three times a day PRN  guaifenesin/dextromethorphan Oral Liquid 10 milliLiter(s) Oral every 4 hours PRN  sodium chloride 0.65% Nasal 1 Spray(s) Both Nostrils every 4 hours PRN      REVIEW OF SYSTEMS  --------------------------------------------------------------------------------  Gen: Dizziness in the morning, No fevers/chills   Skin: No rashes  Head/Eyes/Ears/Mouth: No headache; Normal hearing;  No sinus pain/discomfort, sore throat  Respiratory: on HFNC No dyspnea, cough, wheezing, hemoptysis  CV: No chest pain, orthopnea  GI: No abdominal pain, diarrhea, nausea, vomiting, melena, hematochezia  : No dysuria, decrease urination or hesitancy urinating  hematuria, nocturia  MSK: No joint pain/swelling; no back pain  Neuro: No dizziness/lightheadedness, weakness, seizures, numbness, tingling  Heme: No easy bruising or bleeding  Endo: No heat/cold intolerance  Psych: No significant nervousness, anxiety or depression  also with no edema     All other systems were reviewed and are negative, except as noted.    VITALS/PHYSICAL EXAM  --------------------------------------------------------------------------------  T(C): 36.7 (01-11-22 @ 14:12), Max: 36.7 (01-11-22 @ 14:12)  HR: 70 (01-11-22 @ 14:44) (65 - 77)  BP: 104/72 (01-11-22 @ 14:12) (104/61 - 110/60)  RR: 20 (01-11-22 @ 14:44) (18 - 20)  SpO2: 98% (01-11-22 @ 14:44) (93% - 98%)  Wt(kg): --        01-10-22 @ 07:01  -  01-11-22 @ 07:00  --------------------------------------------------------  IN: 300 mL / OUT: 1000 mL / NET: -700 mL      Physical Exam:  	Gen: Non toxic comfortable appearing   	no jvd , supple neck,   	Pulm: Clear to ausculation B/L, normal bs, no rales or ronchi or wheezing  	CV: RRR, S1S2; no rub  	Back: No CVA tenderness; no sacral edema  	Abd: +BS, soft, nontender/nondistended  	: No suprapubic tenderness  	UE: Warm, no cyanosis  no clubbing,  no edema; no asterixis  	LE: Warm, no cyanosis  no clubbing, no edema  	Neuro: alert and oriented. speech coherent   	Psych: Normal affect and mood  	Skin: Warm, no decrease skin turgor   	Vascular access:    LABS/STUDIES  --------------------------------------------------------------------------------              12.1   31.85 >-----------<  325      [01-11-22 @ 07:32]              34.9     123  |  79  |  37  ----------------------------<  164      [01-11-22 @ 07:32]  3.5   |  31  |  0.62        Ca     9.1     [01-11-22 @ 07:32]      Mg     2.20     [01-11-22 @ 07:32]      Phos  2.9     [01-11-22 @ 07:32]                [01-11-22 @ 07:32]    Creatinine Trend:  SCr 0.62 [01-11 @ 07:32]  SCr 0.49 [01-10 @ 07:11]  SCr 0.47 [01-09 @ 08:07]  SCr 0.45 [01-08 @ 08:34]  SCr 0.47 [01-07 @ 07:04]    Urinalysis - [01-04-22 @ 03:59]      Color Yellow / Appearance Clear / SG 1.026 / pH 6.0      Gluc Negative / Ketone Small  / Bili Negative / Urobili <2 mg/dL       Blood Negative / Protein Trace / Leuk Est Negative / Nitrite Negative      RBC 11 / WBC 1 / Hyaline 0 / Gran  / Sq Epi  / Non Sq Epi 1 / Bacteria Negative      Ferritin 845      [01-11-22 @ 07:32]  TSH 2.84      [12-24-21 @ 07:27]  Lipid: chol 178, , HDL 39, LDL --      [12-24-21 @ 07:27]    HCV 0.08, Nonreact      [12-24-21 @ 09:53]

## 2022-01-11 NOTE — PROGRESS NOTE ADULT - ASSESSMENT
LABS:                        12.1   31.85 )-----------( 325      ( 11 Jan 2022 07:32 )             34.9   01-11    Na: 127->123->114->107    U Na: < 20  U.Osmolarity: 490    107<LL>  |  70<L>  |  44<H>  ----------------------------<  290<H>  4.8   |  16<L>  |  0.65    Ca    7.9<L>      11 Jan 2022 20:48  Phos  2.6     01-11  Mg     1.80     01-11      CBC Full  -  ( 11 Jan 2022 07:32 )  WBC Count : 31.85 K/uL  RBC Count : 4.05 M/uL  Hemoglobin : 12.1 g/dL  Hematocrit : 34.9 %  Platelet Count - Automated : 325 K/uL  Mean Cell Volume : 86.2 fL  Mean Cell Hemoglobin : 29.9 pg  Mean Cell Hemoglobin Concentration : 34.7 gm/dL  Auto Neutrophil # : 28.41 K/uL  Auto Lymphocyte # : 0.57 K/uL  Auto Monocyte # : 1.72 K/uL  Auto Eosinophil # : 0.00 K/uL  Auto Basophil # : 0.00 K/uL  Auto Neutrophil % : 88.3 %  Auto Lymphocyte % : 1.8 %  Auto Monocyte % : 5.4 %    HEALTH ISSUES - PROBLEM Dx:  Acute respiratory failure with hypoxia    Pneumonia due to COVID-19 virus    Hypertension    Lower extremity edema    Need for prophylactic measure    Hyponatremia:  Nephro Consult rewd.    Leukocytosis:  Plan: As per orders.    Problem/Plan - 1:  ·  Problem: Acute respiratory failure with hypoxia.   ·  Plan: HFlNC  FIO2: 100% -> 80%--> 60%  Flow: 55 L/min.--> 45 L/min-> 35 L/min.  O2 Sat. 96% - 98%     Problem/Plan - 2:  ·  Problem: Pneumonia due to COVID-19 virus. / COPD  methylPREDNISolone sodium succinate  cefepime & vanco.  S/P  DEXAMETHASONE  REMDESIVIR  On  LOVENOX  ALBUTEROL.     Problem/Plan - 3:  ·  Problem: Hypertension.   ·  Plan: AMLODIPINE  LASIX  KCL.     Problem/Plan - 4:  ·  Problem: Lower extremity edema.   ·  Plan: LASIX  KCL.     Problem/Plan - 5:  ·  Problem: Need for prophylactic measure.   ·  Plan: As per orders.

## 2022-01-11 NOTE — PROGRESS NOTE ADULT - SUBJECTIVE AND OBJECTIVE BOX
RENNY PERSAUD Female  Patient is a 76y old  Female who presents with a chief complaint of + Acute hypoxic respiratory failure 2/2 COVID pneumonia (11 Jan 2022 15:27)      INTERVAL HPI/OVERNIGHT EVENTS:  HPI:  76 year old Female PMHx HTN, Asthma as a child (No hx of intubations/hospitalizations, Venous insufficiency, Lymphedema  Osteoarthritis presenting with fatigue, productive cough, and dyspnea. Patient report she was exposed to sick contact-  + COVID x 20 days ago. Patients  is currently admitted at [facility]. Patient states she did not have any symptoms however her sister who works as a nurse noted pt appeared dyspneic check Sp02 at home 66% She sent a rapid test today that came back + COVID. Notified PCP - Dr. Cherise Amos who recommended her to go to the hospital. Pt reports she took Zpak on 12/17 as a preventative measure and completed course. Endorses some decreased appetite and SOB now improved at rest on HFNC. Patient is unvaccinated, denies having COVID in the past. Has hx of chronic LE swelling is on PO lasix.  Denies hx of PE/DVT, fever, body aches, headache, vision change, chest pain, abdominal pain, nausea, vomiting, melena, hematochezia, dysuria, urinary frequency, calf pain.     In ED vitals: Temp 98F HR 75 /83 SP02 initially 70s on NC currently 95% on 15L NRB - escalated to HFNC  Labs- Ddimer - 532 Procalcitonin - 0.08 Ferritin 503  Patient is s/p PO tylenol and IV Decadron 6mg x1.  (23 Dec 2021 20:08)      FAMILY HISTORY:  Family history of heart disease (Father)      Allergies    Bactrim (Unknown)  Betadine (Unknown)  latex (Unknown)  sulfa drugs (Unknown)    Intolerances      MEDICATIONS  (STANDING):  amLODIPine   Tablet 5 milliGRAM(s) Oral daily  aspirin enteric coated 81 milliGRAM(s) Oral daily  cefepime   IVPB 2000 milliGRAM(s) IV Intermittent every 12 hours  chlorhexidine 2% Cloths 1 Application(s) Topical daily  enoxaparin Injectable 80 milliGRAM(s) SubCutaneous every 12 hours  gabapentin 200 milliGRAM(s) Oral two times a day  methylPREDNISolone sodium succinate Injectable 40 milliGRAM(s) IV Push every 12 hours  senna 2 Tablet(s) Oral at bedtime  sodium chloride 1.5%. 500 milliLiter(s) (50 mL/Hr) IV Continuous <Continuous>  vancomycin  IVPB 750 milliGRAM(s) IV Intermittent once    MEDICATIONS  (PRN):  acetaminophen     Tablet .. 650 milliGRAM(s) Oral every 4 hours PRN Temp greater or equal to 38C (100.4F), Mild Pain (1 - 3), Moderate Pain (4 - 6)  ALBUTerol    90 MICROgram(s) HFA Inhaler 2 Puff(s) Inhalation every 4 hours PRN Shortness of Breath and/or Wheezing  benzonatate 200 milliGRAM(s) Oral three times a day PRN Cough  guaifenesin/dextromethorphan Oral Liquid 10 milliLiter(s) Oral every 4 hours PRN Cough  sodium chloride 0.65% Nasal 1 Spray(s) Both Nostrils every 4 hours PRN High flow oxygen causing dry nose    REVIEW OF SYSTEMS:  CONSTITUTIONAL: No fever, weight loss, or fatigue  RESPIRATORY: No cough, wheezing, chills or hemoptysis; No shortness of breath  CARDIOVASCULAR: No chest pain, palpitations, dizziness, or leg swelling  GASTROINTESTINAL: No abdominal or epigastric pain. No nausea, vomiting, or hematemesis; No diarrhea or constipation. No melena or hematochezia.  NEUROLOGICAL: No headaches, memory loss, loss of strength, numbness, or tremors  MUSCULOSKELETAL: No joint pain or swelling; No muscle, back, or extremity pain  SKIN: No rashes or lesions    Vital Signs Last 24 Hrs  T(C): 36.7 (11 Jan 2022 21:00), Max: 36.7 (11 Jan 2022 14:12)  T(F): 98.1 (11 Jan 2022 21:00), Max: 98.1 (11 Jan 2022 21:00)  HR: 72 (11 Jan 2022 21:00) (70 - 77)  BP: 94/62 (11 Jan 2022 21:00) (94/62 - 110/60)  BP(mean): --  RR: 20 (11 Jan 2022 22:16) (18 - 20)  SpO2: 98% (11 Jan 2022 22:16) (93% - 98%)    T(C): 36.7 (01-11-22 @ 21:00), Max: 36.7 (01-11-22 @ 14:12)  HR: 72 (01-11-22 @ 21:00) (70 - 77)  BP: 94/62 (01-11-22 @ 21:00) (94/62 - 110/60)  RR: 20 (01-11-22 @ 22:16) (18 - 20)  SpO2: 98% (01-11-22 @ 22:16) (93% - 98%)  I&O's Summary    10 George 2022 07:01  -  11 Jan 2022 07:00  --------------------------------------------------------  IN: 300 mL / OUT: 1000 mL / NET: -700 mL    11 Jan 2022 07:01  -  11 Jan 2022 22:54  --------------------------------------------------------  IN: 711 mL / OUT: 600 mL / NET: 111 mL   Auto Eosinophil % : 0.0 %  Auto Basophil % : 0.0 %      PHYSICAL EXAM:  GENERAL: NAD, well-groomed, well-developed  HEAD:  Atraumatic, Normocephalic  EYES: EOMI, PERRLA, conjunctiva and sclera clear  NECK: Supple, No JVD  NERVOUS SYSTEM:  Alert & Oriented X3, No gross focal deficits  CHEST/LUNG: Clear to percussion bilaterally; No rales, rhonchi, wheezing, or rubs  HEART: Regular rate and rhythm; S1S2 present  ABDOMEN: Soft, Nontender, Nondistended; Bowel sounds present  EXTREMITIES:  No clubbing, cyanosis, or edema  SKIN: No rashes or lesions

## 2022-01-11 NOTE — CONSULT NOTE ADULT - ATTENDING COMMENTS
hypovolemic hyponatremia evident by hypotension and long term hypotonic fluid administration  DC lasix   check urine and serum osm.   once confirmed to have hypo-osmolar then can start HTS as outlined above

## 2022-01-11 NOTE — CHART NOTE - NSCHARTNOTEFT_GEN_A_CORE
Spoke to Nephro @ [phone] re Na of 114, serum osmolality 260, urine osmolality 490. Rpt BMP still pending.   Advised to give hypertonic NS 1.5% at 50cc/hr for 6h. Also, to change formulation of antibiotics (Vancomycin & Cefepime) from dextrose to NS.    Plan:  -f/u rpt BMP in AM  -continue to monitor

## 2022-01-11 NOTE — CHART NOTE - NSCHARTNOTEFT_GEN_A_CORE
Pt with increasing hyponatremia. Pt assessed and with no reported symptoms. Urine Na, Urine osm and Serum osm ordered. IV fluids stopped. Nephrology consulted.

## 2022-01-11 NOTE — CONSULT NOTE ADULT - ASSESSMENT
76 year old Female PMHx HTN, Asthma as a child (No hx of intubations/hospitalizations, Venous insufficiency, Lymphedema  Osteoarthritis presenting with fatigue, productive cough, and dyspnea found to have acute hypoxic respiratory failure in the setting of COVID PNA. Course complicated by hyponatremia ISO lasix/?possible D5 and nephrology was consulted.    Plan:  #Hyponatremia likely hypovolemic hyponatremia  - Na has been downtrending from late December. 140 on 1/5 and down to 123 on 1/11  - Patient has been getting oral lasix 40mg since 12/24, recently discontinued on 1/10. On PE, appears to be euvolemic or even hypovolemic  - D5 was ordered on on 12/27 and was scheduled for 20 hours at 50cc/hr. Officially d/c'd on 1/11, and has been off fluids per staff so less likely hypervolemic  - Would hold lasix and check serum and urine osmols STAT. Send urine lytes  - If serum osmol <280, would start on hypertonic fluids 1.5% at 50cc/hr for 6 hours  - Continue to monitor BMP, check q12    FINAL RECS TO BE DISCUSSED WITH ATTENDING 76 year old Female PMHx HTN, Asthma as a child (No hx of intubations/hospitalizations, Venous insufficiency, Lymphedema  Osteoarthritis presenting with fatigue, productive cough, and dyspnea found to have acute hypoxic respiratory failure in the setting of COVID PNA. Course complicated by hyponatremia ISO lasix/?possible D5 and nephrology was consulted.    Plan:  #Hyponatremia likely hypovolemic hyponatremia  - Na has been downtrending from late December. 140 on 1/5 and down to 123 on 1/11  - Patient has been getting oral lasix 40mg since 12/24, recently discontinued on 1/10. On PE, appears to be euvolemic or even hypovolemic  - D5 was ordered on on 12/27 and was scheduled for 20 hours at 50cc/hr. Officially d/c'd on 1/11, and has been off fluids per staff so less likely hypervolemic  - Would hold lasix and check serum and urine osmols STAT. Send urine lytes  - If serum osmol <280, would start on hypertonic fluids 1.5% at 50cc/hr for 6 hours  - Continue to monitor BMP, check q12  - Do not correct Na by >6-8 over 24 hours    FINAL RECS TO BE DISCUSSED WITH ATTENDING

## 2022-01-11 NOTE — CONSULT NOTE ADULT - REASON FOR ADMISSION
+ Acute hypoxic respiratory failure 2/2 COVID pneumonia
+ Acute hypoxic respiratory failure 2/2 COVID pneumonia

## 2022-01-12 NOTE — PROGRESS NOTE ADULT - SUBJECTIVE AND OBJECTIVE BOX
[facility] DIVISION OF KIDNEY DISEASES AND HYPERTENSION -- FOLLOW UP NOTE  --------------------------------------------------------------------------------  Chief Complaint: SOB, acute hypoxic resp failure     24 hour events/subjective: Overnight, patient's sodium continues to downtrend. 123 to 114 (corrected sodium 116-117) to corrected sodium of 111. She was transitioned off D5 that was being received with antibiotics to NS. Patient was asymptomatic at that time. Patient seen at bedside this morning. She denies HA, fever, CP, SOB, ab pain, issues with urination.         PAST HISTORY  --------------------------------------------------------------------------------  No significant changes to PMH, PSH, FHx, SHx, unless otherwise noted    ALLERGIES & MEDICATIONS  --------------------------------------------------------------------------------  Allergies    Bactrim (Unknown)  Betadine (Unknown)  latex (Unknown)  sulfa drugs (Unknown)    Intolerances      Standing Inpatient Medications  amLODIPine   Tablet 5 milliGRAM(s) Oral daily  aspirin enteric coated 81 milliGRAM(s) Oral daily  cefepime   IVPB 2000 milliGRAM(s) IV Intermittent every 12 hours  chlorhexidine 2% Cloths 1 Application(s) Topical daily  enoxaparin Injectable 80 milliGRAM(s) SubCutaneous every 12 hours  gabapentin 200 milliGRAM(s) Oral two times a day  methylPREDNISolone sodium succinate Injectable 40 milliGRAM(s) IV Push every 12 hours  senna 2 Tablet(s) Oral at bedtime  sodium chloride 1.5%. 500 milliLiter(s) IV Continuous <Continuous>    PRN Inpatient Medications  acetaminophen     Tablet .. 650 milliGRAM(s) Oral every 4 hours PRN  ALBUTerol    90 MICROgram(s) HFA Inhaler 2 Puff(s) Inhalation every 4 hours PRN  benzonatate 200 milliGRAM(s) Oral three times a day PRN  guaifenesin/dextromethorphan Oral Liquid 10 milliLiter(s) Oral every 4 hours PRN  sodium chloride 0.65% Nasal 1 Spray(s) Both Nostrils every 4 hours PRN      REVIEW OF SYSTEMS  --------------------------------------------------------------------------------  Gen: No weight changes, fatigue, fevers/chills, weakness  Skin: No rashes  Head/Eyes/Ears/Mouth: No headache; Normal hearing; Normal vision w/o blurriness; No sinus pain/discomfort, sore throat  Respiratory: No dyspnea, cough, wheezing, hemoptysis  CV: No chest pain, PND, orthopnea  GI: No abdominal pain, diarrhea, constipation, nausea, vomiting, melena, hematochezia  : No increased frequency, dysuria, hematuria, nocturia  MSK: No joint pain/swelling; no back pain; no edema  Neuro: No dizziness/lightheadedness, weakness, seizures, numbness, tingling  Heme: No easy bruising or bleeding  Endo: No heat/cold intolerance  Psych: No significant nervousness, anxiety, stress, depression    All other systems were reviewed and are negative, except as noted.    VITALS/PHYSICAL EXAM  --------------------------------------------------------------------------------  T(C): 36.8 (01-12-22 @ 05:50), Max: 36.8 (01-12-22 @ 02:15)  HR: 85 (01-12-22 @ 07:48) (70 - 88)  BP: 106/52 (01-12-22 @ 05:50) (94/62 - 109/56)  RR: 20 (01-12-22 @ 07:48) (18 - 20)  SpO2: 97% (01-12-22 @ 07:48) (93% - 100%)  Wt(kg): --        01-11-22 @ 07:01  -  01-12-22 @ 07:00  --------------------------------------------------------  IN: 711 mL / OUT: 600 mL / NET: 111 mL      Physical Exam:  	Gen: NAD, well-appearing  	HEENT: PERRL, supple neck, clear oropharynx  	Pulm: CTA B/L  	CV: RRR, S1S2; no rub  	Back: No spinal or CVA tenderness; no sacral edema  	Abd: +BS, soft, nontender/nondistended  	: No suprapubic tenderness  	UE: Warm, FROM, no clubbing, intact strength; no edema; no asterixis  	LE: Warm, FROM, no clubbing, intact strength; no edema  	Neuro: No focal deficits, intact gait  	Psych: Normal affect and mood  	Skin: Warm, without rashes  	Vascular access:    LABS/STUDIES  --------------------------------------------------------------------------------              12.1   31.85 >-----------<  325      [01-11-22 @ 07:32]              34.9     107  |  70  |  44  ----------------------------<  290      [01-11-22 @ 20:48]  4.8   |  16  |  0.65        Ca     7.9     [01-11-22 @ 20:48]      Mg     1.80     [01-11-22 @ 20:48]      Phos  2.6     [01-11-22 @ 20:48]                [01-11-22 @ 07:32]  Serum Osmolality 260      [01-11-22 @ 19:04]    Creatinine Trend:  SCr 0.65 [01-11 @ 20:48]  SCr 0.71 [01-11 @ 19:04]  SCr 0.62 [01-11 @ 07:32]  SCr 0.49 [01-10 @ 07:11]  SCr 0.47 [01-09 @ 08:07]    Urinalysis - [01-04-22 @ 03:59]      Color Yellow / Appearance Clear / SG 1.026 / pH 6.0      Gluc Negative / Ketone Small  / Bili Negative / Urobili <2 mg/dL       Blood Negative / Protein Trace / Leuk Est Negative / Nitrite Negative      RBC 11 / WBC 1 / Hyaline 0 / Gran  / Sq Epi  / Non Sq Epi 1 / Bacteria Negative    Urine Sodium <20      [01-11-22 @ 19:23]  Urine Osmolality 490      [01-11-22 @ 19:23]    Ferritin 845      [01-11-22 @ 07:32]  TSH 2.84      [12-24-21 @ 07:27]  Lipid: chol 178, , HDL 39, LDL --      [12-24-21 @ 07:27]    HCV 0.08, Nonreact      [12-24-21 @ 09:53]

## 2022-01-12 NOTE — PROGRESS NOTE ADULT - ASSESSMENT
LABS:                        7.6    56.72 )-----------( 308      ( 12 Jan 2022 15:17 )             20.7   01-12    112<LL>  |  74<L>  |  56<H>  ----------------------------<  259<H>  3.8   |  17<L>  |  1.16    Ca    8.1<L>      12 Jan 2022 15:17  Phos  4.1     01-12  Mg     1.90     01-12    CBC Full  -  ( 12 Jan 2022 15:17 )  WBC Count : 56.72 K/uL  RBC Count : 2.49 M/uL  Hemoglobin : 7.6 g/dL  Hematocrit : 20.7 %  Platelet Count - Automated : 308 K/uL  Mean Cell Volume : 83.1 fL  Mean Cell Hemoglobin : 30.5 pg  Mean Cell Hemoglobin Concentration : 36.7 gm/dL  Auto Neutrophil # : 50.25 K/uL  Auto Lymphocyte # : 3.97 K/uL  Auto Monocyte # : 1.99 K/uL  Auto Eosinophil # : 0.00 K/uL  Auto Basophil # : 0.00 K/uL  Auto Neutrophil % : 86.8 %  Auto Lymphocyte % : 7.0 %    Auto Monocyte % : 3.5 %  Auto Eosinophil % : 0.0 %  Auto Basophil % : 0.0 %        CAPILLARY BLOOD GLUCOSE      POCT Blood Glucose.: 211 mg/dL (12 Jan 2022 00:39)      HEALTH ISSUES - PROBLEM Dx:  Acute respiratory failure with hypoxia    Pneumonia due to COVID-19 virus    Hypertension    Lower extremity edema    Need for prophylactic measure    Hyponatremia    Leukocytosis    Problem/Plan - 1:  ·  Problem: Acute respiratory failure with hypoxia.   ·  Plan: HFlNC  FIO2: 100% -> 80%--> 60%->70%  Flow: 55 L/min.--> 45 L/min-> 35 L/min.  O2 Sat. 96% - 99%     Problem/Plan - 2:  ·  Problem: Pneumonia due to COVID-19 virus. / COPD  methylPREDNISolone sodium succinate  cefepime & vanco.  S/P  DEXAMETHASONE  REMDESIVIR  On  LOVENOX  ALBUTEROL.     Problem/Plan - 3:  ·  Problem: Hypertension.   ·  Plan: AMLODIPINE  LASIX  KCL.     Problem/Plan - 4:  ·  Problem: Lower extremity edema.   ·  Plan: LASIX  KCL.     Problem/Plan - 5:  ·  Problem: Need for prophylactic measure.   ·  Plan: As per orders.

## 2022-01-12 NOTE — PROGRESS NOTE ADULT - ASSESSMENT
76 year old Female PMHx HTN, Asthma as a child (No hx of intubations/hospitalizations, Venous insufficiency, Lymphedema  Osteoarthritis presenting with fatigue, productive cough, and dyspnea found to have acute hypoxic respiratory failure in the setting of COVID PNA. Course complicated by hyponatremia ISO lasix/?possible D5 and nephrology was consulted.    Plan:  #Hyponatremia likely hypovolemic hyponatremia  - Na has been downtrending from late December. 140 on 1/5 and down to 123 on 1/11  - Patient has been getting oral lasix 40mg since 12/24, recently discontinued on 1/10. On PE, appears to be euvolemic or even hypovolemic  - D5 was ordered on on 12/27 and was scheduled for 20 hours at 50cc/hr. Officially d/c'd on 1/11, and has been off fluids per staff so less likely hypervolemic  - Recommend switching IV antibiotics in D5 to NS  - Lasix held. Urine osmol 490 and serum osmol 260, urine Na <20 consistent with hypovolemic hyponatremia  - Started on hypertonic fluids 1.5% at 50cc/hr for 6 hours, awaiting repeat BMP  - Continue to monitor BMP, check q12  - Do not correct Na by >6-8 over first 24 hours  - repeat urine osmol and Na ordered STAT    FINAL RECS TO BE DISCUSSED WITH ATTENDING 76 year old Female PMHx HTN, Asthma as a child (No hx of intubations/hospitalizations, Venous insufficiency, Lymphedema  Osteoarthritis presenting with fatigue, productive cough, and dyspnea found to have acute hypoxic respiratory failure in the setting of COVID PNA. Course complicated by hyponatremia ISO lasix/?possible D5 and nephrology was consulted.    Plan:  #Hyponatremia likely hypovolemic hyponatremia  - Na has been downtrending from late December. 140 on 1/5 and down to 123 on 1/11  - Patient has been getting oral lasix 40mg since 12/24, recently discontinued on 1/10. On PE, appears to be euvolemic or even hypovolemic  - D5 was ordered on on 12/27 and was scheduled for 20 hours at 50cc/hr. Officially d/c'd on 1/11, and has been off fluids per staff so less likely hypervolemic  - Recommend switching IV antibiotics in D5 to NS  - Lasix held. Urine osmol 490 and serum osmol 260, urine Na <20 consistent with hypovolemic hyponatremia  - Started on hypertonic fluids 1.5% at 50cc/hr for 6 hours, awaiting repeat BMP  - Continue to monitor BMP, check q12  - Do not correct Na by >6-8 over first 24 hours  - repeat urine osmol and Na ordered STAT  - start on midodrine 5mg TID    FINAL RECS TO BE DISCUSSED WITH ATTENDING

## 2022-01-12 NOTE — PROGRESS NOTE ADULT - SUBJECTIVE AND OBJECTIVE BOX
RENNY PERSAUD Female  Patient is a 76y old  Female who presents with a chief complaint of + Acute hypoxic respiratory failure 2/2 COVID pneumonia (12 Jan 2022 08:53)      INTERVAL HPI/OVERNIGHT EVENTS:  HPI:  76 year old Female PMHx HTN, Asthma as a child (No hx of intubations/hospitalizations, Venous insufficiency, Lymphedema  Osteoarthritis presenting with fatigue, productive cough, and dyspnea. Patient report she was exposed to sick contact-  + COVID x 20 days ago. Patients  is currently admitted at [facility]. Patient states she did not have any symptoms however her sister who works as a nurse noted pt appeared dyspneic check Sp02 at home 66% She sent a rapid test today that came back + COVID. Notified PCP - Dr. Cherise Amos who recommended her to go to the hospital. Pt reports she took Zpak on 12/17 as a preventative measure and completed course. Endorses some decreased appetite and SOB now improved at rest on HFNC. Patient is unvaccinated, denies having COVID in the past. Has hx of chronic LE swelling is on PO lasix.  Denies hx of PE/DVT, fever, body aches, headache, vision change, chest pain, abdominal pain, nausea, vomiting, melena, hematochezia, dysuria, urinary frequency, calf pain.     In ED vitals: Temp 98F HR 75 /83 SP02 initially 70s on NC currently 95% on 15L NRB - escalated to HFNC  Labs- Ddimer - 532 Procalcitonin - 0.08 Ferritin 503  Patient is s/p PO tylenol and IV Decadron 6mg x1.  (23 Dec 2021 20:08)      FAMILY HISTORY:  Family history of heart disease (Father)      Allergies    Bactrim (Unknown)  Betadine (Unknown)  latex (Unknown)  sulfa drugs (Unknown)    Intolerances      MEDICATIONS  (STANDING):  aspirin enteric coated 81 milliGRAM(s) Oral daily  cefepime   IVPB 2000 milliGRAM(s) IV Intermittent every 12 hours  chlorhexidine 2% Cloths 1 Application(s) Topical daily  enoxaparin Injectable 80 milliGRAM(s) SubCutaneous every 12 hours  gabapentin 200 milliGRAM(s) Oral two times a day  methylPREDNISolone sodium succinate Injectable 40 milliGRAM(s) IV Push every 12 hours  midodrine. 5 milliGRAM(s) Oral three times a day  senna 2 Tablet(s) Oral at bedtime  sodium chloride 3%. 500 milliLiter(s) (30 mL/Hr) IV Continuous <Continuous>    MEDICATIONS  (PRN):  acetaminophen     Tablet .. 650 milliGRAM(s) Oral every 4 hours PRN Temp greater or equal to 38C (100.4F), Mild Pain (1 - 3), Moderate Pain (4 - 6)  ALBUTerol    90 MICROgram(s) HFA Inhaler 2 Puff(s) Inhalation every 4 hours PRN Shortness of Breath and/or Wheezing  benzonatate 200 milliGRAM(s) Oral three times a day PRN Cough  guaifenesin/dextromethorphan Oral Liquid 10 milliLiter(s) Oral every 4 hours PRN Cough  sodium chloride 0.65% Nasal 1 Spray(s) Both Nostrils every 4 hours PRN High flow oxygen causing dry nose    REVIEW OF SYSTEMS:  CONSTITUTIONAL: No fever, weight loss, or fatigue  RESPIRATORY: No cough, wheezing, chills or hemoptysis; No shortness of breath  CARDIOVASCULAR: No chest pain, palpitations, dizziness, or leg swelling  GASTROINTESTINAL: No abdominal or epigastric pain. No nausea, vomiting, or hematemesis; No diarrhea or constipation. No melena or hematochezia.  NEUROLOGICAL: No headaches, memory loss, loss of strength, numbness, or tremors  MUSCULOSKELETAL: No joint pain or swelling; No muscle, back, or extremity pain  SKIN: No rashes or lesions    Vital Signs Last 24 Hrs  T(C): 36.9 (12 Jan 2022 13:01), Max: 36.9 (12 Jan 2022 13:01)  T(F): 98.5 (12 Jan 2022 13:01), Max: 98.5 (12 Jan 2022 13:01)  HR: 86 (12 Jan 2022 20:05) (71 - 88)  BP: 100/59 (12 Jan 2022 13:01) (100/59 - 109/56)  BP(mean): --  RR: 20 (12 Jan 2022 20:05) (19 - 20)  SpO2: 99% (12 Jan 2022 20:05) (96% - 100%)    T(C): 36.9 (01-12-22 @ 13:01), Max: 36.9 (01-12-22 @ 13:01)  HR: 86 (01-12-22 @ 20:05) (71 - 88)  BP: 100/59 (01-12-22 @ 13:01) (100/59 - 109/56)  RR: 20 (01-12-22 @ 20:05) (19 - 20)  SpO2: 99% (01-12-22 @ 20:05) (96% - 100%)  I&O's Summary    11 Jan 2022 07:01  -  12 Jan 2022 07:00  --------------------------------------------------------  IN: 711 mL / OUT: 600 mL / NET: 111 mL    PHYSICAL EXAM:  GENERAL: NAD, well-groomed, well-developed  HEAD:  Atraumatic, Normocephalic  EYES: EOMI, PERRLA, conjunctiva and sclera clear  NECK: Supple, No JVD  NERVOUS SYSTEM:  Alert & Oriented X3, No gross focal deficits  CHEST/LUNG: Clear to percussion bilaterally; No rales, rhonchi, wheezing, or rubs  HEART: Regular rate and rhythm; S1S2 present  ABDOMEN: Soft, Nontender, Nondistended; Bowel sounds present  EXTREMITIES:  No clubbing, cyanosis, or edema  SKIN: No rashes or lesions

## 2022-01-12 NOTE — CHART NOTE - NSCHARTNOTEFT_GEN_A_CORE
Na resulted following 1.5% NS at 50cc/hr for 6 hours, corrected sodium from 107 (w/ glucose 290) to 112 (w/ glucose 259). Recommend starting 3% NS at 30cc/hr for 4 hours. Repeat labs after infusion; BMP at 10PM. Please call the on-call nephrologist with results. Na resulted following 1.5% NS at 50cc/hr for 6 hours, corrected sodium from 107 (w/ glucose 290) to 112 (w/ glucose 259). Recommend starting 3% NS at 30cc/hr for 4 hours. Repeat labs after infusion; BMP at 10PM. Please call the on-call nephrology fellow with results.

## 2022-01-13 NOTE — PROGRESS NOTE ADULT - REASON FOR ADMISSION
+ Acute hypoxic respiratory failure 2/2 COVID pneumonia

## 2022-01-13 NOTE — PROVIDER CONTACT NOTE (CRITICAL VALUE NOTIFICATION) - ACTION/TREATMENT ORDERED:
Repeat BMP in AM
Provider notified. Provider re-ordered MRSA swab. Swab sent to lab.
waiting for further orders
Order fluids

## 2022-01-13 NOTE — DISCHARGE NOTE FOR THE EXPIRED PATIENT - HOSPITAL COURSE
76 F PMHx HTN, Asthma as a child (No hx of intubations/hospitalizations, Venous insufficiency, Lymphedema OA p/w acute respiratory failure w/ hypoxemia 2/2 COVID, requiring HFNC and AVAP. Pt was on full dose anticoagulation with Lovenox and being treated with Cefepime & Vanco, and IV solumedrol for sepsis. Pt developed Hyponatremia started on IVF with Nephrology following. This morning pt was found to be unresponsive so RRT was called, and lost pulse so CPR was initiated. Pt gained pulse was intubated and placed on pressors without improvement. Pt  at 11:48am with family at bedside.    + COVID s/p Rem/Decadron - currently tolerating hi flow    + D-Dimer elevated: Lovenox SQ 80 BID  Hyponatremia likely hypovolemic hyponatremia   76 F PMHx HTN, Asthma as a child (No hx of intubations/hospitalizations, Venous insufficiency, Lymphedema OA p/w acute respiratory failure w/ hypoxemia 2/2 COVID, requiring HFNC and AVAP. Pt received course of Dexamethasone & Demdesivir and was on full dose anticoagulation with Lovenox and also being treated with Cefepime & Vanco, and IV solumedrol for sepsis. Pt developed Hyponatremia started on IVF with Nephrology following. This morning pt was found by RN to be unresponsive so RRT was called, and lost pulse so CPR was initiated. Pt gained pulse back, was intubated and placed on pressors without improvement. Pt  at 11:48am with family at bedside.    + COVID s/p Rem/Decadron - currently tolerating hi flow    + D-Dimer elevated: Lovenox SQ 80 BID  Hyponatremia likely hypovolemic hyponatremia

## 2022-01-13 NOTE — PROVIDER CONTACT NOTE (CRITICAL VALUE NOTIFICATION) - BACKGROUND
Critical lab- Sodium 107
patient admit with COVID
Critical lab value- Sodium 114
Pt with acute hypoxic respiratory failure and COVID pneumonia

## 2022-01-13 NOTE — PROGRESS NOTE ADULT - ASSESSMENT
76 year old Female PMHx HTN, Asthma as a child (No hx of intubations/hospitalizations, Venous insufficiency, Lymphedema  Osteoarthritis presenting with fatigue, productive cough, and dyspnea found to have acute hypoxic respiratory failure in the setting of COVID PNA. Course complicated by hyponatremia ISO lasix/?possible D5 and nephrology was consulted.    Plan:  #Hyponatremia likely hypovolemic hyponatremia  - Na has been downtrending from late December. 140 on 1/5 and down to 123 on 1/11  - Patient has been getting oral lasix 40mg since 12/24, recently discontinued on 1/10. On PE, appears to be euvolemic or even hypovolemic  - D5 was ordered on on 12/27 and was scheduled for 20 hours at 50cc/hr. Officially d/c'd on 1/11, and has been off fluids per staff so less likely hypervolemic  - Recommend switching IV antibiotics in D5 to NS  - Lasix held. Urine osmol 490 and serum osmol 260, urine Na <20 consistent with hypovolemic hyponatremia  - Continue to monitor BMP, check q12  - Do not correct Na by >6-8 over first 24 hours  - repeat urine osmol and Na ordered STAT  - start on midodrine 5mg TID    #HARSH  - check urine lytes, FeNA (has been off lasix)  - likely prerenal azotemia  - BUN/Cr ratio >20. Would recommend IVF    FINAL RECS TO BE DISCUSSED WITH ATTENDING

## 2022-01-13 NOTE — PROGRESS NOTE ADULT - ATTENDING COMMENTS
hypo-osmolar hypovolemic hyponatremia evident by hypotension   also Long term hypotonic fluid administration  DC lasix   Dc amlodipine as BP low 100 and start Midodrine 5mg TID  check urine and serum osm daily   s/p 1.5%  HTS  last night.  further recommendation  once Na level is back from this morning
s/p Code. actively dying  family at bedside
76 year old Female  with nasal and chest wall pressure injuries/abrasions   PMHx HTN, Asthma   Venous insufficiency, Lymphedema  Osteoarthritis)  + COVID.    hx of chronic LE swelling is on PO lasix.    now acute hypoxic respiratory failure 2/2 COVID pneumonia. s/p BiPAP, now on high flow nasal cannula.    unstagable pressure injury to nasal bridge and mid-anterior chest with a cluster of category three skin tears.   ddimer, bnp and ferritin past peak, covid with very high wbc- on steroids cefapimine and vanco consider urine culture/ repeat cxr     Unstagable pressure injury to nasal bridge:-Cleanse with NS, pat dry.-Liquid barrier film daily, leave DUNG.  Mid-anterior chest category 3 skin tears:-Cleanse with NS, pat dry. Apply Liquid barrier film to periwound skin. Apply silicone foam with border. Change every other day.    Friable/dry nasal and oral mucosa:  -Provide oral care per protocol, apply sween 24 to base of nares and lips every shift and prn.     Risk for incontinence associated dermatitis: lexus  consider probiotic while on antibitoics  ppi for stress on steroids  heparin sub cu dvt prophylaxisi/ scd compression, no distal ischemia noted  -

## 2022-01-13 NOTE — PROGRESS NOTE ADULT - SUBJECTIVE AND OBJECTIVE BOX
[facility] DIVISION OF KIDNEY DISEASES AND HYPERTENSION -- FOLLOW UP NOTE  --------------------------------------------------------------------------------  Chief Complaint: SOB, acute hypoxic resp failure    24 hour events/subjective: No overnight events. Na continues to slowly improve with fluids. Patient seen at bedside this morning. She appears fatigued with increase work of breathing. She states that she is a little short of breath.         PAST HISTORY  --------------------------------------------------------------------------------  No significant changes to PMH, PSH, FHx, SHx, unless otherwise noted    ALLERGIES & MEDICATIONS  --------------------------------------------------------------------------------  Allergies    Bactrim (Unknown)  Betadine (Unknown)  latex (Unknown)  sulfa drugs (Unknown)    Intolerances      Standing Inpatient Medications  aspirin enteric coated 81 milliGRAM(s) Oral daily  cefepime   IVPB 2000 milliGRAM(s) IV Intermittent every 12 hours  chlorhexidine 2% Cloths 1 Application(s) Topical daily  enoxaparin Injectable 80 milliGRAM(s) SubCutaneous every 12 hours  gabapentin 200 milliGRAM(s) Oral two times a day  methylPREDNISolone sodium succinate Injectable 40 milliGRAM(s) IV Push every 12 hours  midodrine. 5 milliGRAM(s) Oral three times a day  senna 2 Tablet(s) Oral at bedtime  sodium chloride 3%. 500 milliLiter(s) IV Continuous <Continuous>    PRN Inpatient Medications  acetaminophen     Tablet .. 650 milliGRAM(s) Oral every 4 hours PRN  ALBUTerol    90 MICROgram(s) HFA Inhaler 2 Puff(s) Inhalation every 4 hours PRN  benzonatate 200 milliGRAM(s) Oral three times a day PRN  guaifenesin/dextromethorphan Oral Liquid 10 milliLiter(s) Oral every 4 hours PRN  sodium chloride 0.65% Nasal 1 Spray(s) Both Nostrils every 4 hours PRN      REVIEW OF SYSTEMS  --------------------------------------------------------------------------------  Gen: No weight changes, fatigue, fevers/chills, weakness  Skin: No rashes  Head/Eyes/Ears/Mouth: No headache; Normal hearing; Normal vision w/o blurriness; No sinus pain/discomfort, sore throat  Respiratory: (+)SOB, No cough, wheezing, hemoptysis  CV: No chest pain, PND, orthopnea  GI: No abdominal pain, diarrhea, constipation, nausea, vomiting, melena, hematochezia  : No increased frequency, dysuria, hematuria, nocturia  MSK: No joint pain/swelling; no back pain; no edema  Neuro: No dizziness/lightheadedness, weakness, seizures, numbness, tingling  Heme: No easy bruising or bleeding  Endo: No heat/cold intolerance  Psych: No significant nervousness, anxiety, stress, depression    All other systems were reviewed and are negative, except as noted.    VITALS/PHYSICAL EXAM  --------------------------------------------------------------------------------  T(C): 36.5 (01-13-22 @ 05:27), Max: 36.9 (01-12-22 @ 13:01)  HR: 82 (01-13-22 @ 07:29) (65 - 86)  BP: 107/77 (01-13-22 @ 05:27) (100/59 - 114/74)  RR: 18 (01-13-22 @ 07:29) (18 - 20)  SpO2: 96% (01-13-22 @ 07:29) (96% - 100%)  Wt(kg): --        01-12-22 @ 07:01  -  01-13-22 @ 07:00  --------------------------------------------------------  IN: 0 mL / OUT: 0 mL / NET: 0 mL      Physical Exam:  	Gen: Increased work of breathing, labored  	HEENT: PERRL, supple neck, clear oropharynx  	Pulm: CTA B/L  	CV: RRR, S1S2; no rub  	Back: No spinal or CVA tenderness; no sacral edema  	Abd: +BS, soft, nontender/nondistended  	: No suprapubic tenderness  	UE: Warm, FROM, no clubbing, intact strength; no edema; no asterixis  	LE: Warm, FROM, no clubbing, intact strength; no edema  	Neuro: No focal deficits, intact gait  	Psych: Normal affect and mood  	Skin: Warm, without rashes  	Vascular access:    LABS/STUDIES  --------------------------------------------------------------------------------              7.6    56.72 >-----------<  308      [01-12-22 @ 15:17]              20.7     114  |  74  |  63  ----------------------------<  198      [01-13-22 @ 00:41]  4.3   |  23  |  1.48        Ca     8.1     [01-13-22 @ 00:41]      Mg     2.10     [01-13-22 @ 00:41]      Phos  4.4     [01-13-22 @ 00:41]          Serum Osmolality 260      [01-11-22 @ 19:04]    Creatinine Trend:  SCr 1.48 [01-13 @ 00:41]  SCr 1.16 [01-12 @ 15:17]  SCr 0.65 [01-11 @ 20:48]  SCr 0.71 [01-11 @ 19:04]  SCr 0.62 [01-11 @ 07:32]    Urinalysis - [01-04-22 @ 03:59]      Color Yellow / Appearance Clear / SG 1.026 / pH 6.0      Gluc Negative / Ketone Small  / Bili Negative / Urobili <2 mg/dL       Blood Negative / Protein Trace / Leuk Est Negative / Nitrite Negative      RBC 11 / WBC 1 / Hyaline 0 / Gran  / Sq Epi  / Non Sq Epi 1 / Bacteria Negative    Urine Sodium <20      [01-11-22 @ 19:23]  Urine Osmolality 490      [01-11-22 @ 19:23]    Ferritin 845      [01-11-22 @ 07:32]  TSH 2.84      [12-24-21 @ 07:27]  Lipid: chol 178, , HDL 39, LDL --      [12-24-21 @ 07:27]    HCV 0.08, Nonreact      [12-24-21 @ 09:53]     [facility] DIVISION OF KIDNEY DISEASES AND HYPERTENSION -- FOLLOW UP NOTE  --------------------------------------------------------------------------------  Chief Complaint: SOB, acute hypoxic resp failure    24 hour events/subjective: No overnight events. Na continues to slowly improve with fluids. Patient seen at bedside this morning. She appears fatigued with increase work of breathing. She states that she is a little short of breath but satting well on HFNC.         PAST HISTORY  --------------------------------------------------------------------------------  No significant changes to PMH, PSH, FHx, SHx, unless otherwise noted    ALLERGIES & MEDICATIONS  --------------------------------------------------------------------------------  Allergies    Bactrim (Unknown)  Betadine (Unknown)  latex (Unknown)  sulfa drugs (Unknown)    Intolerances      Standing Inpatient Medications  aspirin enteric coated 81 milliGRAM(s) Oral daily  cefepime   IVPB 2000 milliGRAM(s) IV Intermittent every 12 hours  chlorhexidine 2% Cloths 1 Application(s) Topical daily  enoxaparin Injectable 80 milliGRAM(s) SubCutaneous every 12 hours  gabapentin 200 milliGRAM(s) Oral two times a day  methylPREDNISolone sodium succinate Injectable 40 milliGRAM(s) IV Push every 12 hours  midodrine. 5 milliGRAM(s) Oral three times a day  senna 2 Tablet(s) Oral at bedtime  sodium chloride 3%. 500 milliLiter(s) IV Continuous <Continuous>    PRN Inpatient Medications  acetaminophen     Tablet .. 650 milliGRAM(s) Oral every 4 hours PRN  ALBUTerol    90 MICROgram(s) HFA Inhaler 2 Puff(s) Inhalation every 4 hours PRN  benzonatate 200 milliGRAM(s) Oral three times a day PRN  guaifenesin/dextromethorphan Oral Liquid 10 milliLiter(s) Oral every 4 hours PRN  sodium chloride 0.65% Nasal 1 Spray(s) Both Nostrils every 4 hours PRN      REVIEW OF SYSTEMS  --------------------------------------------------------------------------------  Gen: No weight changes, fatigue, fevers/chills, weakness  Skin: No rashes  Head/Eyes/Ears/Mouth: No headache; Normal hearing; Normal vision w/o blurriness; No sinus pain/discomfort, sore throat  Respiratory: (+)SOB, No cough, wheezing, hemoptysis  CV: No chest pain, PND, orthopnea  GI: No abdominal pain, diarrhea, constipation, nausea, vomiting, melena, hematochezia  : No increased frequency, dysuria, hematuria, nocturia  MSK: No joint pain/swelling; no back pain; no edema  Neuro: No dizziness/lightheadedness, weakness, seizures, numbness, tingling  Heme: No easy bruising or bleeding  Endo: No heat/cold intolerance  Psych: No significant nervousness, anxiety, stress, depression    All other systems were reviewed and are negative, except as noted.    VITALS/PHYSICAL EXAM  --------------------------------------------------------------------------------  T(C): 36.5 (01-13-22 @ 05:27), Max: 36.9 (01-12-22 @ 13:01)  HR: 82 (01-13-22 @ 07:29) (65 - 86)  BP: 107/77 (01-13-22 @ 05:27) (100/59 - 114/74)  RR: 18 (01-13-22 @ 07:29) (18 - 20)  SpO2: 96% (01-13-22 @ 07:29) (96% - 100%)  Wt(kg): --        01-12-22 @ 07:01  -  01-13-22 @ 07:00  --------------------------------------------------------  IN: 0 mL / OUT: 0 mL / NET: 0 mL      Physical Exam:  	Gen: Increased work of breathing, labored  	HEENT: PERRL, supple neck, clear oropharynx  	Pulm: CTA B/L  	CV: RRR, S1S2; no rub  	Back: No spinal or CVA tenderness; no sacral edema  	Abd: +BS, soft, nontender/nondistended  	: No suprapubic tenderness  	UE: Warm, FROM, no clubbing, intact strength; no edema; no asterixis  	LE: Warm, FROM, no clubbing, intact strength; no edema  	Neuro: No focal deficits, intact gait  	Psych: Normal affect and mood  	Skin: Warm, without rashes  	Vascular access:    LABS/STUDIES  --------------------------------------------------------------------------------              7.6    56.72 >-----------<  308      [01-12-22 @ 15:17]              20.7     114  |  74  |  63  ----------------------------<  198      [01-13-22 @ 00:41]  4.3   |  23  |  1.48        Ca     8.1     [01-13-22 @ 00:41]      Mg     2.10     [01-13-22 @ 00:41]      Phos  4.4     [01-13-22 @ 00:41]          Serum Osmolality 260      [01-11-22 @ 19:04]    Creatinine Trend:  SCr 1.48 [01-13 @ 00:41]  SCr 1.16 [01-12 @ 15:17]  SCr 0.65 [01-11 @ 20:48]  SCr 0.71 [01-11 @ 19:04]  SCr 0.62 [01-11 @ 07:32]    Urinalysis - [01-04-22 @ 03:59]      Color Yellow / Appearance Clear / SG 1.026 / pH 6.0      Gluc Negative / Ketone Small  / Bili Negative / Urobili <2 mg/dL       Blood Negative / Protein Trace / Leuk Est Negative / Nitrite Negative      RBC 11 / WBC 1 / Hyaline 0 / Gran  / Sq Epi  / Non Sq Epi 1 / Bacteria Negative    Urine Sodium <20      [01-11-22 @ 19:23]  Urine Osmolality 490      [01-11-22 @ 19:23]    Ferritin 845      [01-11-22 @ 07:32]  TSH 2.84      [12-24-21 @ 07:27]  Lipid: chol 178, , HDL 39, LDL --      [12-24-21 @ 07:27]    HCV 0.08, Nonreact      [12-24-21 @ 09:53]

## 2022-01-13 NOTE — CHART NOTE - NSCHARTNOTESELECT_GEN_ALL_CORE
Event Note
Nephrology/Event Note

## 2022-01-13 NOTE — CHART NOTE - NSCHARTNOTEFT_GEN_A_CORE
[facility] MEDICINE NIGHT COVERAGE    BMP w/ Na+ 114 (corrected to 116 w/ glucose of 198). On-call nephrology consulted, awaiting further recommendations.    Ivonne Dumas PA-C  Medicine [id number] [facility] NIGHT COVERAGE    BMP w/ Na+ 114 (corrected to 116 w/ glucose of 198). Discussed w/ on-call nephrology, Dr. Edwards, Na+ corrected 7mEq in 24hrs; recommending repeating BMP in AM and collecting serum & urine osmolality w/ AM labs.    Ivonne Dumas PA-C  Medicine [id number]

## 2022-01-13 NOTE — RAPID RESPONSE TEAM SUMMARY - NSSITUATIONBACKGROUNDRRT_GEN_ALL_CORE
76F PMHx HTN, Asthma as a child (no hx of intubations/hospitalizations, Venous insufficiency, Lymphedema  Osteoarthritis presenting with fatigue, productive cough, and dyspnea. Patient report she was exposed to sick contact -  + COVID x 20 days ago. RRT called for unresponsiveness. Patient last seen in her normal state of being this morning around breakfast time per roommate. Thereafter, nurse states that she checked on patient and she was unresponsive. At the time, patient was assessed and had a pulse but it was lost; RRT upgraded to CODE BLUE. Patient found in bed, actively being compressed. ACP at bedside,  76F PMHx HTN, Asthma as a child (no hx of intubations/hospitalizations, Venous insufficiency, Lymphedema  Osteoarthritis presenting with fatigue, productive cough, and dyspnea. Patient report she was exposed to sick contact -  + COVID x 20 days ago. RRT called for unresponsiveness. Patient last seen in her normal state of being this morning around breakfast time per roommate. Thereafter, nurse states that she checked on patient and she was unresponsive. At the time, patient was assessed and had a pulse but it was lost; RRT upgraded to CODE BLUE. Patient found in bed, actively being compressed. ACP at bedside, had discussion with family who stated that patient was FULL CODE. Patient underwent 4 rounds of CPR with 4 epinephrine 1 mg pushes and 1 amp of bicarb; ROSC was achieved with very weak pulses in carotids and femorals. However, unable to assess blood pressure, neither in the upper or lower extremities with heart monitor and conventional blood pressure machine. Manual pressure attempted but unable to ascertain. Norepinephrine was escalated to 1 mcg but still unable to acquire blood pressure. MICU at bedside, bedside echo significant for significantly decreased ejection fraction. Family was informed that despite maximal resuscitative efforts that patient is likely . Family to come in to see patient.

## 2022-01-13 NOTE — PROVIDER CONTACT NOTE (CRITICAL VALUE NOTIFICATION) - SITUATION
Critical value sodium 114
MRSA test cancelled by lab
Critical lab value- Sodium 114
Critical lab- Sodium 107

## 2022-06-05 NOTE — PROGRESS NOTE ADULT - PROVIDER SPECIALTY LIST ADULT
Critical Care
Nephrology
Critical Care
Nephrology
Critical Care
Critical Care
Wound Care
73.9
Critical Care
